# Patient Record
Sex: MALE | Race: WHITE | Employment: FULL TIME | URBAN - METROPOLITAN AREA
[De-identification: names, ages, dates, MRNs, and addresses within clinical notes are randomized per-mention and may not be internally consistent; named-entity substitution may affect disease eponyms.]

---

## 2017-10-13 LAB — COLONOSCOPY, EXTERNAL: NORMAL

## 2019-04-09 ENCOUNTER — OFFICE VISIT (OUTPATIENT)
Dept: FAMILY MEDICINE CLINIC | Age: 64
End: 2019-04-09

## 2019-04-09 VITALS
RESPIRATION RATE: 18 BRPM | OXYGEN SATURATION: 100 % | HEIGHT: 66 IN | TEMPERATURE: 98.4 F | DIASTOLIC BLOOD PRESSURE: 80 MMHG | WEIGHT: 203.2 LBS | SYSTOLIC BLOOD PRESSURE: 136 MMHG | HEART RATE: 77 BPM | BODY MASS INDEX: 32.66 KG/M2

## 2019-04-09 DIAGNOSIS — R73.03 PREDIABETES: Primary | ICD-10-CM

## 2019-04-09 DIAGNOSIS — I10 ESSENTIAL HYPERTENSION: ICD-10-CM

## 2019-04-09 PROBLEM — G25.81 RESTLESS LEGS SYNDROME (RLS): Status: ACTIVE | Noted: 2018-02-23

## 2019-04-09 PROBLEM — G47.33 SEVERE OBSTRUCTIVE SLEEP APNEA: Status: ACTIVE | Noted: 2018-02-23

## 2019-04-09 RX ORDER — METFORMIN HYDROCHLORIDE 500 MG/1
TABLET, EXTENDED RELEASE ORAL
Refills: 3 | COMMUNITY
Start: 2019-03-04 | End: 2019-04-09 | Stop reason: ALTCHOICE

## 2019-04-09 RX ORDER — METFORMIN HYDROCHLORIDE 500 MG/1
500 TABLET, EXTENDED RELEASE ORAL
Qty: 90 TAB | Refills: 3 | Status: SHIPPED | COMMUNITY
Start: 2019-04-09 | End: 2019-08-12 | Stop reason: SDUPTHER

## 2019-04-09 RX ORDER — LISINOPRIL 10 MG/1
10 TABLET ORAL DAILY
Qty: 90 TAB | Refills: 3 | Status: SHIPPED | COMMUNITY
Start: 2019-04-09 | End: 2019-05-07 | Stop reason: SDUPTHER

## 2019-04-09 RX ORDER — LISINOPRIL 10 MG/1
TABLET ORAL
COMMUNITY
End: 2019-04-09

## 2019-04-09 RX ORDER — METFORMIN HYDROCHLORIDE 500 MG/1
500 TABLET, EXTENDED RELEASE ORAL
Qty: 30 TAB | Refills: 3 | Status: SHIPPED | OUTPATIENT
Start: 2019-04-09 | End: 2019-04-09 | Stop reason: SDUPTHER

## 2019-04-09 RX ORDER — METFORMIN HYDROCHLORIDE 500 MG/1
500 TABLET, EXTENDED RELEASE ORAL
Qty: 90 TAB | Refills: 3 | Status: SHIPPED | COMMUNITY
Start: 2019-04-09 | End: 2019-04-09 | Stop reason: SDUPTHER

## 2019-04-09 NOTE — PATIENT INSTRUCTIONS
DASH Diet: Care Instructions  Your Care Instructions    The DASH diet is an eating plan that can help lower your blood pressure. DASH stands for Dietary Approaches to Stop Hypertension. Hypertension is high blood pressure. The DASH diet focuses on eating foods that are high in calcium, potassium, and magnesium. These nutrients can lower blood pressure. The foods that are highest in these nutrients are fruits, vegetables, low-fat dairy products, nuts, seeds, and legumes. But taking calcium, potassium, and magnesium supplements instead of eating foods that are high in those nutrients does not have the same effect. The DASH diet also includes whole grains, fish, and poultry. The DASH diet is one of several lifestyle changes your doctor may recommend to lower your high blood pressure. Your doctor may also want you to decrease the amount of sodium in your diet. Lowering sodium while following the DASH diet can lower blood pressure even further than just the DASH diet alone. Follow-up care is a key part of your treatment and safety. Be sure to make and go to all appointments, and call your doctor if you are having problems. It's also a good idea to know your test results and keep a list of the medicines you take. How can you care for yourself at home? Following the DASH diet  · Eat 4 to 5 servings of fruit each day. A serving is 1 medium-sized piece of fruit, ½ cup chopped or canned fruit, 1/4 cup dried fruit, or 4 ounces (½ cup) of fruit juice. Choose fruit more often than fruit juice. · Eat 4 to 5 servings of vegetables each day. A serving is 1 cup of lettuce or raw leafy vegetables, ½ cup of chopped or cooked vegetables, or 4 ounces (½ cup) of vegetable juice. Choose vegetables more often than vegetable juice. · Get 2 to 3 servings of low-fat and fat-free dairy each day. A serving is 8 ounces of milk, 1 cup of yogurt, or 1 ½ ounces of cheese. · Eat 6 to 8 servings of grains each day.  A serving is 1 slice of bread, 1 ounce of dry cereal, or ½ cup of cooked rice, pasta, or cooked cereal. Try to choose whole-grain products as much as possible. · Limit lean meat, poultry, and fish to 2 servings each day. A serving is 3 ounces, about the size of a deck of cards. · Eat 4 to 5 servings of nuts, seeds, and legumes (cooked dried beans, lentils, and split peas) each week. A serving is 1/3 cup of nuts, 2 tablespoons of seeds, or ½ cup of cooked beans or peas. · Limit fats and oils to 2 to 3 servings each day. A serving is 1 teaspoon of vegetable oil or 2 tablespoons of salad dressing. · Limit sweets and added sugars to 5 servings or less a week. A serving is 1 tablespoon jelly or jam, ½ cup sorbet, or 1 cup of lemonade. · Eat less than 2,300 milligrams (mg) of sodium a day. If you limit your sodium to 1,500 mg a day, you can lower your blood pressure even more. Tips for success  · Start small. Do not try to make dramatic changes to your diet all at once. You might feel that you are missing out on your favorite foods and then be more likely to not follow the plan. Make small changes, and stick with them. Once those changes become habit, add a few more changes. · Try some of the following:  ? Make it a goal to eat a fruit or vegetable at every meal and at snacks. This will make it easy to get the recommended amount of fruits and vegetables each day. ? Try yogurt topped with fruit and nuts for a snack or healthy dessert. ? Add lettuce, tomato, cucumber, and onion to sandwiches. ? Combine a ready-made pizza crust with low-fat mozzarella cheese and lots of vegetable toppings. Try using tomatoes, squash, spinach, broccoli, carrots, cauliflower, and onions. ? Have a variety of cut-up vegetables with a low-fat dip as an appetizer instead of chips and dip. ? Sprinkle sunflower seeds or chopped almonds over salads. Or try adding chopped walnuts or almonds to cooked vegetables.   ? Try some vegetarian meals using beans and peas. Add garbanzo or kidney beans to salads. Make burritos and tacos with mashed nassar beans or black beans. Where can you learn more? Go to http://gloria-candida.info/. Enter K075 in the search box to learn more about \"DASH Diet: Care Instructions. \"  Current as of: July 22, 2018  Content Version: 11.9  © 5062-2770 [employer], [employer]. Care instructions adapted under license by [employer] (which disclaims liability or warranty for this information). If you have questions about a medical condition or this instruction, always ask your healthcare professional. [address] any warranty or liability for your use of this information.

## 2019-04-09 NOTE — PROGRESS NOTES
HISTORY OF PRESENT ILLNESS  Lore Yang is a 58 y.o. male. HPI  Patient is here today for evaluation and treatment of: Diabetes/Hypertension/      Prediabetes: Pt has had  prediabetes X3 years. ;  Last A1c was controlled. He attempts a lower sugar diet and stays active. He is on metformin and tolerates med well. He needs refills on his meds. He would like to use his mail order pharmacy but needs a short term rx sent to a local pharmacy. Hypertension:    Pt states that his BP has been mostly controlled. Pt is on lisinopril. Pt takes med daily    He has no new complaints. Current Outpatient Medications:     vit B complex 100 no.2/herbs (VITAMIN B COMPLEX 100  2-HERBS PO), vitamin B complex  one daily, Disp: , Rfl:     lisinopril (PRINIVIL, ZESTRIL) 10 mg tablet, lisinopril 10 mg tablet  TAKE 1 TABLET BY MOUTH EVERY DAY, Disp: , Rfl:     metFORMIN ER (GLUCOPHAGE XR) 500 mg tablet, TK 1 T PO QD, Disp: , Rfl: 3    PMH,  Meds, Allergies, Family History, Social history reviewed    Review of Systems   Constitutional: Negative for chills and fever. Respiratory: Negative for shortness of breath and wheezing. Cardiovascular: Negative for chest pain and palpitations. Physical Exam   Visit Vitals  /80   Pulse 77   Temp 98.4 °F (36.9 °C) (Oral)   Resp 18   Ht 5' 6\" (1.676 m)   Wt 203 lb 3.2 oz (92.2 kg)   SpO2 100%   BMI 32.80 kg/m²         ASSESSMENT and PLAN    ICD-10-CM ICD-9-CM    1. Prediabetes R73.03 790.29    2.  Essential hypertension I10 401.9        As above,   above all stable unless otherwise noted   treatment plan as listed below  Orders Placed This Encounter    DISCONTD: canagliflozin-metformin 150-500 mg TBph    vit B complex 100 no.2/herbs (VITAMIN B COMPLEX 100  2-HERBS PO)    DISCONTD: lisinopril (PRINIVIL, ZESTRIL) 10 mg tablet    DISCONTD: metFORMIN ER (GLUCOPHAGE XR) 500 mg tablet    lisinopril (PRINIVIL, ZESTRIL) 10 mg tablet    DISCONTD: metFORMIN ER (GLUCOPHAGE XR) 500 mg tablet    DISCONTD: metFORMIN ER (GLUCOPHAGE XR) 500 mg tablet    metFORMIN ER (GLUCOPHAGE XR) 500 mg tablet     meds reconciled  Refilled meds needed  Follow-up and Dispositions    · Return in about 4 months (around 8/9/2019) for prediabetes/htn. An After Visit Summary was printed and given to the patient. This has been fully explained to the patient, who indicates understanding.

## 2019-04-09 NOTE — PROGRESS NOTES
Patient here for DM, HTN        1. Have you been to the ER, urgent care clinic since your last visit? Hospitalized since your last visit? No    2. Have you seen or consulted any other health care providers outside of the [address] [place] [address] since your last visit? Include any pap smears or colon screening.  No

## 2019-05-07 ENCOUNTER — TELEPHONE (OUTPATIENT)
Dept: FAMILY MEDICINE CLINIC | Age: 64
End: 2019-05-07

## 2019-05-07 RX ORDER — LISINOPRIL 10 MG/1
10 TABLET ORAL DAILY
Qty: 30 TAB | Refills: 3 | Status: SHIPPED | OUTPATIENT
Start: 2019-05-07 | End: 2019-06-04 | Stop reason: SDUPTHER

## 2019-05-07 NOTE — TELEPHONE ENCOUNTER
Patient called and said that the pharmacy filled his lisinopril as the branded medication instead of generic. His insurance denied the meds and he needs a new script sent in or a call back regarding meds.

## 2019-05-07 NOTE — TELEPHONE ENCOUNTER
Spoke with Cayla at [facility] stated that the system pulled Prinivil to be processed. Cayla manually entered lisinopril accordingly to the prescription order. Cayla stated that refill can still not be processed due to the patient has no benefits with [facility] and patient needs to contact the insurance company. Will inform patient. Cayla verbalized understanding.

## 2019-05-07 NOTE — TELEPHONE ENCOUNTER
Spoke with the patient to inform that Cayla at [facility] stated that the system pulled Prinivil to be processed. Informed that Cayla manually entered lisinopril accordingly to the prescription order. Informed that Cayla stated that refill can still not be processed due to the patient has no benefits with [facility] and inform patient to contact the insurance company. Patient verbalized understanding and requested a refill for lisinopril be sent to the [facility] [address].

## 2019-06-06 RX ORDER — LISINOPRIL 10 MG/1
TABLET ORAL
Qty: 90 TAB | Refills: 3 | Status: SHIPPED | OUTPATIENT
Start: 2019-06-06 | End: 2019-08-12 | Stop reason: SDUPTHER

## 2019-06-06 RX ORDER — METFORMIN HYDROCHLORIDE 500 MG/1
TABLET, EXTENDED RELEASE ORAL
Qty: 90 TAB | Refills: 3 | Status: SHIPPED | OUTPATIENT
Start: 2019-06-06 | End: 2019-12-09 | Stop reason: SDUPTHER

## 2019-08-08 ENCOUNTER — OFFICE VISIT (OUTPATIENT)
Dept: FAMILY MEDICINE CLINIC | Age: 64
End: 2019-08-08

## 2019-08-08 VITALS
HEIGHT: 66 IN | WEIGHT: 208.8 LBS | OXYGEN SATURATION: 96 % | HEART RATE: 84 BPM | BODY MASS INDEX: 33.56 KG/M2 | TEMPERATURE: 98.2 F | RESPIRATION RATE: 18 BRPM | DIASTOLIC BLOOD PRESSURE: 73 MMHG | SYSTOLIC BLOOD PRESSURE: 131 MMHG

## 2019-08-08 DIAGNOSIS — R73.09 ELEVATED HEMOGLOBIN A1C: Primary | ICD-10-CM

## 2019-08-08 DIAGNOSIS — I10 ESSENTIAL HYPERTENSION: ICD-10-CM

## 2019-08-08 DIAGNOSIS — K21.9 GASTROESOPHAGEAL REFLUX DISEASE WITHOUT ESOPHAGITIS: ICD-10-CM

## 2019-08-08 RX ORDER — OMEPRAZOLE 20 MG/1
TABLET, DELAYED RELEASE ORAL
COMMUNITY
End: 2019-08-08 | Stop reason: SDUPTHER

## 2019-08-08 RX ORDER — PHENOL/SODIUM PHENOLATE
20 AEROSOL, SPRAY (ML) MUCOUS MEMBRANE DAILY
Qty: 30 TAB | Refills: 6 | Status: SHIPPED | OUTPATIENT
Start: 2019-08-08

## 2019-08-08 RX ORDER — PHENOL/SODIUM PHENOLATE
20 AEROSOL, SPRAY (ML) MUCOUS MEMBRANE DAILY
COMMUNITY
End: 2019-08-08 | Stop reason: SDUPTHER

## 2019-08-08 RX ORDER — MINERAL OIL
180 ENEMA (ML) RECTAL DAILY
COMMUNITY

## 2019-08-08 NOTE — PROGRESS NOTES
HISTORY OF PRESENT ILLNESS  James Mosley is a 71 y.o. male. HPI  Patient is here today for evaluation and treatment of: Pre- Diabetes/Hypertension/    Pre-Diabetes:    Hew is on metformin for prediabetes ; He is in need of bloodwork. He will be using [facility] soon. Hypertension:  BP is stable; He continues on lisinopril      GERD- takes OTC prilosec which helps his GERD. He requests a refill to [facility] for this. Pt takes boarding pass for prostate health. This helps his urinary flow. Current Outpatient Medications:     omeprazole (PRILOSEC OTC) 20 mg tablet, omeprazole 20 mg tablet,delayed release  Take 1 tablet every day by oral route., Disp: , Rfl:     Fe gluconate/vit C/folic acid (IRON-C PO), Take  by mouth., Disp: , Rfl:     SAW PALMETTO PO, Take 450 mg by mouth., Disp: , Rfl:     fexofenadine (ALLEGRA) 180 mg tablet, Take  by mouth., Disp: , Rfl:     metFORMIN ER (GLUCOPHAGE XR) 500 mg tablet, TAKE 1 TABLET BY MOUTH DAILY WITH DINNER, Disp: 90 Tab, Rfl: 3    lisinopril (PRINIVIL, ZESTRIL) 10 mg tablet, TAKE 1 TABLET BY MOUTH DAILY, Disp: 90 Tab, Rfl: 3    metFORMIN ER (GLUCOPHAGE XR) 500 mg tablet, Take 1 Tab by mouth daily (with dinner). , Disp: 90 Tab, Rfl: 3    vit B complex 100 no.2/herbs (VITAMIN B COMPLEX 100  2-HERBS PO), vitamin B complex  one daily, Disp: , Rfl:      PMH,  Meds, Allergies, Family History, Social history reviewed    Review of Systems   Constitutional: Negative for chills and fever. Respiratory: Negative for shortness of breath and wheezing. Cardiovascular: Negative for chest pain and palpitations. Gastrointestinal: Positive for heartburn (if he does not take prilosec).        Physical Exam   Visit Vitals  /73 (BP 1 Location: Left arm, BP Patient Position: Sitting)   Pulse 84   Temp 98.2 °F (36.8 °C) (Oral)   Resp 18   Ht 5' 6\" (1.676 m)   Wt 208 lb 12.8 oz (94.7 kg)   SpO2 96%   BMI 33.70 kg/m²     General appearance: alert, cooperative, no distress, appears stated age  Neck: supple, symmetrical, trachea midline, no adenopathy, thyroid: not enlarged, symmetric, no tenderness/mass/nodules, no carotid bruit and no JVD  Lungs: clear to auscultation bilaterally  Heart: regular rate and rhythm, S1, S2 normal, no murmur, click, rub or gallop  Extremities: extremities normal, atraumatic, no cyanosis or edema      ASSESSMENT and PLAN    ICD-10-CM ICD-9-CM    1. Elevated hemoglobin A1c R73.09 790.29 HEMOGLOBIN A1C WITH EAG      AST      ALT   2. Essential hypertension I20 705.6 METABOLIC PANEL, BASIC      LIPID PANEL   3. Gastroesophageal reflux disease without esophagitis K21.9 530.81        As above,   above all stable unless otherwise noted   treatment plan as listed below  Orders Placed This Encounter    METABOLIC PANEL, BASIC    HEMOGLOBIN A1C WITH EAG    LIPID PANEL    AST    ALT    DISCONTD: omeprazole (PRILOSEC OTC) 20 mg tablet    Fe gluconate/vit C/folic acid (IRON-C PO)    SAW PALMETTO PO    fexofenadine (ALLEGRA) 180 mg tablet    DISCONTD: Omeprazole delayed release (PRILOSEC D/R) 20 mg tablet    saw palmetto xtr/zinc picolin (SAW PALMETTO EXTRACT PO)    Omeprazole delayed release (PRILOSEC D/R) 20 mg tablet     Ordered prilosec as per record  Continue other current meds  Will attempt to refill Pts med to [id number] [facility]  Follow-up and Dispositions    · Return in about 4 months (around 12/8/2019) for medicare well. An After Visit Summary was printed and given to the patient. This has been fully explained to the patient, who indicates understanding.

## 2019-08-08 NOTE — PROGRESS NOTES
Patient is here for a hypertension/pre diabetes Follow up. 1. Have you been to the ER, urgent care clinic since your last visit? Hospitalized since your last visit? No    2. Have you seen or consulted any other health care providers outside of the [address] [place] [address] since your last visit? Include any pap smears or colon screening.  No

## 2019-08-09 LAB
ALT SERPL-CCNC: 19 IU/L (ref 0–44)
AST SERPL-CCNC: 23 IU/L (ref 0–40)
BUN SERPL-MCNC: 19 MG/DL (ref 8–27)
BUN/CREAT SERPL: 16 (ref 10–24)
CALCIUM SERPL-MCNC: 9.6 MG/DL (ref 8.6–10.2)
CHLORIDE SERPL-SCNC: 102 MMOL/L (ref 96–106)
CHOLEST SERPL-MCNC: 218 MG/DL (ref 100–199)
CO2 SERPL-SCNC: 22 MMOL/L (ref 20–29)
CREAT SERPL-MCNC: 1.2 MG/DL (ref 0.76–1.27)
EST. AVERAGE GLUCOSE BLD GHB EST-MCNC: 128 MG/DL
GLUCOSE SERPL-MCNC: 112 MG/DL (ref 65–99)
HBA1C MFR BLD: 6.1 % (ref 4.8–5.6)
HDLC SERPL-MCNC: 61 MG/DL
LDLC SERPL CALC-MCNC: 139 MG/DL (ref 0–99)
POTASSIUM SERPL-SCNC: 5.7 MMOL/L (ref 3.5–5.2)
SODIUM SERPL-SCNC: 138 MMOL/L (ref 134–144)
TRIGL SERPL-MCNC: 90 MG/DL (ref 0–149)
VLDLC SERPL CALC-MCNC: 18 MG/DL (ref 5–40)

## 2019-08-12 RX ORDER — METFORMIN HYDROCHLORIDE 500 MG/1
500 TABLET, EXTENDED RELEASE ORAL
Qty: 90 TAB | Refills: 3 | Status: SHIPPED | OUTPATIENT
Start: 2019-08-12 | End: 2019-12-09 | Stop reason: SDUPTHER

## 2019-08-12 RX ORDER — LISINOPRIL 10 MG/1
TABLET ORAL
Qty: 90 TAB | Refills: 3 | Status: SHIPPED | COMMUNITY
Start: 2019-08-12 | End: 2019-12-09 | Stop reason: SDUPTHER

## 2019-12-09 ENCOUNTER — OFFICE VISIT (OUTPATIENT)
Dept: FAMILY MEDICINE CLINIC | Age: 64
End: 2019-12-09

## 2019-12-09 VITALS
TEMPERATURE: 97.9 F | SYSTOLIC BLOOD PRESSURE: 136 MMHG | DIASTOLIC BLOOD PRESSURE: 69 MMHG | BODY MASS INDEX: 33.91 KG/M2 | RESPIRATION RATE: 16 BRPM | WEIGHT: 211 LBS | HEIGHT: 66 IN | HEART RATE: 81 BPM | OXYGEN SATURATION: 100 %

## 2019-12-09 DIAGNOSIS — R73.09 ELEVATED HEMOGLOBIN A1C: ICD-10-CM

## 2019-12-09 DIAGNOSIS — Z00.00 WELCOME TO MEDICARE PREVENTIVE VISIT: Primary | ICD-10-CM

## 2019-12-09 DIAGNOSIS — Z11.59 ENCOUNTER FOR HEPATITIS C VIRUS SCREENING TEST FOR HIGH RISK PATIENT: ICD-10-CM

## 2019-12-09 DIAGNOSIS — I10 ESSENTIAL HYPERTENSION: ICD-10-CM

## 2019-12-09 DIAGNOSIS — Z23 ENCOUNTER FOR IMMUNIZATION: ICD-10-CM

## 2019-12-09 DIAGNOSIS — Z91.89 ENCOUNTER FOR HEPATITIS C VIRUS SCREENING TEST FOR HIGH RISK PATIENT: ICD-10-CM

## 2019-12-09 RX ORDER — LISINOPRIL 10 MG/1
TABLET ORAL
Qty: 90 TAB | Refills: 3 | Status: SHIPPED | OUTPATIENT
Start: 2019-12-09 | End: 2020-10-14

## 2019-12-09 RX ORDER — METFORMIN HYDROCHLORIDE 500 MG/1
500 TABLET, EXTENDED RELEASE ORAL
Qty: 90 TAB | Refills: 3 | Status: SHIPPED | OUTPATIENT
Start: 2019-12-09 | End: 2020-06-20 | Stop reason: ALTCHOICE

## 2019-12-09 NOTE — PATIENT INSTRUCTIONS
Medicare Wellness Visit, Male The best way to live healthy is to have a lifestyle where you eat a well-balanced diet, exercise regularly, limit alcohol use, and quit all forms of tobacco/nicotine, if applicable. Regular preventive services are another way to keep healthy. Preventive services (vaccines, screening tests, monitoring & exams) can help personalize your care plan, which helps you manage your own care. Screening tests can find health problems at the earliest stages, when they are easiest to treat. [facility] follows the current, evidence-based guidelines published by the [facility] ([facility]) when recommending preventive services for our patients. Because we follow these guidelines, sometimes recommendations change over time as research supports it. (For example, a prostate screening blood test is no longer routinely recommended for men with no symptoms). Of course, you and your doctor may decide to screen more often for some diseases, based on your risk and co-morbidities (chronic disease you are already diagnosed with). Preventive services for you include: - Medicare offers their members a free annual wellness visit, which is time for you and your primary care provider to discuss and plan for your preventive service needs. Take advantage of this benefit every year! 
-All adults over age 72 should receive the recommended pneumonia vaccines. Current USPSTF guidelines recommend a series of two vaccines for the best pneumonia protection.  
-All adults should have a flu vaccine yearly and tetanus vaccine every 10 years. 
-All adults age 48 and older should receive the shingles vaccines (series of two vaccines).       
-All adults age 38-68 who are overweight should have a diabetes screening test once every three years.  
-Other screening tests & preventive services for persons with diabetes include: an eye exam to screen for diabetic retinopathy, a kidney function test, a foot exam, and stricter control over your cholesterol.  
-Cardiovascular screening for adults with routine risk involves an electrocardiogram (ECG) at intervals determined by the provider.  
-Colorectal cancer screening should be done for adults age 54-65 with no increased risk factors for colorectal cancer. There are a number of acceptable methods of screening for this type of cancer. Each test has its own benefits and drawbacks. Discuss with your provider what is most appropriate for you during your annual wellness visit. The different tests include: colonoscopy (considered the best screening method), a fecal occult blood test, a fecal DNA test, and sigmoidoscopy. 
-All adults born between [facility] should be screened once for Hepatitis C. 
-An Abdominal Aortic Aneurysm (AAA) Screening is recommended for men age 73-68 who has ever smoked in their lifetime. Here is a list of your current Health Maintenance items (your personalized list of preventive services) with a due date: 
Health Maintenance Due Topic Date Due  
 Hepatitis C Test  1955  Colonoscopy  04/23/1972  Pneumococcal Vaccine (1 of 1 - PPSV23) 04/23/2019  Flu Vaccine  08/01/2019

## 2019-12-09 NOTE — PROGRESS NOTES
This is a \"Welcome to [employer]"  Initial Preventive Physical Examination (IPPE) providing Personalized Prevention Plan Services (Performed in the first 12 months of enrollment)    I have reviewed the patient's medical history in detail and updated the computerized patient record. Doing well. Walking daily. History     Past Medical History:   Diagnosis Date    Allergic rhinitis     GERD (gastroesophageal reflux disease)     GERD (gastroesophageal reflux disease)     Hypertension     Insomnia 02/23/2018    External report    Nocturia     Prediabetes     Restless legs syndrome (RLS) 02/23/2018    External report    Severe obstructive sleep apnea 02/23/2018    External report      Past Surgical History:   Procedure Laterality Date    ABDOMEN SURGERY PROC UNLISTED      HX APPENDECTOMY      HX HERNIA REPAIR      HX TONSILLECTOMY       Current Outpatient Medications   Medication Sig Dispense Refill    lisinopril (PRINIVIL, ZESTRIL) 10 mg tablet TAKE 1 TABLET BY MOUTH DAILY 30 Tab 6    metFORMIN ER (GLUCOPHAGE XR) 500 mg tablet Take 1 Tab by mouth daily (with dinner). 90 Tab 3    Fe gluconate/vit C/folic acid (IRON-C PO) Take  by mouth.  SAW PALMETTO PO Take 450 mg by mouth.  fexofenadine (ALLEGRA) 180 mg tablet Take 180 mg by mouth daily.  Omeprazole delayed release (PRILOSEC D/R) 20 mg tablet Take 1 Tab by mouth daily. 30 Tab 6    vit B complex 100 no.2/herbs (VITAMIN B COMPLEX 100  2-HERBS PO) vitamin B complex   one daily       Allergies   Allergen Reactions    Nifedipine Other (comments)     Facial redness    Other Plant, Animal, Environmental Sneezing     Watery eyes, runny nose, sneezing.     Sulfa (Sulfonamide Antibiotics) Other (comments)     Unknown reaction       Family History   Problem Relation Age of Onset    Alzheimer Mother     COPD Father     Lung Disease Father      Social History     Tobacco Use    Smoking status: Former Smoker     Packs/day: 1.00 Years: 8.00     Pack years: 8.00     Last attempt to quit: 1980     Years since quittin.9    Smokeless tobacco: Never Used   Substance Use Topics    Alcohol use: Yes     Alcohol/week: 6.0 standard drinks     Types: 5 Glasses of wine, 1 Shots of liquor per week       Depression Risk Screen     3 most recent PHQ Screens 2019   Little interest or pleasure in doing things Not at all   Feeling down, depressed, irritable, or hopeless Not at all   Total Score PHQ 2 0       Alcohol Risk Factor Screening (MALE > 65): Do you average more 1 drink per night or more than 7 drinks a week: No    In the past three months have you have had more than 4 drinks containing alcohol on one occasion: No      Functional Ability and Level of Safety   Diet: No special diet    Hearing: Hearing is good. Vision Screening:  Vision is good. Visual Acuity Screening    Right eye Left eye Both eyes   Without correction: 20/20 20/20 20/20   With correction:            Activities of Daily Living: The home contains: no safety equipment. Patient does total self care    Ambulation: with no difficulty    Exercise level: moderately active    Fall Risk Screen:  Fall Risk Assessment, last 12 mths 2019   Able to walk? Yes   Fall in past 12 months?  No       Abuse Screen:  Patient is not abused    Screening EKG   EKG order placed: Yes    Patient Care Team   Patient Care Team:  Sandeep Martínez MD as PCP - General (Family Practice)  Sandeep Martínez MD as PCP - [facility] Empaneled Provider     PE:  Visit Vitals  /69 (BP 1 Location: Right arm, BP Patient Position: Sitting)   Pulse 81   Temp 97.9 °F (36.6 °C) (Oral)   Resp 16   Ht 5' 6\" (1.676 m)   Wt 211 lb (95.7 kg)   SpO2 100%   BMI 34.06 kg/m²     General appearance: alert, cooperative, no distress, appears stated age    Lungs: clear to auscultation bilaterally  Heart: regular rate and rhythm, S1, S2 normal, no murmur, click, rub or gallop    Extremities: extremities normal, atraumatic, no cyanosis or edema      End of Life Planning   Advanced care planning directives were discussed with the patient and /or family/caregiver. Assessment/Plan   Education and counseling provided:  Are appropriate based on today's review and evaluation  End-of-Life planning (with patient's consent)  Colorectal cancer screening tests  Cardiovascular screening blood test    Diagnoses and all orders for this visit:    1. Welcome to Medicare preventive visit  -     AMB POC EKG ROUTINE W/ 12 LEADS, SCREEN ()    2. Elevated hemoglobin A1c  -     HEMOGLOBIN A1C WITH EAG; Future  -     AST; Future  -     ALT; Future    3. Essential hypertension  -     LIPID PANEL; Future    4. Encounter for hepatitis C virus screening test for high risk patient  -     HEPATITIS C AB; Future    5. Encounter for immunization  -     PNEUMOCOCCAL POLYSACCHARIDE VACCINE, 23-VALENT, ADULT OR IMMUNOSUPPRESSED PT DOSE,    Other orders  -     lisinopril (PRINIVIL, ZESTRIL) 10 mg tablet; TAKE 1 TABLET BY MOUTH DAILY  -     metFORMIN ER (GLUCOPHAGE XR) 500 mg tablet; Take 1 Tab by mouth daily (with dinner). -     LIPID PANEL  -     HEMOGLOBIN A1C WITH EAG  -     HEPATITIS C AB  -     AST  -     ALT        Health Maintenance Due   Topic Date Due    COLONOSCOPY  04/23/1972    Influenza Age 5 to Adult  08/01/2019     As above  Pt stable  Follow-up and Dispositions    · Return in about 6 months (around 6/9/2020) for prediabetes. An After Visit Summary was printed and given to the patient. This has been fully explained to the patient, who indicates understanding.

## 2019-12-09 NOTE — PROGRESS NOTES
1. Have you been to the ER, urgent care clinic since your last visit? Hospitalized since your last visit? No    2. Have you seen or consulted any other health care providers outside of the [address] [place] [address] since your last visit? Include any pap smears or colon screening.  No

## 2019-12-10 LAB
ALT SERPL-CCNC: 18 IU/L (ref 0–44)
AST SERPL-CCNC: 20 IU/L (ref 0–40)
CHOLEST SERPL-MCNC: 209 MG/DL (ref 100–199)
EST. AVERAGE GLUCOSE BLD GHB EST-MCNC: 131 MG/DL
HBA1C MFR BLD: 6.2 % (ref 4.8–5.6)
HCV AB S/CO SERPL IA: <0.1 S/CO RATIO (ref 0–0.9)
HDLC SERPL-MCNC: 58 MG/DL
LDLC SERPL CALC-MCNC: 130 MG/DL (ref 0–99)
TRIGL SERPL-MCNC: 104 MG/DL (ref 0–149)
VLDLC SERPL CALC-MCNC: 21 MG/DL (ref 5–40)

## 2019-12-17 ENCOUNTER — TELEPHONE (OUTPATIENT)
Dept: FAMILY MEDICINE CLINIC | Age: 64
End: 2019-12-17

## 2020-06-15 ENCOUNTER — VIRTUAL VISIT (OUTPATIENT)
Dept: FAMILY MEDICINE CLINIC | Age: 65
End: 2020-06-15

## 2020-06-15 DIAGNOSIS — R73.09 ELEVATED HEMOGLOBIN A1C: Primary | ICD-10-CM

## 2020-06-15 DIAGNOSIS — K21.9 GASTROESOPHAGEAL REFLUX DISEASE WITHOUT ESOPHAGITIS: ICD-10-CM

## 2020-06-15 DIAGNOSIS — I10 ESSENTIAL HYPERTENSION: ICD-10-CM

## 2020-06-15 NOTE — PROGRESS NOTES
Jose Ramon Oshea is a 76 y.o. male who was seen by synchronous (real-time) audio-video technology on 6/15/2020. Consent: Jose Ramon Oshea, who was seen by synchronous (real-time) audio-video technology, and/or his healthcare decision maker, is aware that this patient-initiated, Telehealth encounter on 6/15/2020 is a billable service, with coverage as determined by his insurance carrier. He is aware that he may receive a bill and has provided verbal consent to proceed: Yes. Assessment & Plan:   Diagnoses and all orders for this visit:    1. Elevated hemoglobin A1c  -     HEMOGLOBIN A1C WITH EAG; Future    2. Essential hypertension  -     LIPID PANEL; Future  -     METABOLIC PANEL, BASIC; Future    3. Gastroesophageal reflux disease without esophagitis  -     REFERRAL TO GASTROENTEROLOGY; Future        As above,   above all stable unless otherwise noted   treatment plan as listed below  Pt prefers to wait to get labs until he has an in office visit  Follow-up and Dispositions    · Return in about 4 months (around 10/15/2020) for htn, Chol, diabetes. DC metformin XR  Metformin IR at 500 mg daily. ( will send [employer] message of change)  This has been fully explained to the patient, who indicates understanding. avs is available via [employer]. 712  Subjective:   Jose Ramon Oshea is a 76 y.o. male who was seen for Hypertension and Diabetes      HTN: he has been working at night at [employer] He has been more active because of this. He has no new physical complaints. BP Readings from Last 3 Encounters:   12/09/19 136/69   08/08/19 131/73   04/09/19 136/80     Pt is on metformin 500 xr ; This needs to be changed ; He is being treated for prediabetes. Pt also has  GERD; He was advised to have follow up endoscopy and is in need of referral to GI.  GERD sx are controlled as long as he continues on prilosec. Prior to Admission medications    Medication Sig Start Date End Date Taking?  Authorizing Provider lisinopril (PRINIVIL, ZESTRIL) 10 mg tablet TAKE 1 TABLET BY MOUTH DAILY 12/9/19  Yes Дмитрий Prater MD   metFORMIN ER (GLUCOPHAGE XR) 500 mg tablet Take 1 Tab by mouth daily (with dinner). 12/9/19  Yes Дмитрий Prater MD   Fe gluconate/vit C/folic acid (IRON-C PO) Take  by mouth. Yes Provider, Historical   SAW PALMETTO PO Take 450 mg by mouth. Yes Provider, Historical   fexofenadine (ALLEGRA) 180 mg tablet Take 180 mg by mouth daily. Yes Provider, Historical   Omeprazole delayed release (PRILOSEC D/R) 20 mg tablet Take 1 Tab by mouth daily. 8/8/19  Yes Дмитрий Prater MD     Allergies   Allergen Reactions    Nifedipine Other (comments)     Facial redness    Other Plant, Animal, Environmental Sneezing     Watery eyes, runny nose, sneezing.  Sulfa (Sulfonamide Antibiotics) Other (comments)     Unknown reaction       Patient Active Problem List    Diagnosis Date Noted    Prediabetes     Diabetes ([facility] [address]     Hypertension     GERD (gastroesophageal reflux disease)     Severe obstructive sleep apnea 02/23/2018    Restless legs syndrome (RLS) 02/23/2018     Allergies   Allergen Reactions    Nifedipine Other (comments)     Facial redness    Other Plant, Animal, Environmental Sneezing     Watery eyes, runny nose, sneezing.     Sulfa (Sulfonamide Antibiotics) Other (comments)     Unknown reaction     Past Medical History:   Diagnosis Date    Allergic rhinitis     GERD (gastroesophageal reflux disease)     GERD (gastroesophageal reflux disease)     Hypertension     Insomnia 02/23/2018    External report    Nocturia     Prediabetes     Restless legs syndrome (RLS) 02/23/2018    External report    Severe obstructive sleep apnea 02/23/2018    External report     Past Surgical History:   Procedure Laterality Date    ABDOMEN SURGERY PROC UNLISTED      HX APPENDECTOMY      HX HERNIA REPAIR      HX TONSILLECTOMY       Family History   Problem Relation Age of Onset    Alzheimer Mother    Jarvis Self COPD Father     Lung Disease Father      Social History     Tobacco Use    Smoking status: Former Smoker     Packs/day: 1.00     Years: 8.00     Pack years: 8.00     Last attempt to quit: 1980     Years since quittin.4    Smokeless tobacco: Never Used   Substance Use Topics    Alcohol use: Yes     Alcohol/week: 6.0 standard drinks     Types: 5 Glasses of wine, 1 Shots of liquor per week       Review of Systems   Constitutional: Negative for chills and fever. Cardiovascular: Negative for chest pain and palpitations. Objective: There were no vitals taken for this visit.    General: alert, cooperative, no distress   Mental  status: normal mood, behavior, speech, dress, motor activity, and thought processes, able to follow commands   HENT: NCAT   Neck: no visualized mass   Resp: no respiratory distress   Neuro: no gross deficits   Skin: no discoloration or lesions of concern on visible areas   Psychiatric: normal affect, consistent with stated mood, no evidence of hallucinations     Additional exam findings: none    Lab Results   Component Value Date/Time    Hemoglobin A1c 6.2 (H) 2019 10:23 AM     Lab Results   Component Value Date/Time    Sodium 138 2019 09:07 AM    Potassium 5.7 (H) 2019 09:07 AM    Chloride 102 2019 09:07 AM    CO2 22 2019 09:07 AM    Glucose 112 (H) 2019 09:07 AM    BUN 19 2019 09:07 AM    Creatinine 1.20 2019 09:07 AM    BUN/Creatinine ratio 16 2019 09:07 AM    GFR est AA 73 2019 09:07 AM    GFR est non-AA 63 2019 09:07 AM    Calcium 9.6 2019 09:07 AM     Lab Results   Component Value Date/Time    Cholesterol, total 209 (H) 2019 10:23 AM    HDL Cholesterol 58 2019 10:23 AM    LDL, calculated 130 (H) 2019 10:23 AM    VLDL, calculated 21 2019 10:23 AM    Triglyceride 104 2019 10:23 AM         We discussed the expected course, resolution and complications of the diagnosis(es) in detail. Medication risks, benefits, costs, interactions, and alternatives were discussed as indicated. I advised him to contact the office if his condition worsens, changes or fails to improve as anticipated. He expressed understanding with the diagnosis(es) and plan. Cordelia Momin is a 76 y.o. male who was evaluated by a video visit encounter for concerns as above. Patient identification was verified prior to start of the visit. A caregiver was present when appropriate. Due to this being a TeleHealth encounter (During [facility] public health emergency), evaluation of the following organ systems was limited: Vitals/Constitutional/EENT/Resp/CV/GI//MS/Neuro/Skin/Heme-Lymph-Imm. Pursuant to the emergency declaration under the [facility] waiver authority and the [employer] and [employer] Act, this Virtual  Visit was conducted, with patient's (and/or legal guardian's) consent, to reduce the patient's risk of exposure to COVID-19 and provide necessary medical care. Services were provided through a video synchronous discussion virtually to substitute for in-person clinic visit. Patient was at home and provider was at the office.       Igor Allen MD

## 2020-06-15 NOTE — PATIENT INSTRUCTIONS

## 2020-06-20 RX ORDER — METFORMIN HYDROCHLORIDE 500 MG/1
500 TABLET ORAL
Qty: 90 TAB | Refills: 3 | Status: SHIPPED | OUTPATIENT
Start: 2020-06-20 | End: 2021-08-06

## 2020-09-09 ENCOUNTER — TELEPHONE (OUTPATIENT)
Dept: PHARMACY | Age: 65
End: 2020-09-09

## 2020-09-09 NOTE — TELEPHONE ENCOUNTER
Lady Roc MD - would patient benefit from statin therapy? Based on history of DM and ASCVD risk of 30%, the patient may benefit from a high-intensity statin. If statin therapy is appropriate, rosuvastatin 20mg daily or atorvastatin 40mg daily would be good options. Please advise. I can follow up with patient if appropriate. Thank you,  Osorio Ellis, PharmD  PGY1 Resident  [facility]  [phone] [address]    ==============================================================  CLINICAL PHARMACY: STATIN REVIEW    SUBJECTIVE:   Identified as DM care gap for [employer]: statin therapy. Patient is currently adherent to DM and HTN medications. OBJECTIVE:  Allergies   Allergen Reactions    Nifedipine Other (comments)     Facial redness    Other Plant, Animal, Environmental Sneezing     Watery eyes, runny nose, sneezing.  Sulfa (Sulfonamide Antibiotics) Other (comments)     Unknown reaction       Medications per current medication list:  Current Outpatient Medications   Medication Sig Dispense Refill    metFORMIN (GLUCOPHAGE) 500 mg tablet Take 1 Tab by mouth daily (with breakfast). 90 Tab 3    lisinopril (PRINIVIL, ZESTRIL) 10 mg tablet TAKE 1 TABLET BY MOUTH DAILY 90 Tab 3    Fe gluconate/vit C/folic acid (IRON-C PO) Take  by mouth.  SAW PALMETTO PO Take 450 mg by mouth.  fexofenadine (ALLEGRA) 180 mg tablet Take 180 mg by mouth daily.  Omeprazole delayed release (PRILOSEC D/R) 20 mg tablet Take 1 Tab by mouth daily.  30 Tab 6       Labs:  Lab Results   Component Value Date/Time    Cholesterol, total 209 (H) 12/09/2019 10:23 AM    HDL Cholesterol 58 12/09/2019 10:23 AM    LDL, calculated 130 (H) 12/09/2019 10:23 AM    VLDL, calculated 21 12/09/2019 10:23 AM    Triglyceride 104 12/09/2019 10:23 AM     ALT (SGPT)   Date Value Ref Range Status   12/09/2019 18 0 - 44 IU/L Final     AST (SGOT)   Date Value Ref Range Status   12/09/2019 20 0 - 40 IU/L Final       ASCVD risk:  WHITE OR     BP Readings from Last 1 Encounters:   19 136/69       Social History     Tobacco Use    Smoking status: Former Smoker     Packs/day: 1.00     Years: 8.00     Pack years: 8.00     Last attempt to quit: 1980     Years since quittin.7    Smokeless tobacco: Never Used   Substance Use Topics    Alcohol use: Yes     Alcohol/week: 6.0 standard drinks     Types: 5 Glasses of wine, 1 Shots of liquor per week         ASSESSMENT:  Hyperlipidemia Goal: Patient has a 10-yr ASCVD risk of 29.7% with DM and is therefore a candidate for high-intensity statin therapy. · Rosuvastatin 20mg daily or atorvastatin 40mg daily would be appropriate options    PLAN:  Will contact provider about starting a high intensity statin.       Thank you,    Nicole Antonio, PharmD  PGY1 Resident  [facility]  [phone] [address]

## 2020-09-22 NOTE — TELEPHONE ENCOUNTER
Nikhil Ward MD - would patient benefit from statin therapy?     Based on history of DM and ASCVD risk of 30%, the patient may benefit from a high-intensity statin. If statin therapy is appropriate, rosuvastatin 20mg daily or atorvastatin 40mg daily would be good options.     Please advise.  I can follow up with patient if appropriate.      Thank you,  Rox Parish, PharmD  PGY1 Resident  [facility]  [phone] [address]

## 2020-09-29 NOTE — TELEPHONE ENCOUNTER
Neto Andres MD - would patient benefit from statin therapy?     Based on history of DM and ASCVD risk of 30%, the patient may benefit from a high-intensity statin. If statin therapy is appropriate, rosuvastatin 20mg daily or atorvastatin 40mg daily would be good options.     Please advise. I can follow up with patient if appropriate.      Thank you,  Ze Russell, PharmD  PGY1 Resident  [address] [place] [address]  [phone] [address]    =========================================================   Noted encounter still open in provider's inbasket. Closing encounter at this time; will re-open if any change to therapy or outreach to patient/family.       For Pharmacy Admin Tracking Only    PHSO: PHSO Patient?: Yes  Total # of Interventions Recommended: Count: 1  - Maintenance Safety Lab Monitoring #: 1  Recommended intervention potential cost savings: 0  Total Interventions Accepted: 0  Time Spent (min): 15

## 2020-10-14 RX ORDER — LISINOPRIL 10 MG/1
TABLET ORAL
Qty: 90 TAB | Refills: 3 | Status: SHIPPED | OUTPATIENT
Start: 2020-10-14 | End: 2021-08-06

## 2020-12-29 ENCOUNTER — TELEPHONE (OUTPATIENT)
Dept: FAMILY MEDICINE CLINIC | Age: 65
End: 2020-12-29

## 2020-12-29 NOTE — TELEPHONE ENCOUNTER
[employer] rep called to check status of faxed request for pt to have placed on some form of statin due to chol levels. Adv no faxed recvd and appt needed. Rep read fax number and has old number listed. Gave correct fax number for their records.

## 2021-04-28 ENCOUNTER — OFFICE VISIT (OUTPATIENT)
Dept: FAMILY MEDICINE CLINIC | Age: 66
End: 2021-04-28
Payer: MEDICARE

## 2021-04-28 VITALS
HEART RATE: 73 BPM | BODY MASS INDEX: 30.53 KG/M2 | DIASTOLIC BLOOD PRESSURE: 67 MMHG | WEIGHT: 190 LBS | SYSTOLIC BLOOD PRESSURE: 128 MMHG | OXYGEN SATURATION: 95 % | RESPIRATION RATE: 20 BRPM | HEIGHT: 66 IN

## 2021-04-28 DIAGNOSIS — I10 ESSENTIAL HYPERTENSION: ICD-10-CM

## 2021-04-28 DIAGNOSIS — R73.03 PREDIABETES: ICD-10-CM

## 2021-04-28 DIAGNOSIS — Z00.00 MEDICARE ANNUAL WELLNESS VISIT, SUBSEQUENT: Primary | ICD-10-CM

## 2021-04-28 PROCEDURE — G0439 PPPS, SUBSEQ VISIT: HCPCS | Performed by: FAMILY MEDICINE

## 2021-04-28 PROCEDURE — G8510 SCR DEP NEG, NO PLAN REQD: HCPCS | Performed by: FAMILY MEDICINE

## 2021-04-28 PROCEDURE — G8536 NO DOC ELDER MAL SCRN: HCPCS | Performed by: FAMILY MEDICINE

## 2021-04-28 PROCEDURE — 1101F PT FALLS ASSESS-DOCD LE1/YR: CPT | Performed by: FAMILY MEDICINE

## 2021-04-28 PROCEDURE — G8752 SYS BP LESS 140: HCPCS | Performed by: FAMILY MEDICINE

## 2021-04-28 PROCEDURE — G8754 DIAS BP LESS 90: HCPCS | Performed by: FAMILY MEDICINE

## 2021-04-28 PROCEDURE — 3017F COLORECTAL CA SCREEN DOC REV: CPT | Performed by: FAMILY MEDICINE

## 2021-04-28 PROCEDURE — G8417 CALC BMI ABV UP PARAM F/U: HCPCS | Performed by: FAMILY MEDICINE

## 2021-04-28 PROCEDURE — G8427 DOCREV CUR MEDS BY ELIG CLIN: HCPCS | Performed by: FAMILY MEDICINE

## 2021-04-28 NOTE — PROGRESS NOTES
Chief Complaint   Patient presents with   [facility] Annual Wellness Visit       Ching Monsalve presents today for   Chief Complaint   Patient presents with    Annual Wellness Visit       Is someone accompanying this pt? No    Is the patient using any DME equipment during OV? No    Depression Screening:  3 most recent PHQ Screens 4/28/2021   Little interest or pleasure in doing things Not at all   Feeling down, depressed, irritable, or hopeless Not at all   Total Score PHQ 2 0       Learning Assessment:  Learning Assessment 4/9/2019   PRIMARY LEARNER Patient   HIGHEST LEVEL OF EDUCATION - PRIMARY LEARNER  > 4 YEARS OF COLLEGE   BARRIERS PRIMARY LEARNER NONE   PRIMARY LANGUAGE ENGLISH   LEARNER PREFERENCE PRIMARY DEMONSTRATION   ANSWERED BY patient   RELATIONSHIP SELF       Fall Risk  Fall Risk Assessment, last 12 mths 8/8/2019   Able to walk? Yes   Fall in past 12 months? No       ADL  No flowsheet data found. Travel Screening:    Travel Screening     Question   Response    In the last month, have you been in contact with someone who was confirmed or suspected to have Coronavirus / COVID-19? No / Unsure    Have you had a COVID-19 viral test in the last 14 days? No    Do you have any of the following new or worsening symptoms? None of these    Have you traveled internationally or domestically in the last month? No      Travel History   Travel since 03/28/21     No documented travel since 03/28/21          Health Maintenance reviewed and discussed and ordered per Provider. Health Maintenance Due   Topic Date Due    COVID-19 Vaccine (1) Never done    DTaP/Tdap/Td series (1 - Tdap) Never done    Shingrix Vaccine Age 50> (1 of 2) Never done    Colorectal Cancer Screening Combo  Never done    AAA Screening 73-67 YO Male Smoking Patients  Never done    Medicare Yearly Exam  12/09/2020    A1C test (Diabetic or Prediabetic)  12/09/2020    Lipid Screen  12/09/2020   . Coordination of Care:  1.  Have you been to the ER, urgent care clinic since your last visit? Hospitalized since your last visit? No    2. Have you seen or consulted any other health care providers outside of the [address] [place] [address] since your last visit? Include any pap smears or colon screening.  No

## 2021-04-28 NOTE — PROGRESS NOTES
This is the Subsequent Medicare Annual Wellness Exam, performed 12 months or more after the Initial AWV or the last Subsequent AWV    I have reviewed the patient's medical history in detail and updated the computerized patient record. Has been well;   Blood sugars have been better; Has CTS right arm;   Needs follow up labs for his chronic conditions    Wt Readings from Last 3 Encounters:   04/28/21 190 lb (86.2 kg)   12/09/19 211 lb (95.7 kg)   08/08/19 208 lb 12.8 oz (94.7 kg)         Assessment/Plan   Education and counseling provided:  Are appropriate based on today's review and evaluation    1. Medicare annual wellness visit, subsequent  2. Prediabetes  -     HEMOGLOBIN A1C WITH EAG; Future  3. Essential hypertension  -     METABOLIC PANEL, COMPREHENSIVE; Future  -     LIPID PANEL; Future       Depression Risk Factor Screening     3 most recent PHQ Screens 4/28/2021   Little interest or pleasure in doing things Not at all   Feeling down, depressed, irritable, or hopeless Not at all   Total Score PHQ 2 0       Alcohol Risk Screen    Do you average more than 1 drink per night or more than 7 drinks a week: No    In the past three months have you have had more than 4 drinks containing alcohol on one occasion: No    BP Readings from Last 3 Encounters:   04/28/21 128/67   12/09/19 136/69   08/08/19 131/73         Functional Ability and Level of Safety    Hearing: Hearing is good. Activities of Daily Living: The home contains: no safety equipment. Patient does total self care      Ambulation: with no difficulty     Fall Risk:  Fall Risk Assessment, last 12 mths 8/8/2019   Able to walk? Yes   Fall in past 12 months?  No      Abuse Screen:  Patient is not abused       Cognitive Screening    Has your family/caregiver stated any concerns about your memory: no     Cognitive Screening: cognition is intact    Health Maintenance Due     Health Maintenance Due   Topic Date Due    DTaP/Tdap/Td series (1 - Tdap) Never done    Shingrix Vaccine Age 50> (1 of 2) Never done    Colorectal Cancer Screening Combo  Never done       Patient Care Team   Patient Care Team:  Dari Bunn MD as PCP - General (Family Medicine)  Dari Bunn MD as PCP - [address] [place], [place] [address]ly Jennings Provider    History     Patient Active Problem List   Diagnosis Code    Prediabetes R73.03    Severe obstructive sleep apnea G47.33    Restless legs syndrome (RLS) G25.81    Hypertension I10    GERD (gastroesophageal reflux disease) K21.9     Past Medical History:   Diagnosis Date    Allergic rhinitis     GERD (gastroesophageal reflux disease)     GERD (gastroesophageal reflux disease)     Hypertension     Insomnia 02/23/2018    External report    Nocturia     Prediabetes     Restless legs syndrome (RLS) 02/23/2018    External report    Severe obstructive sleep apnea 02/23/2018    External report      Past Surgical History:   Procedure Laterality Date    ABDOMEN SURGERY PROC UNLISTED      HX APPENDECTOMY      HX HERNIA REPAIR      HX TONSILLECTOMY       Current Outpatient Medications   Medication Sig Dispense Refill    lisinopriL (PRINIVIL, ZESTRIL) 10 mg tablet TAKE 1 TABLET EVERY DAY 90 Tab 3    metFORMIN (GLUCOPHAGE) 500 mg tablet Take 1 Tab by mouth daily (with breakfast). 90 Tab 3    Fe gluconate/vit C/folic acid (IRON-C PO) Take  by mouth.  SAW PALMETTO PO Take 450 mg by mouth.  fexofenadine (ALLEGRA) 180 mg tablet Take 180 mg by mouth daily.  Omeprazole delayed release (PRILOSEC D/R) 20 mg tablet Take 1 Tab by mouth daily. 30 Tab 6     Allergies   Allergen Reactions    Nifedipine Other (comments)     Facial redness    Other Plant, Animal, Environmental Sneezing     Watery eyes, runny nose, sneezing.     Sulfa (Sulfonamide Antibiotics) Other (comments)     Unknown reaction       Family History   Problem Relation Age of Onset    Alzheimer Mother     COPD Father     Lung Disease Father      Social History     Tobacco Use    Smoking status: Former Smoker     Packs/day: 1.00     Years: 8.00     Pack years: 8.00     Quit date: 1980     Years since quittin.3    Smokeless tobacco: Never Used   Substance Use Topics    Alcohol use: Yes     Alcohol/week: 6.0 standard drinks     Types: 5 Glasses of wine, 1 Shots of liquor per week     Visit Vitals  /67   Pulse 73   Resp 20   Ht 5' 6\" (1.676 m)   Wt 190 lb (86.2 kg)   SpO2 95%   BMI 30.67 kg/m²     General appearance: alert, cooperative, no distress, appears stated age  Neck: supple, symmetrical, trachea midline, no adenopathy, thyroid: not enlarged, symmetric, no tenderness/mass/nodules, no carotid bruit and no JVD  Lungs: clear to auscultation bilaterally  Heart: regular rate and rhythm, S1, S2 normal, no murmur, click, rub or gallop    Extremities: extremities normal, atraumatic, no cyanosis or edema      Diagnoses and all orders for this visit:    1. Medicare annual wellness visit, subsequent    2. Prediabetes  -     HEMOGLOBIN A1C WITH EAG; Future    3. Essential hypertension  -     METABOLIC PANEL, COMPREHENSIVE; Future  -     LIPID PANEL; Future    Other orders  -     METABOLIC PANEL, COMPREHENSIVE  -     LIPID PANEL  -     HEMOGLOBIN A1C WITH EAG    as above,   above all stable unless otherwise noted   treatment plan as listed below  Orders Placed This Encounter    METABOLIC PANEL, COMPREHENSIVE    LIPID PANEL    HEMOGLOBIN A1C WITH EAG    METABOLIC PANEL, COMPREHENSIVE    LIPID PANEL    HEMOGLOBIN A1C WITH EAG     Follow-up and Dispositions    · Return in about 6 months (around 10/28/2021). This has been fully explained to the patient, who indicates understanding. An After Visit Summary was printed and given to the patient.     Lyn Hutchins MD

## 2021-04-28 NOTE — PATIENT INSTRUCTIONS
Medicare Wellness Visit, Male    The best way to live healthy is to have a lifestyle where you eat a well-balanced diet, exercise regularly, limit alcohol use, and quit all forms of tobacco/nicotine, if applicable. Regular preventive services are another way to keep healthy. Preventive services (vaccines, screening tests, monitoring & exams) can help personalize your care plan, which helps you manage your own care. Screening tests can find health problems at the earliest stages, when they are easiest to treat. [facility] follows the current, evidence-based guidelines published by the [facility] ([facility]) when recommending preventive services for our patients. Because we follow these guidelines, sometimes recommendations change over time as research supports it. (For example, a prostate screening blood test is no longer routinely recommended for men with no symptoms). Of course, you and your doctor may decide to screen more often for some diseases, based on your risk and co-morbidities (chronic disease you are already diagnosed with). Preventive services for you include:  - Medicare offers their members a free annual wellness visit, which is time for you and your primary care provider to discuss and plan for your preventive service needs. Take advantage of this benefit every year!  -All adults over age 72 should receive the recommended pneumonia vaccines. Current USPSTF guidelines recommend a series of two vaccines for the best pneumonia protection.   -All adults should have a flu vaccine yearly and tetanus vaccine every 10 years.  -All adults age 48 and older should receive the shingles vaccines (series of two vaccines).        -All adults age 38-68 who are overweight should have a diabetes screening test once every three years.   -Other screening tests & preventive services for persons with diabetes include: an eye exam to screen for diabetic retinopathy, a kidney function test, a foot exam, and stricter control over your cholesterol.   -Cardiovascular screening for adults with routine risk involves an electrocardiogram (ECG) at intervals determined by the provider.   -Colorectal cancer screening should be done for adults age 54-65 with no increased risk factors for colorectal cancer. There are a number of acceptable methods of screening for this type of cancer. Each test has its own benefits and drawbacks. Discuss with your provider what is most appropriate for you during your annual wellness visit. The different tests include: colonoscopy (considered the best screening method), a fecal occult blood test, a fecal DNA test, and sigmoidoscopy.  -All adults born between [facility] should be screened once for Hepatitis C.  -An Abdominal Aortic Aneurysm (AAA) Screening is recommended for men age 73-68 who has ever smoked in their lifetime. Here is a list of your current Health Maintenance items (your personalized list of preventive services) with a due date:  Health Maintenance Due   Topic Date Due    COVID-19 Vaccine (1) Never done    DTaP/Tdap/Td  (1 - Tdap) Never done    Shingles Vaccine (1 of 2) Never done    Colorectal Screening  Never done    AAA Screening  Never done    Annual Well Visit  12/09/2020    Hemoglobin A1C    12/09/2020    Cholesterol Test   12/09/2020        Carpal Tunnel Syndrome: Exercises  Introduction  Here are some examples of exercises for you to try. The exercises may be suggested for a condition or for rehabilitation. Start each exercise slowly. Ease off the exercises if you start to have pain. You will be told when to start these exercises and which ones will work best for you. Warm-up stretches  When you no longer have pain or numbness, you can do exercises to help prevent carpal tunnel syndrome from coming back. Do not do any stretch or movement that is uncomfortable or painful.   1. Rotate your wrist up, down, and from side to side. Repeat 4 times. 2. Stretch your fingers far apart. Relax them, and then stretch them again. Repeat 4 times. 3. Stretch your thumb by pulling it back gently, holding it, and then releasing it. Repeat 4 times. How to do the exercises  Prayer stretch   1. Start with your palms together in front of your chest just below your chin. 2. Slowly lower your hands toward your waistline, keeping your hands close to your stomach and your palms together until you feel a mild to moderate stretch under your forearms. 3. Hold for at least 15 to 30 seconds. Repeat 2 to 4 times. Wrist flexor stretch   1. Extend your arm in front of you with your palm up. 2. Bend your wrist, pointing your hand toward the floor. 3. With your other hand, gently bend your wrist farther until you feel a mild to moderate stretch in your forearm. 4. Hold for at least 15 to 30 seconds. Repeat 2 to 4 times. Wrist extensor stretch   1. Repeat steps 1 through 4 of the stretch above, but begin with your extended hand palm down. Follow-up care is a key part of your treatment and safety. Be sure to make and go to all appointments, and call your doctor if you are having problems. It's also a good idea to know your test results and keep a list of the medicines you take. Where can you learn more? Go to http://www.gray.com/  Enter C109 in the search box to learn more about \"Carpal Tunnel Syndrome: Exercises. \"  Current as of: November 16, 2020               Content Version: 12.8  © 2006-2021 Healthwise, Incorporated. Care instructions adapted under license by [employer] (which disclaims liability or warranty for this information). If you have questions about a medical condition or this instruction, always ask your healthcare professional. [place] [address] any warranty or liability for your use of this information.          Carpal Tunnel Syndrome: Exercises  Introduction  Here are some examples of exercises for you to try. The exercises may be suggested for a condition or for rehabilitation. Start each exercise slowly. Ease off the exercises if you start to have pain. You will be told when to start these exercises and which ones will work best for you. Warm-up stretches  When you no longer have pain or numbness, you can do exercises to help prevent carpal tunnel syndrome from coming back. Do not do any stretch or movement that is uncomfortable or painful. 4. Rotate your wrist up, down, and from side to side. Repeat 4 times. 5. Stretch your fingers far apart. Relax them, and then stretch them again. Repeat 4 times. 6. Stretch your thumb by pulling it back gently, holding it, and then releasing it. Repeat 4 times. How to do the exercises  Prayer stretch   4. Start with your palms together in front of your chest just below your chin. 5. Slowly lower your hands toward your waistline, keeping your hands close to your stomach and your palms together until you feel a mild to moderate stretch under your forearms. 6. Hold for at least 15 to 30 seconds. Repeat 2 to 4 times. Wrist flexor stretch   5. Extend your arm in front of you with your palm up. 6. Bend your wrist, pointing your hand toward the floor. 7. With your other hand, gently bend your wrist farther until you feel a mild to moderate stretch in your forearm. 8. Hold for at least 15 to 30 seconds. Repeat 2 to 4 times. Wrist extensor stretch   2. Repeat steps 1 through 4 of the stretch above, but begin with your extended hand palm down. Follow-up care is a key part of your treatment and safety. Be sure to make and go to all appointments, and call your doctor if you are having problems. It's also a good idea to know your test results and keep a list of the medicines you take. Where can you learn more?   Go to http://www.gray.com/  Enter E144 in the search box to learn more about \"Carpal Tunnel Syndrome: Exercises. \"  Current as of: November 16, 2020               Content Version: 12.8  © 2006-2021 Healthwise, Incorporated. Care instructions adapted under license by [employer] (which disclaims liability or warranty for this information). If you have questions about a medical condition or this instruction, always ask your healthcare professional. [address] any warranty or liability for your use of this information.

## 2021-04-29 LAB
ALBUMIN SERPL-MCNC: 4.2 G/DL (ref 3.8–4.8)
ALBUMIN/GLOB SERPL: 1.4 {RATIO} (ref 1.2–2.2)
ALP SERPL-CCNC: 72 IU/L (ref 39–117)
ALT SERPL-CCNC: 16 IU/L (ref 0–44)
AST SERPL-CCNC: 25 IU/L (ref 0–40)
BILIRUB SERPL-MCNC: 0.4 MG/DL (ref 0–1.2)
BUN SERPL-MCNC: 21 MG/DL (ref 8–27)
BUN/CREAT SERPL: 20 (ref 10–24)
CALCIUM SERPL-MCNC: 9 MG/DL (ref 8.6–10.2)
CHLORIDE SERPL-SCNC: 106 MMOL/L (ref 96–106)
CHOLEST SERPL-MCNC: 168 MG/DL (ref 100–199)
CO2 SERPL-SCNC: 24 MMOL/L (ref 20–29)
CREAT SERPL-MCNC: 1.05 MG/DL (ref 0.76–1.27)
EST. AVERAGE GLUCOSE BLD GHB EST-MCNC: 123 MG/DL
GLOBULIN SER CALC-MCNC: 3 G/DL (ref 1.5–4.5)
GLUCOSE SERPL-MCNC: 109 MG/DL (ref 65–99)
HBA1C MFR BLD: 5.9 % (ref 4.8–5.6)
HDLC SERPL-MCNC: 59 MG/DL
LDLC SERPL CALC-MCNC: 98 MG/DL (ref 0–99)
POTASSIUM SERPL-SCNC: 5.4 MMOL/L (ref 3.5–5.2)
PROT SERPL-MCNC: 7.2 G/DL (ref 6–8.5)
SODIUM SERPL-SCNC: 140 MMOL/L (ref 134–144)
TRIGL SERPL-MCNC: 55 MG/DL (ref 0–149)
VLDLC SERPL CALC-MCNC: 11 MG/DL (ref 5–40)

## 2021-08-06 RX ORDER — LISINOPRIL 10 MG/1
TABLET ORAL
Qty: 90 TABLET | Refills: 3 | Status: SHIPPED | OUTPATIENT
Start: 2021-08-06

## 2021-08-06 RX ORDER — METFORMIN HYDROCHLORIDE 500 MG/1
TABLET ORAL
Qty: 90 TABLET | Refills: 3 | Status: SHIPPED | OUTPATIENT
Start: 2021-08-06 | End: 2022-06-11

## 2022-03-14 ENCOUNTER — TELEPHONE (OUTPATIENT)
Dept: PHARMACY | Age: 67
End: 2022-03-14

## 2022-03-16 ENCOUNTER — TELEPHONE (OUTPATIENT)
Dept: FAMILY MEDICINE CLINIC | Age: 67
End: 2022-03-16

## 2022-03-16 NOTE — TELEPHONE ENCOUNTER
Gerri Lopez MD, from below, appt with you 3/16/22  - Chart reviewed d/t insurer-identified gap: diabetes rx claims and no statin rx claim; per chart, patient taking metformin for pre-diabetes (NO diabetes diagnosis)    If appropriate for no statin therapy at this time, please consider using the following CMS allowable diagnosis within visit encounter:   Prediabetes (R73.03)   - This will complete/close this insurer-identified gap for this calendar year    Thank you,  Danielle Mcfadden, PharmD, [address] [facility], toll free: [phone], option 2    ==============================================================  POPULATION [facility]ALTH CLINICAL PHARMACY: STATIN THERAPY REVIEW  Identified statin use in persons with diabetes care gap per [facility] IDENTIFIED    Lab Results   Component Value Date/Time    Cholesterol, total 168 04/28/2021 03:14 AM    HDL Cholesterol 59 04/28/2021 03:14 AM    LDL, calculated 98 04/28/2021 03:14 AM    VLDL, calculated 11 04/28/2021 03:14 AM    Triglyceride 55 04/28/2021 03:14 AM     ALT (SGPT)   Date Value Ref Range Status   04/28/2021 16 0 - 44 IU/L Final     AST (SGOT)   Date Value Ref Range Status   04/28/2021 25 0 - 40 IU/L Final     The 10-year ASCVD risk score (Rajiv Smith, et al., 2013) is: 14.1%    Values used to calculate the score:      Age: 78 years      Sex: Male      Is Non- : No      Diabetic: No      Tobacco smoker: No      Systolic Blood Pressure: 855 mmHg      Is BP treated: Yes      HDL Cholesterol: 59 mg/dL      Total Cholesterol: 168 mg/dL     Hyperlipidemia Goal: 10-year ASCVD risk intermediate, may benefit from statin therapy. Patient with HTN and pre-diabetes. LDL <100.       PLAN  - Continue to monitor   - If no statin at this time, consider CMS allowable diagnosis code to close payor-identified statin gap    Future Appointments   Date Time Provider Department Center   3/16/2022  5:00 PM Kavitha Aguirre MD [facility] BS AMB

## 2022-03-17 NOTE — TELEPHONE ENCOUNTER
Chiquis Mosqueda MD, from below, appt with you 3/16/22  - Chart reviewed d/t insurer-identified gap: diabetes rx claims and no statin rx claim; per chart, patient taking metformin for pre-diabetes (NO diabetes diagnosis)     If appropriate for no statin therapy at this time, please consider using the following CMS allowable diagnosis within visit encounter:  · Prediabetes (R73.03)   - This will complete/close this insurer-identified gap for this calendar year     Thank you,  [facility] MEDICINE Bogdan, PharmD, [address] [facility]  Department, toll free: [phone], option 2     ==============================================================    As noted, office was trying to contact patient to reschedule yesterday's appointment. Does not appear patient attended appointment yesterday, but also not yet rescheduled. Will close this encounter for now.     For Pharmacy Admin Tracking Only     Time Spent (min): 15

## 2022-03-19 PROBLEM — G25.81 RESTLESS LEGS SYNDROME (RLS): Status: ACTIVE | Noted: 2018-02-23

## 2022-03-19 PROBLEM — G47.33 SEVERE OBSTRUCTIVE SLEEP APNEA: Status: ACTIVE | Noted: 2018-02-23

## 2022-04-29 ENCOUNTER — TELEPHONE (OUTPATIENT)
Dept: PHARMACY | Age: 67
End: 2022-04-29

## 2022-04-29 NOTE — TELEPHONE ENCOUNTER
Khadijah Moore MD , from below, appt with you 5/2/22  - Chart reviewed d/t insurer-identified gap: diabetes rx claims and no statin rx claim; per chart, patient taking metformin for pre-diabetes (NO diabetes diagnosis)     If appropriate for no statin therapy at this time, please consider using the following CMS allowable diagnosis within visit encounter:  · Prediabetes (R73.03)   - This will complete/close this insurer-identified gap for this calendar year    Thank you,  [facility] MEDICINE Bogdan, PharmD, [address] [facility]  Department, toll free: [phone], option 2     ==============================================================  POPULATION HEALTH CLINICAL PHARMACY: STATIN THERAPY REVIEW  Identified statin use in persons with diabetes care gap per [facility] IDENTIFIED    Lab Results   Component Value Date/Time    Cholesterol, total 168 04/28/2021 03:14 AM    HDL Cholesterol 59 04/28/2021 03:14 AM    LDL, calculated 98 04/28/2021 03:14 AM    LDL, calculated 130 (H) 12/09/2019 10:23 AM    VLDL, calculated 11 04/28/2021 03:14 AM    VLDL, calculated 21 12/09/2019 10:23 AM    Triglyceride 55 04/28/2021 03:14 AM     ALT (SGPT)   Date Value Ref Range Status   04/28/2021 16 0 - 44 IU/L Final     AST (SGOT)   Date Value Ref Range Status   04/28/2021 25 0 - 40 IU/L Final     The 10-year ASCVD risk score (Reji Tran, et al., 2013) is: 15.3%    Values used to calculate the score:      Age: 75 years      Sex: Male      Is Non- : No      Diabetic: No      Tobacco smoker: No      Systolic Blood Pressure: 528 mmHg      Is BP treated: Yes      HDL Cholesterol: 59 mg/dL      Total Cholesterol: 168 mg/dL     Hyperlipidemia Goal: 10-year ASCVD risk intermediate, may benefit from statin therapy. Patient with HTN and pre-diabetes. LDL <100.         PLAN  - Continue to monitor   - If no statin at this time, consider CMS allowable diagnosis code to close payor-identified statin gap      Future Appointments   Date Time Provider Jass Zurita   5/2/2022 11:00 AM Osvaldo Owen MD [facility] BS AMB

## 2022-05-02 ENCOUNTER — OFFICE VISIT (OUTPATIENT)
Dept: FAMILY MEDICINE CLINIC | Age: 67
End: 2022-05-02
Payer: MEDICARE

## 2022-05-02 VITALS
RESPIRATION RATE: 16 BRPM | DIASTOLIC BLOOD PRESSURE: 62 MMHG | TEMPERATURE: 97.4 F | WEIGHT: 197.6 LBS | OXYGEN SATURATION: 98 % | HEIGHT: 66 IN | SYSTOLIC BLOOD PRESSURE: 129 MMHG | HEART RATE: 61 BPM | BODY MASS INDEX: 31.76 KG/M2

## 2022-05-02 DIAGNOSIS — R73.09 ELEVATED HEMOGLOBIN A1C: ICD-10-CM

## 2022-05-02 DIAGNOSIS — Z00.00 MEDICARE ANNUAL WELLNESS VISIT, SUBSEQUENT: Primary | ICD-10-CM

## 2022-05-02 DIAGNOSIS — R73.03 PREDIABETES: ICD-10-CM

## 2022-05-02 DIAGNOSIS — Z23 ENCOUNTER FOR IMMUNIZATION: ICD-10-CM

## 2022-05-02 DIAGNOSIS — I10 ESSENTIAL HYPERTENSION: ICD-10-CM

## 2022-05-02 PROCEDURE — G8427 DOCREV CUR MEDS BY ELIG CLIN: HCPCS | Performed by: FAMILY MEDICINE

## 2022-05-02 PROCEDURE — G0009 ADMIN PNEUMOCOCCAL VACCINE: HCPCS | Performed by: FAMILY MEDICINE

## 2022-05-02 PROCEDURE — G8536 NO DOC ELDER MAL SCRN: HCPCS | Performed by: FAMILY MEDICINE

## 2022-05-02 PROCEDURE — G0439 PPPS, SUBSEQ VISIT: HCPCS | Performed by: FAMILY MEDICINE

## 2022-05-02 PROCEDURE — 90750 HZV VACC RECOMBINANT IM: CPT | Performed by: FAMILY MEDICINE

## 2022-05-02 PROCEDURE — 3017F COLORECTAL CA SCREEN DOC REV: CPT | Performed by: FAMILY MEDICINE

## 2022-05-02 PROCEDURE — G8510 SCR DEP NEG, NO PLAN REQD: HCPCS | Performed by: FAMILY MEDICINE

## 2022-05-02 PROCEDURE — G8754 DIAS BP LESS 90: HCPCS | Performed by: FAMILY MEDICINE

## 2022-05-02 PROCEDURE — 90732 PPSV23 VACC 2 YRS+ SUBQ/IM: CPT | Performed by: FAMILY MEDICINE

## 2022-05-02 PROCEDURE — G8752 SYS BP LESS 140: HCPCS | Performed by: FAMILY MEDICINE

## 2022-05-02 PROCEDURE — 1101F PT FALLS ASSESS-DOCD LE1/YR: CPT | Performed by: FAMILY MEDICINE

## 2022-05-02 PROCEDURE — G8417 CALC BMI ABV UP PARAM F/U: HCPCS | Performed by: FAMILY MEDICINE

## 2022-05-02 NOTE — PROGRESS NOTES
Patient received Pneumococcal-23 vaccine 0.5ml in left deltoid. Tolerated well. No signs or symptoms of distress noted. Most current VIS given and consent signed. Patient received SHINGRIX vaccine 0.5ml in right deltoid. Tolerated well. No signs or symptoms of distress noted. Most current VIS given and consent signed.

## 2022-05-02 NOTE — PROGRESS NOTES
This is the Subsequent Medicare Annual Wellness Exam, performed 12 months or more after the Initial AWV or the last Subsequent AWV    I have reviewed the patient's medical history in detail and updated the computerized patient record. Pt has been well; He is due for blood work; Pt also agrees to Pneumonia Vaccine; He has no new physical complaints. He still works and stays active. Declines AAA screening at this time    Assessment/Plan   Education and counseling provided:  Are appropriate based on today's review and evaluation  End-of-Life planning (with patient's consent)    1. Medicare annual wellness visit, subsequent  2. Prediabetes  3. Elevated hemoglobin A1c  -     HEMOGLOBIN A1C WITH EAG; Future  4. Essential hypertension  -     LIPID PANEL; Future  -     METABOLIC PANEL, COMPREHENSIVE; Future  5. Encounter for immunization  -     ZOSTER VACC RECOMBINANT ADJUVANTED  -     PNEUMOCOCCAL POLYSACCHARIDE VACCINE, 23-VALENT, ADULT OR IMMUNOSUPPRESSED PT DOSE,       As above   treatment plan as listed below  Orders Placed This Encounter    Zoster VACC Recominant Adjuvanted (Shingrix)    Pneumococcal polysaccharide vaccine, 23-valent, adult or immunosuppressed pt dose    LIPID PANEL    METABOLIC PANEL, COMPREHENSIVE    HEMOGLOBIN A1C WITH EAG    METABOLIC PANEL, COMPREHENSIVE    LIPID PANEL    HEMOGLOBIN A1C WITH EAG     Labs and immunizations as ordered  Follow-up and Dispositions    · Return for htn, prediabetes. This has been fully explained to the patient, who indicates understanding. An After Visit Summary was printed and given to the patient.       Depression Risk Factor Screening     3 most recent PHQ Screens 5/2/2022   Little interest or pleasure in doing things Not at all   Feeling down, depressed, irritable, or hopeless Not at all   Total Score PHQ 2 0       Alcohol & Drug Abuse Risk Screen    Do you average more than 1 drink per night or more than 7 drinks a week: No    In the past three months have you have had more than 4 drinks containing alcohol on one occasion: No          Functional Ability and Level of Safety    Hearing: Hearing is good. Activities of Daily Living: The home contains: no safety equipment. Patient does total self care      Ambulation: with mild difficulty     Fall Risk:  Fall Risk Assessment, last 12 mths 5/2/2022   Able to walk? Yes   Fall in past 12 months? 0   Do you feel unsteady? 0   Are you worried about falling 0      Abuse Screen:  Patient is not abused       Cognitive Screening    Has your family/caregiver stated any concerns about your memory: no     Cognitive Screening: cognitiion intact. Health Maintenance Due     Health Maintenance Due   Topic Date Due    DTaP/Tdap/Td series (1 - Tdap) Never done    AAA Screening 73-69 YO Male Smoking Patients  Never done       Patient Care Team   Patient Care Team:  Jourdan Baker MD as PCP - General (Family Medicine)  Jourdan Baker MD as PCP - [facility] Empaneled Provider    History     Patient Active Problem List   Diagnosis Code    Prediabetes R73.03    Severe obstructive sleep apnea G47.33    Restless legs syndrome (RLS) G25.81    Hypertension I10    GERD (gastroesophageal reflux disease) K21.9     Past Medical History:   Diagnosis Date    Allergic rhinitis     GERD (gastroesophageal reflux disease)     GERD (gastroesophageal reflux disease)     Hypertension     Insomnia 02/23/2018    External report    Nocturia     Prediabetes     Restless legs syndrome (RLS) 02/23/2018    External report    Severe obstructive sleep apnea 02/23/2018    External report      Past Surgical History:   Procedure Laterality Date    HX APPENDECTOMY      HX HERNIA REPAIR      HX TONSILLECTOMY      PA ABDOMEN SURGERY PROC UNLISTED       Current Outpatient Medications   Medication Sig Dispense Refill    metFORMIN (GLUCOPHAGE) 500 mg tablet TAKE 1 TABLET BY MOUTH DAILY (WITH BREAKFAST).  90 Tablet 3    lisinopriL (PRINIVIL, ZESTRIL) 10 mg tablet TAKE 1 TABLET EVERY DAY 90 Tablet 3    Fe gluconate/vit C/folic acid (IRON-C PO) Take  by mouth.  SAW PALMETTO PO Take 450 mg by mouth.  fexofenadine (ALLEGRA) 180 mg tablet Take 180 mg by mouth daily.  Omeprazole delayed release (PRILOSEC D/R) 20 mg tablet Take 1 Tab by mouth daily. 30 Tab 6     Allergies   Allergen Reactions    Nifedipine Other (comments)     Facial redness    Other Plant, Animal, Environmental Sneezing     Watery eyes, runny nose, sneezing.  Sulfa (Sulfonamide Antibiotics) Other (comments)     Unknown reaction       Family History   Problem Relation Age of Onset    Alzheimer's Disease Mother     COPD Father     Lung Disease Father      Social History     Tobacco Use    Smoking status: Former Smoker     Packs/day: 1.00     Years: 8.00     Pack years: 8.00     Quit date: 1980     Years since quittin.3    Smokeless tobacco: Never Used   Substance Use Topics    Alcohol use:  Yes     Alcohol/week: 6.0 standard drinks     Types: 5 Glasses of wine, 1 Shots of liquor per week         Elvia Atkinson

## 2022-05-02 NOTE — PATIENT INSTRUCTIONS
Medicare Wellness Visit, Male    The best way to live healthy is to have a lifestyle where you eat a well-balanced diet, exercise regularly, limit alcohol use, and quit all forms of tobacco/nicotine, if applicable. Regular preventive services are another way to keep healthy. Preventive services (vaccines, screening tests, monitoring & exams) can help personalize your care plan, which helps you manage your own care. Screening tests can find health problems at the earliest stages, when they are easiest to treat. [facility] follows the current, evidence-based guidelines published by the [facility] ([facility]) when recommending preventive services for our patients. Because we follow these guidelines, sometimes recommendations change over time as research supports it. (For example, a prostate screening blood test is no longer routinely recommended for men with no symptoms). Of course, you and your doctor may decide to screen more often for some diseases, based on your risk and co-morbidities (chronic disease you are already diagnosed with). Preventive services for you include:  - Medicare offers their members a free annual wellness visit, which is time for you and your primary care provider to discuss and plan for your preventive service needs. Take advantage of this benefit every year!  -All adults over age 72 should receive the recommended pneumonia vaccines. Current USPSTF guidelines recommend a series of two vaccines for the best pneumonia protection.   -All adults should have a flu vaccine yearly and tetanus vaccine every 10 years.  -All adults age 48 and older should receive the shingles vaccines (series of two vaccines).        -All adults age 38-68 who are overweight should have a diabetes screening test once every three years.   -Other screening tests & preventive services for persons with diabetes include: an eye exam to screen for diabetic retinopathy, a kidney function test, a foot exam, and stricter control over your cholesterol.   -Cardiovascular screening for adults with routine risk involves an electrocardiogram (ECG) at intervals determined by the provider.   -Colorectal cancer screening should be done for adults age 54-65 with no increased risk factors for colorectal cancer. There are a number of acceptable methods of screening for this type of cancer. Each test has its own benefits and drawbacks. Discuss with your provider what is most appropriate for you during your annual wellness visit. The different tests include: colonoscopy (considered the best screening method), a fecal occult blood test, a fecal DNA test, and sigmoidoscopy.  -All adults born between [facility] should be screened once for Hepatitis C.  -An Abdominal Aortic Aneurysm (AAA) Screening is recommended for men age 73-68 who has ever smoked in their lifetime.      Here is a list of your current Health Maintenance items (your personalized list of preventive services) with a due date:  Health Maintenance Due   Topic Date Due    DTaP/Tdap/Td  (1 - Tdap) Never done

## 2022-05-02 NOTE — PROGRESS NOTES
1. \"Have you been to the ER, urgent care clinic since your last visit? Hospitalized since your last visit? \" No    2. \"Have you seen or consulted any other health care providers outside of the [address] [place] [address] since your last visit? \" No     3. For patients aged 39-70: Has the patient had a colonoscopy / FIT/ Cologuard?  Yes - no Care Gap present

## 2022-05-03 LAB
ALBUMIN SERPL-MCNC: 4.3 G/DL (ref 3.8–4.8)
ALBUMIN/GLOB SERPL: 1.7 {RATIO} (ref 1.2–2.2)
ALP SERPL-CCNC: 69 IU/L (ref 44–121)
ALT SERPL-CCNC: 20 IU/L (ref 0–44)
AST SERPL-CCNC: 18 IU/L (ref 0–40)
BILIRUB SERPL-MCNC: 0.7 MG/DL (ref 0–1.2)
BUN SERPL-MCNC: 19 MG/DL (ref 8–27)
BUN/CREAT SERPL: 18 (ref 10–24)
CALCIUM SERPL-MCNC: 8.8 MG/DL (ref 8.6–10.2)
CHLORIDE SERPL-SCNC: 106 MMOL/L (ref 96–106)
CHOLEST SERPL-MCNC: 196 MG/DL (ref 100–199)
CO2 SERPL-SCNC: 25 MMOL/L (ref 20–29)
CREAT SERPL-MCNC: 1.03 MG/DL (ref 0.76–1.27)
EGFR: 79 ML/MIN/1.73
EST. AVERAGE GLUCOSE BLD GHB EST-MCNC: 131 MG/DL
GLOBULIN SER CALC-MCNC: 2.6 G/DL (ref 1.5–4.5)
GLUCOSE SERPL-MCNC: 109 MG/DL (ref 65–99)
HBA1C MFR BLD: 6.2 % (ref 4.8–5.6)
HDLC SERPL-MCNC: 57 MG/DL
LDLC SERPL CALC-MCNC: 119 MG/DL (ref 0–99)
POTASSIUM SERPL-SCNC: 5.1 MMOL/L (ref 3.5–5.2)
PROT SERPL-MCNC: 6.9 G/DL (ref 6–8.5)
SODIUM SERPL-SCNC: 142 MMOL/L (ref 134–144)
TRIGL SERPL-MCNC: 113 MG/DL (ref 0–149)
VLDLC SERPL CALC-MCNC: 20 MG/DL (ref 5–40)

## 2022-05-03 NOTE — TELEPHONE ENCOUNTER
Noted OV yesterday with prediabetes dx, thank you for reviewing!    =========================================================   For Pharmacy Admin Tracking Only     CPA in place: No   Recommendation Provided To: Provider: 1 via Note to Provider    Gap Closed?: Yes   Intervention Accepted By: Provider: 1   Time Spent (min): 15

## 2022-06-11 RX ORDER — METFORMIN HYDROCHLORIDE 500 MG/1
TABLET ORAL
Qty: 90 TABLET | Refills: 3 | Status: SHIPPED | OUTPATIENT
Start: 2022-06-11

## 2022-11-07 ENCOUNTER — OFFICE VISIT (OUTPATIENT)
Dept: FAMILY MEDICINE CLINIC | Age: 67
End: 2022-11-07
Payer: MEDICARE

## 2022-11-07 VITALS
HEIGHT: 66 IN | TEMPERATURE: 97.3 F | SYSTOLIC BLOOD PRESSURE: 136 MMHG | HEART RATE: 65 BPM | OXYGEN SATURATION: 98 % | WEIGHT: 193 LBS | DIASTOLIC BLOOD PRESSURE: 66 MMHG | BODY MASS INDEX: 31.02 KG/M2 | RESPIRATION RATE: 16 BRPM

## 2022-11-07 DIAGNOSIS — I10 ESSENTIAL HYPERTENSION: ICD-10-CM

## 2022-11-07 DIAGNOSIS — R53.83 OTHER FATIGUE: ICD-10-CM

## 2022-11-07 DIAGNOSIS — N32.81 OVERACTIVE BLADDER: ICD-10-CM

## 2022-11-07 DIAGNOSIS — Z23 ENCOUNTER FOR IMMUNIZATION: ICD-10-CM

## 2022-11-07 DIAGNOSIS — R39.14 FEELING OF INCOMPLETE BLADDER EMPTYING: ICD-10-CM

## 2022-11-07 DIAGNOSIS — R73.09 ELEVATED HEMOGLOBIN A1C: Primary | ICD-10-CM

## 2022-11-07 PROCEDURE — G8536 NO DOC ELDER MAL SCRN: HCPCS | Performed by: FAMILY MEDICINE

## 2022-11-07 PROCEDURE — G8752 SYS BP LESS 140: HCPCS | Performed by: FAMILY MEDICINE

## 2022-11-07 PROCEDURE — 3074F SYST BP LT 130 MM HG: CPT | Performed by: FAMILY MEDICINE

## 2022-11-07 PROCEDURE — 90750 HZV VACC RECOMBINANT IM: CPT | Performed by: FAMILY MEDICINE

## 2022-11-07 PROCEDURE — G8427 DOCREV CUR MEDS BY ELIG CLIN: HCPCS | Performed by: FAMILY MEDICINE

## 2022-11-07 PROCEDURE — G8417 CALC BMI ABV UP PARAM F/U: HCPCS | Performed by: FAMILY MEDICINE

## 2022-11-07 PROCEDURE — 99214 OFFICE O/P EST MOD 30 MIN: CPT | Performed by: FAMILY MEDICINE

## 2022-11-07 PROCEDURE — G8510 SCR DEP NEG, NO PLAN REQD: HCPCS | Performed by: FAMILY MEDICINE

## 2022-11-07 PROCEDURE — 3078F DIAST BP <80 MM HG: CPT | Performed by: FAMILY MEDICINE

## 2022-11-07 PROCEDURE — 1101F PT FALLS ASSESS-DOCD LE1/YR: CPT | Performed by: FAMILY MEDICINE

## 2022-11-07 PROCEDURE — 1124F ACP DISCUSS-NO DSCNMKR DOCD: CPT | Performed by: FAMILY MEDICINE

## 2022-11-07 PROCEDURE — 3017F COLORECTAL CA SCREEN DOC REV: CPT | Performed by: FAMILY MEDICINE

## 2022-11-07 PROCEDURE — G8754 DIAS BP LESS 90: HCPCS | Performed by: FAMILY MEDICINE

## 2022-11-07 RX ORDER — TOLTERODINE 4 MG/1
4 CAPSULE, EXTENDED RELEASE ORAL DAILY
Qty: 90 CAPSULE | Refills: 3 | Status: SHIPPED | OUTPATIENT
Start: 2022-11-07

## 2022-11-07 NOTE — PROGRESS NOTES
HPI:  Tammie Vazquez is a 75 y.o. male who presents today with   Chief Complaint   Patient presents with    Blood sugar problem     6 mf/u    Hypertension        Pt has noted going to the bathroom 5 times last night to urinate. Some urinary urgency. He does report some incomplete voiding symptoms as well as urinary hesitancy. Denies any pain. Denies any fever. Patient is here to follow-up on prediabetes and high blood pressure as well. His blood pressure is stable. His last A1c was 6.2 as noted below. Lab Results   Component Value Date/Time    Hemoglobin A1c 6.2 (H) 05/02/2022 12:19 PM       Patient would like to get a testosterone level. He states he has fatigue and wonders if this is contributory. 3 most recent PHQ Screens 11/7/2022   Little interest or pleasure in doing things Not at all   Feeling down, depressed, irritable, or hopeless Not at all   Total Score PHQ 2 0               PMH,  Meds, Allergies, Family History, Social history reviewed      Current Outpatient Medications   Medication Sig Dispense Refill    tolterodine ER (DETROL LA) 4 mg ER capsule Take 1 Capsule by mouth daily. 90 Capsule 3    metFORMIN (GLUCOPHAGE) 500 mg tablet TAKE 1 TABLET BY MOUTH DAILY (WITH BREAKFAST). 90 Tablet 3    lisinopriL (PRINIVIL, ZESTRIL) 10 mg tablet TAKE 1 TABLET EVERY DAY 90 Tablet 3    Fe gluconate/vit C/folic acid (IRON-C PO) Take  by mouth. SAW PALMETTO PO Take 450 mg by mouth. fexofenadine (ALLEGRA) 180 mg tablet Take 180 mg by mouth daily. Omeprazole delayed release (PRILOSEC D/R) 20 mg tablet Take 1 Tab by mouth daily. 30 Tab 6        Allergies   Allergen Reactions    Nifedipine Other (comments)     Facial redness    Other Plant, Animal, Environmental Sneezing     Watery eyes, runny nose, sneezing.     Sulfa (Sulfonamide Antibiotics) Other (comments)     Unknown reaction                  ROS as per HPI      Visit Vitals  /66 (BP 1 Location: Left upper arm, BP Patient Position: Sitting, BP Cuff Size: Adult long)   Pulse 65   Temp 97.3 °F (36.3 °C) (Temporal)   Resp 16   Ht 5' 6\" (1.676 m)   Wt 193 lb (87.5 kg)   SpO2 98%   BMI 31.15 kg/m²     Physical Exam    General appearance: alert, cooperative, no distress, appears stated age  Neck: supple, symmetrical, trachea midline, no adenopathy, thyroid: not enlarged, symmetric, no tenderness/mass/nodules, no carotid bruit and no JVD  Lungs: clear to auscultation bilaterally  Heart: regular rate and rhythm, S1, S2 normal, no murmur, click, rub or gallop    Extremities: extremities normal, atraumatic, no cyanosis or edema    Prostate exam: smooth and symmetric without nodules or tenderness, full. .        Assessment/Plan:  Diagnoses and all orders for this visit:    1. Elevated hemoglobin A1c  -     HEMOGLOBIN A1C WITH EAG; Future    2. Essential hypertension  -     LIPID PANEL; Future  -     METABOLIC PANEL, COMPREHENSIVE; Future  -     HEMOGLOBIN A1C WITH EAG; Future    3. Overactive bladder    4. Feeling of incomplete bladder emptying  -     PSA W/ REFLX FREE PSA; Future  -     TESTOSTERONE, FREE & TOTAL; Future    5. Other fatigue  -     TESTOSTERONE, FREE & TOTAL; Future    6. Encounter for immunization  -     ZOSTER, SHINGRIX, (18 YRS +), IM    Other orders  -     tolterodine ER (DETROL LA) 4 mg ER capsule; Take 1 Capsule by mouth daily. As above  Labs  as ordered  Detrol as ordered  Shingles vaccination today  An After Visit Summary was printed and given to the patient. This has been fully explained to the patient, who indicates understanding. Follow-up and Dispositions    Return in about 6 months (around 5/7/2023) for prediabetes.             Rubio Stauffer MD

## 2022-11-07 NOTE — PROGRESS NOTES
1. \"Have you been to the ER, urgent care clinic since your last visit? Hospitalized since your last visit? \" No    2. \"Have you seen or consulted any other health care providers outside of the [address] [place] [address] since your last visit? \" No     3. For patients aged 39-70: Has the patient had a colonoscopy / FIT/ Cologuard? Abida    Monie Corley denies any symptoms , reactions or allergies that would exclude them from being immunized today. Risks and adverse reactions were discussed and the most current VIS was given to them along with consent signed. All questions were addressed. Patient received SHINGRIX vaccine 0.5mL in left deltoid. Tolerated well. No signs or symptoms of distress noted. Patient left office ambulatory.

## 2022-11-08 ENCOUNTER — LAB ONLY (OUTPATIENT)
Dept: FAMILY MEDICINE CLINIC | Age: 67
End: 2022-11-08

## 2022-11-08 DIAGNOSIS — I10 ESSENTIAL HYPERTENSION: ICD-10-CM

## 2022-11-08 DIAGNOSIS — R39.14 FEELING OF INCOMPLETE BLADDER EMPTYING: ICD-10-CM

## 2022-11-08 DIAGNOSIS — R73.09 ELEVATED HEMOGLOBIN A1C: ICD-10-CM

## 2022-11-08 DIAGNOSIS — R53.83 OTHER FATIGUE: ICD-10-CM

## 2022-11-10 LAB
CHOLEST SERPL-MCNC: 166 MG/DL (ref 100–199)
EST. AVERAGE GLUCOSE BLD GHB EST-MCNC: 123 MG/DL
HBA1C MFR BLD: 5.9 % (ref 4.8–5.6)
HDLC SERPL-MCNC: 55 MG/DL
LDLC SERPL CALC-MCNC: 99 MG/DL (ref 0–99)
SPECIMEN STATUS REPORT, ROLRST: NORMAL
TRIGL SERPL-MCNC: 63 MG/DL (ref 0–149)
VLDLC SERPL CALC-MCNC: 12 MG/DL (ref 5–40)

## 2022-11-11 LAB
ALBUMIN SERPL-MCNC: 4.6 G/DL (ref 3.8–4.8)
ALBUMIN/GLOB SERPL: 2 {RATIO} (ref 1.2–2.2)
ALP SERPL-CCNC: 64 IU/L (ref 44–121)
ALT SERPL-CCNC: 17 IU/L (ref 0–44)
AST SERPL-CCNC: 21 IU/L (ref 0–40)
BILIRUB SERPL-MCNC: 0.7 MG/DL (ref 0–1.2)
BUN SERPL-MCNC: 25 MG/DL (ref 8–27)
BUN/CREAT SERPL: 26 (ref 10–24)
CALCIUM SERPL-MCNC: 8.8 MG/DL (ref 8.6–10.2)
CHLORIDE SERPL-SCNC: 104 MMOL/L (ref 96–106)
CO2 SERPL-SCNC: 25 MMOL/L (ref 20–29)
CREAT SERPL-MCNC: 0.98 MG/DL (ref 0.76–1.27)
EGFR: 84 ML/MIN/1.73
GLOBULIN SER CALC-MCNC: 2.3 G/DL (ref 1.5–4.5)
GLUCOSE SERPL-MCNC: 106 MG/DL (ref 70–99)
POTASSIUM SERPL-SCNC: 4.8 MMOL/L (ref 3.5–5.2)
PROT SERPL-MCNC: 6.9 G/DL (ref 6–8.5)
PSA SERPL-MCNC: 1.4 NG/ML (ref 0–4)
REFLEX CRITERIA: NORMAL
SODIUM SERPL-SCNC: 139 MMOL/L (ref 134–144)
TESTOST FREE SERPL-MCNC: 9.9 PG/ML (ref 6.6–18.1)
TESTOST SERPL-MCNC: 245 NG/DL (ref 264–916)

## 2022-11-13 NOTE — PROGRESS NOTES
Your testosterone level is slightly decreased. This could explain some of the fatigue. A referral to urology can be considered for replacement therapy. Your A1c has improved. Your cholesterol profile is within normal range.

## 2022-12-05 RX ORDER — LISINOPRIL 10 MG/1
TABLET ORAL
Qty: 90 TABLET | Refills: 3 | Status: SHIPPED | OUTPATIENT
Start: 2022-12-05

## 2022-12-27 ENCOUNTER — TELEPHONE (OUTPATIENT)
Dept: FAMILY MEDICINE CLINIC | Age: 67
End: 2022-12-27

## 2022-12-27 NOTE — TELEPHONE ENCOUNTER
Patient transferred from [facility] with complaints of cough that he has had for about 4 weeks now. [facility] and [facility] but still has the cough. Patient was offered the next available virtual for January 5.  Patient declined stating \"I can't wait that long, I'll just [facility] to [facility].\" Call was then disconnected

## 2023-03-02 ENCOUNTER — HOSPITAL ENCOUNTER (INPATIENT)
Facility: HOSPITAL | Age: 68
LOS: 20 days | Discharge: ANOTHER HOSPITAL | DRG: 438 | End: 2023-03-22
Attending: EMERGENCY MEDICINE | Admitting: STUDENT IN AN ORGANIZED HEALTH CARE EDUCATION/TRAINING PROGRAM
Payer: MEDICARE

## 2023-03-02 ENCOUNTER — APPOINTMENT (OUTPATIENT)
Facility: HOSPITAL | Age: 68
DRG: 438 | End: 2023-03-02
Payer: MEDICARE

## 2023-03-02 DIAGNOSIS — K85.90 ACUTE PANCREATITIS WITHOUT INFECTION OR NECROSIS, UNSPECIFIED PANCREATITIS TYPE: Primary | ICD-10-CM

## 2023-03-02 PROBLEM — K85.80 PANCREATITIS, REFLUX: Status: ACTIVE | Noted: 2023-03-02

## 2023-03-02 PROBLEM — G25.81 RESTLESS LEGS SYNDROME (RLS): Status: ACTIVE | Noted: 2018-02-23

## 2023-03-02 PROBLEM — G47.33 SEVERE OBSTRUCTIVE SLEEP APNEA: Status: ACTIVE | Noted: 2018-02-23

## 2023-03-02 LAB
ALBUMIN SERPL-MCNC: 3.9 G/DL (ref 3.4–5)
ALBUMIN/GLOB SERPL: 1.1 (ref 0.8–1.7)
ALP SERPL-CCNC: 56 U/L (ref 45–117)
ALT SERPL-CCNC: 26 U/L (ref 16–61)
ANION GAP SERPL CALC-SCNC: 10 MMOL/L (ref 3–18)
APPEARANCE UR: CLEAR
AST SERPL-CCNC: 24 U/L (ref 10–38)
BASOPHILS # BLD: 0 K/UL (ref 0–0.1)
BASOPHILS NFR BLD: 0 % (ref 0–2)
BILIRUB SERPL-MCNC: 0.9 MG/DL (ref 0.2–1)
BILIRUB UR QL: NEGATIVE
BUN SERPL-MCNC: 30 MG/DL (ref 7–18)
BUN/CREAT SERPL: 22 (ref 12–20)
CALCIUM SERPL-MCNC: 8.6 MG/DL (ref 8.5–10.1)
CHLORIDE SERPL-SCNC: 101 MMOL/L (ref 100–111)
CHOLEST SERPL-MCNC: 181 MG/DL
CO2 SERPL-SCNC: 24 MMOL/L (ref 21–32)
COLOR UR: YELLOW
CREAT SERPL-MCNC: 1.37 MG/DL (ref 0.6–1.3)
DIFFERENTIAL METHOD BLD: ABNORMAL
EKG ATRIAL RATE: 85 BPM
EKG DIAGNOSIS: NORMAL
EKG P AXIS: 54 DEGREES
EKG P-R INTERVAL: 164 MS
EKG Q-T INTERVAL: 380 MS
EKG QRS DURATION: 96 MS
EKG QTC CALCULATION (BAZETT): 452 MS
EKG R AXIS: -40 DEGREES
EKG T AXIS: 69 DEGREES
EKG VENTRICULAR RATE: 85 BPM
EOSINOPHIL # BLD: 0 K/UL (ref 0–0.4)
EOSINOPHIL NFR BLD: 0 % (ref 0–5)
EPITH CASTS URNS QL MICRO: NORMAL /LPF (ref 0–5)
ERYTHROCYTE [DISTWIDTH] IN BLOOD BY AUTOMATED COUNT: 14.6 % (ref 11.6–14.5)
ETHANOL SERPL-MCNC: 5 MG/DL (ref 0–3)
GLOBULIN SER CALC-MCNC: 3.7 G/DL (ref 2–4)
GLUCOSE SERPL-MCNC: 214 MG/DL (ref 74–99)
GLUCOSE UR STRIP.AUTO-MCNC: NEGATIVE MG/DL
GRAN CASTS URNS QL MICRO: NORMAL /LPF
HCT VFR BLD AUTO: 42 % (ref 36–48)
HDLC SERPL-MCNC: 71 MG/DL (ref 40–60)
HDLC SERPL: 2.5 (ref 0–5)
HGB BLD-MCNC: 13.7 G/DL (ref 13–16)
HGB UR QL STRIP: NEGATIVE
IMM GRANULOCYTES # BLD AUTO: 0.1 K/UL (ref 0–0.04)
IMM GRANULOCYTES NFR BLD AUTO: 1 % (ref 0–0.5)
KETONES UR QL STRIP.AUTO: 15 MG/DL
LDLC SERPL CALC-MCNC: 86.4 MG/DL (ref 0–100)
LEUKOCYTE ESTERASE UR QL STRIP.AUTO: NEGATIVE
LIPASE SERPL-CCNC: ABNORMAL U/L (ref 73–393)
LIPID PANEL: ABNORMAL
LYMPHOCYTES # BLD: 1.2 K/UL (ref 0.9–3.6)
LYMPHOCYTES NFR BLD: 12 % (ref 21–52)
MCH RBC QN AUTO: 27.5 PG (ref 24–34)
MCHC RBC AUTO-ENTMCNC: 32.6 G/DL (ref 31–37)
MCV RBC AUTO: 84.3 FL (ref 78–100)
MONOCYTES # BLD: 0.8 K/UL (ref 0.05–1.2)
MONOCYTES NFR BLD: 8 % (ref 3–10)
NEUTS SEG # BLD: 7.9 K/UL (ref 1.8–8)
NEUTS SEG NFR BLD: 79 % (ref 40–73)
NITRITE UR QL STRIP.AUTO: NEGATIVE
NRBC # BLD: 0 K/UL (ref 0–0.01)
NRBC BLD-RTO: 0 PER 100 WBC
PH UR STRIP: 5 (ref 5–8)
PLATELET # BLD AUTO: 274 K/UL (ref 135–420)
PMV BLD AUTO: 8.9 FL (ref 9.2–11.8)
POTASSIUM SERPL-SCNC: 3.1 MMOL/L (ref 3.5–5.5)
PROT SERPL-MCNC: 7.6 G/DL (ref 6.4–8.2)
PROT UR STRIP-MCNC: ABNORMAL MG/DL
RBC # BLD AUTO: 4.98 M/UL (ref 4.35–5.65)
RBC #/AREA URNS HPF: NORMAL /HPF (ref 0–5)
SODIUM SERPL-SCNC: 135 MMOL/L (ref 136–145)
SP GR UR REFRACTOMETRY: >1.03 (ref 1–1.03)
TRIGL SERPL-MCNC: 118 MG/DL
TROPONIN I SERPL HS-MCNC: 5 NG/L (ref 0–78)
UROBILINOGEN UR QL STRIP.AUTO: 1 EU/DL (ref 0.2–1)
VLDLC SERPL CALC-MCNC: 23.6 MG/DL
WBC # BLD AUTO: 10 K/UL (ref 4.6–13.2)
WBC URNS QL MICRO: NORMAL /HPF (ref 0–4)

## 2023-03-02 PROCEDURE — 84484 ASSAY OF TROPONIN QUANT: CPT

## 2023-03-02 PROCEDURE — 80061 LIPID PANEL: CPT

## 2023-03-02 PROCEDURE — 96374 THER/PROPH/DIAG INJ IV PUSH: CPT | Performed by: EMERGENCY MEDICINE

## 2023-03-02 PROCEDURE — 99285 EMERGENCY DEPT VISIT HI MDM: CPT | Performed by: EMERGENCY MEDICINE

## 2023-03-02 PROCEDURE — 6360000002 HC RX W HCPCS: Performed by: EMERGENCY MEDICINE

## 2023-03-02 PROCEDURE — 80053 COMPREHEN METABOLIC PANEL: CPT

## 2023-03-02 PROCEDURE — 2580000003 HC RX 258: Performed by: EMERGENCY MEDICINE

## 2023-03-02 PROCEDURE — 71045 X-RAY EXAM CHEST 1 VIEW: CPT

## 2023-03-02 PROCEDURE — 2500000003 HC RX 250 WO HCPCS: Performed by: EMERGENCY MEDICINE

## 2023-03-02 PROCEDURE — 96375 TX/PRO/DX INJ NEW DRUG ADDON: CPT | Performed by: EMERGENCY MEDICINE

## 2023-03-02 PROCEDURE — 70450 CT HEAD/BRAIN W/O DYE: CPT

## 2023-03-02 PROCEDURE — 1100000003 HC PRIVATE W/ TELEMETRY

## 2023-03-02 PROCEDURE — 81001 URINALYSIS AUTO W/SCOPE: CPT

## 2023-03-02 PROCEDURE — 99223 1ST HOSP IP/OBS HIGH 75: CPT | Performed by: STUDENT IN AN ORGANIZED HEALTH CARE EDUCATION/TRAINING PROGRAM

## 2023-03-02 PROCEDURE — 6360000004 HC RX CONTRAST MEDICATION: Performed by: EMERGENCY MEDICINE

## 2023-03-02 PROCEDURE — 2580000003 HC RX 258: Performed by: STUDENT IN AN ORGANIZED HEALTH CARE EDUCATION/TRAINING PROGRAM

## 2023-03-02 PROCEDURE — 6360000002 HC RX W HCPCS: Performed by: STUDENT IN AN ORGANIZED HEALTH CARE EDUCATION/TRAINING PROGRAM

## 2023-03-02 PROCEDURE — 82077 ASSAY SPEC XCP UR&BREATH IA: CPT

## 2023-03-02 PROCEDURE — 6370000000 HC RX 637 (ALT 250 FOR IP): Performed by: EMERGENCY MEDICINE

## 2023-03-02 PROCEDURE — 85025 COMPLETE CBC W/AUTO DIFF WBC: CPT

## 2023-03-02 PROCEDURE — 96376 TX/PRO/DX INJ SAME DRUG ADON: CPT | Performed by: EMERGENCY MEDICINE

## 2023-03-02 PROCEDURE — 74177 CT ABD & PELVIS W/CONTRAST: CPT

## 2023-03-02 PROCEDURE — 94761 N-INVAS EAR/PLS OXIMETRY MLT: CPT

## 2023-03-02 PROCEDURE — 93005 ELECTROCARDIOGRAM TRACING: CPT | Performed by: EMERGENCY MEDICINE

## 2023-03-02 PROCEDURE — 83690 ASSAY OF LIPASE: CPT

## 2023-03-02 RX ORDER — ONDANSETRON 2 MG/ML
4 INJECTION INTRAMUSCULAR; INTRAVENOUS ONCE
Status: COMPLETED | OUTPATIENT
Start: 2023-03-02 | End: 2023-03-02

## 2023-03-02 RX ORDER — POTASSIUM CHLORIDE 7.45 MG/ML
10 INJECTION INTRAVENOUS
Status: COMPLETED | OUTPATIENT
Start: 2023-03-02 | End: 2023-03-03

## 2023-03-02 RX ORDER — LABETALOL HYDROCHLORIDE 5 MG/ML
10 INJECTION, SOLUTION INTRAVENOUS
Status: COMPLETED | OUTPATIENT
Start: 2023-03-02 | End: 2023-03-02

## 2023-03-02 RX ORDER — SODIUM CHLORIDE, SODIUM LACTATE, POTASSIUM CHLORIDE, AND CALCIUM CHLORIDE .6; .31; .03; .02 G/100ML; G/100ML; G/100ML; G/100ML
1000 INJECTION, SOLUTION INTRAVENOUS ONCE
Status: COMPLETED | OUTPATIENT
Start: 2023-03-02 | End: 2023-03-02

## 2023-03-02 RX ORDER — 0.9 % SODIUM CHLORIDE 0.9 %
1000 INTRAVENOUS SOLUTION INTRAVENOUS ONCE
Status: COMPLETED | OUTPATIENT
Start: 2023-03-02 | End: 2023-03-02

## 2023-03-02 RX ORDER — MAGNESIUM HYDROXIDE/ALUMINUM HYDROXICE/SIMETHICONE 120; 1200; 1200 MG/30ML; MG/30ML; MG/30ML
30 SUSPENSION ORAL EVERY 6 HOURS PRN
Status: DISCONTINUED | OUTPATIENT
Start: 2023-03-02 | End: 2023-03-22 | Stop reason: HOSPADM

## 2023-03-02 RX ORDER — PANTOPRAZOLE SODIUM 40 MG/10ML
40 INJECTION, POWDER, LYOPHILIZED, FOR SOLUTION INTRAVENOUS DAILY
Status: DISCONTINUED | OUTPATIENT
Start: 2023-03-03 | End: 2023-03-21

## 2023-03-02 RX ORDER — ACETAMINOPHEN 325 MG/1
650 TABLET ORAL EVERY 6 HOURS PRN
Status: DISCONTINUED | OUTPATIENT
Start: 2023-03-02 | End: 2023-03-22 | Stop reason: HOSPADM

## 2023-03-02 RX ORDER — POLYETHYLENE GLYCOL 3350 17 G/17G
17 POWDER, FOR SOLUTION ORAL DAILY PRN
Status: DISCONTINUED | OUTPATIENT
Start: 2023-03-02 | End: 2023-03-22 | Stop reason: HOSPADM

## 2023-03-02 RX ORDER — ENOXAPARIN SODIUM 100 MG/ML
40 INJECTION SUBCUTANEOUS DAILY
Status: DISCONTINUED | OUTPATIENT
Start: 2023-03-03 | End: 2023-03-03

## 2023-03-02 RX ORDER — SODIUM CHLORIDE 9 MG/ML
INJECTION, SOLUTION INTRAVENOUS CONTINUOUS
Status: DISPENSED | OUTPATIENT
Start: 2023-03-02 | End: 2023-03-02

## 2023-03-02 RX ORDER — SODIUM CHLORIDE 9 MG/ML
INJECTION, SOLUTION INTRAVENOUS CONTINUOUS
Status: DISPENSED | OUTPATIENT
Start: 2023-03-02 | End: 2023-03-03

## 2023-03-02 RX ORDER — MORPHINE SULFATE 2 MG/ML
2 INJECTION, SOLUTION INTRAMUSCULAR; INTRAVENOUS EVERY 4 HOURS PRN
Status: DISCONTINUED | OUTPATIENT
Start: 2023-03-02 | End: 2023-03-03

## 2023-03-02 RX ORDER — SODIUM CHLORIDE 0.9 % (FLUSH) 0.9 %
5-40 SYRINGE (ML) INJECTION EVERY 12 HOURS SCHEDULED
Status: DISCONTINUED | OUTPATIENT
Start: 2023-03-02 | End: 2023-03-14

## 2023-03-02 RX ORDER — POTASSIUM CHLORIDE 20 MEQ/1
40 TABLET, EXTENDED RELEASE ORAL
Status: COMPLETED | OUTPATIENT
Start: 2023-03-02 | End: 2023-03-02

## 2023-03-02 RX ORDER — HYDROMORPHONE HYDROCHLORIDE 1 MG/ML
1 INJECTION, SOLUTION INTRAMUSCULAR; INTRAVENOUS; SUBCUTANEOUS
Status: COMPLETED | OUTPATIENT
Start: 2023-03-02 | End: 2023-03-02

## 2023-03-02 RX ORDER — ONDANSETRON 2 MG/ML
4 INJECTION INTRAMUSCULAR; INTRAVENOUS
Status: DISCONTINUED | OUTPATIENT
Start: 2023-03-02 | End: 2023-03-03

## 2023-03-02 RX ORDER — ONDANSETRON 2 MG/ML
4 INJECTION INTRAMUSCULAR; INTRAVENOUS EVERY 8 HOURS PRN
Status: DISCONTINUED | OUTPATIENT
Start: 2023-03-02 | End: 2023-03-03

## 2023-03-02 RX ORDER — HYDRALAZINE HYDROCHLORIDE 20 MG/ML
10 INJECTION INTRAMUSCULAR; INTRAVENOUS ONCE
Status: COMPLETED | OUTPATIENT
Start: 2023-03-02 | End: 2023-03-02

## 2023-03-02 RX ORDER — INSULIN LISPRO 100 [IU]/ML
0-4 INJECTION, SOLUTION INTRAVENOUS; SUBCUTANEOUS NIGHTLY
Status: DISCONTINUED | OUTPATIENT
Start: 2023-03-02 | End: 2023-03-05

## 2023-03-02 RX ORDER — DEXTROSE MONOHYDRATE 100 MG/ML
INJECTION, SOLUTION INTRAVENOUS CONTINUOUS PRN
Status: DISCONTINUED | OUTPATIENT
Start: 2023-03-02 | End: 2023-03-22 | Stop reason: HOSPADM

## 2023-03-02 RX ORDER — SODIUM CHLORIDE 9 MG/ML
INJECTION, SOLUTION INTRAVENOUS PRN
Status: DISCONTINUED | OUTPATIENT
Start: 2023-03-02 | End: 2023-03-04

## 2023-03-02 RX ORDER — SODIUM CHLORIDE 0.9 % (FLUSH) 0.9 %
5-40 SYRINGE (ML) INJECTION PRN
Status: DISCONTINUED | OUTPATIENT
Start: 2023-03-02 | End: 2023-03-22 | Stop reason: HOSPADM

## 2023-03-02 RX ORDER — FAMOTIDINE 20 MG/1
20 TABLET, FILM COATED ORAL 2 TIMES DAILY
Status: DISCONTINUED | OUTPATIENT
Start: 2023-03-02 | End: 2023-03-02

## 2023-03-02 RX ORDER — INSULIN LISPRO 100 [IU]/ML
0-8 INJECTION, SOLUTION INTRAVENOUS; SUBCUTANEOUS
Status: DISCONTINUED | OUTPATIENT
Start: 2023-03-03 | End: 2023-03-05

## 2023-03-02 RX ADMIN — HYDROMORPHONE HYDROCHLORIDE 0.5 MG: 1 INJECTION, SOLUTION INTRAMUSCULAR; INTRAVENOUS; SUBCUTANEOUS at 14:19

## 2023-03-02 RX ADMIN — POTASSIUM CHLORIDE 10 MEQ: 7.46 INJECTION, SOLUTION INTRAVENOUS at 21:55

## 2023-03-02 RX ADMIN — ONDANSETRON 4 MG: 2 INJECTION INTRAMUSCULAR; INTRAVENOUS at 23:36

## 2023-03-02 RX ADMIN — SODIUM CHLORIDE: 900 INJECTION, SOLUTION INTRAVENOUS at 23:35

## 2023-03-02 RX ADMIN — HYDROMORPHONE HYDROCHLORIDE 0.5 MG: 1 INJECTION, SOLUTION INTRAMUSCULAR; INTRAVENOUS; SUBCUTANEOUS at 18:13

## 2023-03-02 RX ADMIN — POTASSIUM CHLORIDE 40 MEQ: 1500 TABLET, EXTENDED RELEASE ORAL at 14:49

## 2023-03-02 RX ADMIN — POTASSIUM CHLORIDE 10 MEQ: 7.46 INJECTION, SOLUTION INTRAVENOUS at 22:46

## 2023-03-02 RX ADMIN — ONDANSETRON 4 MG: 2 INJECTION INTRAMUSCULAR; INTRAVENOUS at 13:31

## 2023-03-02 RX ADMIN — SODIUM CHLORIDE, POTASSIUM CHLORIDE, SODIUM LACTATE AND CALCIUM CHLORIDE 1000 ML: 600; 310; 30; 20 INJECTION, SOLUTION INTRAVENOUS at 13:31

## 2023-03-02 RX ADMIN — POTASSIUM CHLORIDE 10 MEQ: 7.46 INJECTION, SOLUTION INTRAVENOUS at 23:34

## 2023-03-02 RX ADMIN — SODIUM CHLORIDE 1000 ML: 900 INJECTION, SOLUTION INTRAVENOUS at 16:46

## 2023-03-02 RX ADMIN — SODIUM CHLORIDE, PRESERVATIVE FREE 10 ML: 5 INJECTION INTRAVENOUS at 23:36

## 2023-03-02 RX ADMIN — HYDRALAZINE HYDROCHLORIDE 10 MG: 20 INJECTION INTRAMUSCULAR; INTRAVENOUS at 23:36

## 2023-03-02 RX ADMIN — HYDROMORPHONE HYDROCHLORIDE 0.5 MG: 1 INJECTION, SOLUTION INTRAMUSCULAR; INTRAVENOUS; SUBCUTANEOUS at 20:51

## 2023-03-02 RX ADMIN — HYDROMORPHONE HYDROCHLORIDE 1 MG: 1 INJECTION, SOLUTION INTRAMUSCULAR; INTRAVENOUS; SUBCUTANEOUS at 16:00

## 2023-03-02 RX ADMIN — LABETALOL HYDROCHLORIDE 10 MG: 5 INJECTION, SOLUTION INTRAVENOUS at 16:47

## 2023-03-02 RX ADMIN — IOPAMIDOL 80 ML: 612 INJECTION, SOLUTION INTRAVENOUS at 15:19

## 2023-03-02 RX ADMIN — HYDROMORPHONE HYDROCHLORIDE 0.5 MG: 1 INJECTION, SOLUTION INTRAMUSCULAR; INTRAVENOUS; SUBCUTANEOUS at 13:31

## 2023-03-02 RX ADMIN — HYDROMORPHONE HYDROCHLORIDE 0.5 MG: 1 INJECTION, SOLUTION INTRAMUSCULAR; INTRAVENOUS; SUBCUTANEOUS at 23:34

## 2023-03-02 ASSESSMENT — PAIN SCALES - GENERAL
PAINLEVEL_OUTOF10: 8
PAINLEVEL_OUTOF10: 9
PAINLEVEL_OUTOF10: 6
PAINLEVEL_OUTOF10: 9
PAINLEVEL_OUTOF10: 8
PAINLEVEL_OUTOF10: 8

## 2023-03-02 ASSESSMENT — ENCOUNTER SYMPTOMS
SORE THROAT: 0
COUGH: 0
BACK PAIN: 0
VOMITING: 1
ABDOMINAL PAIN: 1
NAUSEA: 1
RHINORRHEA: 0
SHORTNESS OF BREATH: 0
DIARRHEA: 0

## 2023-03-02 ASSESSMENT — PAIN - FUNCTIONAL ASSESSMENT: PAIN_FUNCTIONAL_ASSESSMENT: 0-10

## 2023-03-02 ASSESSMENT — PAIN DESCRIPTION - ORIENTATION: ORIENTATION: MID

## 2023-03-02 ASSESSMENT — PAIN DESCRIPTION - LOCATION
LOCATION: ABDOMEN
LOCATION: ABDOMEN

## 2023-03-02 NOTE — ED TRIAGE NOTES
Pt came home from work overnight after working out. Pt was laying down and started with 10/10 abdominal ain and vomited. Pt states he tripped an fell this morning as well. Denies SOB/chest pain. A/O x 4. Wife at bedside.

## 2023-03-02 NOTE — ED PROVIDER NOTES
medical history, past surgical history, family history and social history. - Initial assessment performed. The patients presenting problems have been discussed, and the staff are in agreement with the care plan formulated and outlined with them. I have encouraged them to ask questions as they arise throughout their visit. Vital Signs-Reviewed the patient's vital signs. Vitals:    03/03/23 2316 03/03/23 2336 03/04/23 0340 03/04/23 0746   BP: (!) 145/60  (!) 143/67 (!) 152/66   Pulse: (!) 112  (!) 108 (!) 105   Resp: (!) 37  (!) 41 (!) 38   Temp: 98.2 °F (36.8 °C)  99.1 °F (37.3 °C) 100.2 °F (37.9 °C)   TempSrc:   Oral Oral   SpO2: 90% 93% 94% 93%   Weight:       Height:                 Records Reviewed:   Nursing Notes, available previous labs, imaging and medical records have been reviewed (if available). Additional Considerations:    N/A    Provider Notes (Medical Decision Making):     Michelle Wayne is a 75 y.o. male with noted past medical history,who presented to the Emergency department with a chief complaint of Abdominal Pain              MDM  Number of Diagnoses or Management Options  Diagnosis management comments:   Differential diagnosis includes gastroenteritis, gastritis, peptic ulcer disease, pancreatitis, small bowel obstruction, appendicitis, diverticulitis, marijuana hyperemesis, constipation, stool impaction, etc    Has some evidence of head trauma somewhat get a CT scan to rule out intracranial injury. We will check troponin to assess for ACS. EKG shows a nonspecific changes with a left axis deviation           ED Course:         ED Course as of 03/04/23 0823   Thu Mar 02, 2023   1337 Interpreted by Consuelo Zamora DO -- XR Chest: Lung spaces are clear, no focal consolidation or Pneumothorax. [CRYSTAL]   1440 Labs reflect elevated lipase, low sodium, low potassium. We will replace the potassium.   Unclear cause for the pancreatitis he states he drinks minimally, per day although certainly

## 2023-03-03 ENCOUNTER — APPOINTMENT (OUTPATIENT)
Facility: HOSPITAL | Age: 68
DRG: 438 | End: 2023-03-03
Payer: MEDICARE

## 2023-03-03 PROBLEM — E87.5 HYPERKALEMIA: Status: ACTIVE | Noted: 2023-03-03

## 2023-03-03 PROBLEM — F10.29 ALCOHOL DEPENDENCE WITH UNSPECIFIED ALCOHOL-INDUCED DISORDER (HCC): Status: ACTIVE | Noted: 2023-03-03

## 2023-03-03 PROBLEM — K85.20 ALCOHOL-INDUCED ACUTE PANCREATITIS WITHOUT INFECTION OR NECROSIS: Status: ACTIVE | Noted: 2023-03-03

## 2023-03-03 PROBLEM — K56.7 ILEUS (HCC): Status: ACTIVE | Noted: 2023-03-03

## 2023-03-03 PROBLEM — E87.20 LACTIC ACIDOSIS: Status: ACTIVE | Noted: 2023-03-03

## 2023-03-03 PROBLEM — N17.9 AKI (ACUTE KIDNEY INJURY) (HCC): Status: ACTIVE | Noted: 2023-03-03

## 2023-03-03 PROBLEM — R65.10 SIRS (SYSTEMIC INFLAMMATORY RESPONSE SYNDROME) (HCC): Status: ACTIVE | Noted: 2023-03-03

## 2023-03-03 LAB
ALBUMIN SERPL-MCNC: 2.9 G/DL (ref 3.4–5)
ALBUMIN SERPL-MCNC: 3.6 G/DL (ref 3.4–5)
ALBUMIN/GLOB SERPL: 0.9 (ref 0.8–1.7)
ALBUMIN/GLOB SERPL: 1.2 (ref 0.8–1.7)
ALP SERPL-CCNC: 43 U/L (ref 45–117)
ALP SERPL-CCNC: 52 U/L (ref 45–117)
ALT SERPL-CCNC: 24 U/L (ref 16–61)
ALT SERPL-CCNC: 25 U/L (ref 16–61)
ANION GAP SERPL CALC-SCNC: 4 MMOL/L (ref 3–18)
ANION GAP SERPL CALC-SCNC: 6 MMOL/L (ref 3–18)
ANION GAP SERPL CALC-SCNC: 8 MMOL/L (ref 3–18)
ARTERIAL PATENCY WRIST A: ABNORMAL
AST SERPL-CCNC: 26 U/L (ref 10–38)
AST SERPL-CCNC: 68 U/L (ref 10–38)
BASE DEFICIT BLD-SCNC: 5.1 MMOL/L
BASOPHILS # BLD: 0 K/UL (ref 0–0.1)
BASOPHILS NFR BLD: 0 % (ref 0–2)
BDY SITE: ABNORMAL
BILIRUB SERPL-MCNC: 1.2 MG/DL (ref 0.2–1)
BILIRUB SERPL-MCNC: 1.5 MG/DL (ref 0.2–1)
BUN SERPL-MCNC: 36 MG/DL (ref 7–18)
BUN SERPL-MCNC: 47 MG/DL (ref 7–18)
BUN SERPL-MCNC: 50 MG/DL (ref 7–18)
BUN/CREAT SERPL: 24 (ref 12–20)
BUN/CREAT SERPL: 25 (ref 12–20)
BUN/CREAT SERPL: 30 (ref 12–20)
CA-I SERPL-SCNC: 1.05 MMOL/L (ref 1.15–1.33)
CALCIUM SERPL-MCNC: 8 MG/DL (ref 8.5–10.1)
CALCIUM SERPL-MCNC: 8.1 MG/DL (ref 8.5–10.1)
CALCIUM SERPL-MCNC: 8.5 MG/DL (ref 8.5–10.1)
CHLORIDE SERPL-SCNC: 103 MMOL/L (ref 100–111)
CHLORIDE SERPL-SCNC: 105 MMOL/L (ref 100–111)
CHLORIDE SERPL-SCNC: 107 MMOL/L (ref 100–111)
CO2 SERPL-SCNC: 19 MMOL/L (ref 21–32)
CO2 SERPL-SCNC: 22 MMOL/L (ref 21–32)
CO2 SERPL-SCNC: 24 MMOL/L (ref 21–32)
CREAT SERPL-MCNC: 1.53 MG/DL (ref 0.6–1.3)
CREAT SERPL-MCNC: 1.56 MG/DL (ref 0.6–1.3)
CREAT SERPL-MCNC: 2.04 MG/DL (ref 0.6–1.3)
DIFFERENTIAL METHOD BLD: ABNORMAL
EOSINOPHIL # BLD: 0 K/UL (ref 0–0.4)
EOSINOPHIL NFR BLD: 0 % (ref 0–5)
ERYTHROCYTE [DISTWIDTH] IN BLOOD BY AUTOMATED COUNT: 14.7 % (ref 11.6–14.5)
EST. AVERAGE GLUCOSE BLD GHB EST-MCNC: 126 MG/DL
GLOBULIN SER CALC-MCNC: 3 G/DL (ref 2–4)
GLOBULIN SER CALC-MCNC: 3.3 G/DL (ref 2–4)
GLUCOSE BLD STRIP.AUTO-MCNC: 195 MG/DL (ref 70–110)
GLUCOSE BLD STRIP.AUTO-MCNC: 201 MG/DL (ref 70–110)
GLUCOSE BLD STRIP.AUTO-MCNC: 310 MG/DL (ref 70–110)
GLUCOSE BLD STRIP.AUTO-MCNC: 312 MG/DL (ref 70–110)
GLUCOSE BLD STRIP.AUTO-MCNC: 313 MG/DL (ref 70–110)
GLUCOSE BLD STRIP.AUTO-MCNC: 324 MG/DL (ref 70–110)
GLUCOSE SERPL-MCNC: 231 MG/DL (ref 74–99)
GLUCOSE SERPL-MCNC: 282 MG/DL (ref 74–99)
GLUCOSE SERPL-MCNC: 412 MG/DL (ref 74–99)
HBA1C MFR BLD: 6 % (ref 4.2–5.6)
HCO3 BLD-SCNC: 18 MMOL/L (ref 22–26)
HCT VFR BLD AUTO: 45 % (ref 36–48)
HGB BLD-MCNC: 14.7 G/DL (ref 13–16)
IMM GRANULOCYTES # BLD AUTO: 0 K/UL (ref 0–0.04)
IMM GRANULOCYTES NFR BLD AUTO: 0 % (ref 0–0.5)
LACTATE BLD-SCNC: 4.44 MMOL/L (ref 0.4–2)
LACTATE SERPL-SCNC: 19 MMOL/L (ref 0.4–2)
LYMPHOCYTES # BLD: 0.5 K/UL (ref 0.9–3.6)
LYMPHOCYTES NFR BLD: 5 % (ref 21–52)
MAGNESIUM SERPL-MCNC: 2 MG/DL (ref 1.6–2.6)
MCH RBC QN AUTO: 27.4 PG (ref 24–34)
MCHC RBC AUTO-ENTMCNC: 32.7 G/DL (ref 31–37)
MCV RBC AUTO: 84 FL (ref 78–100)
MONOCYTES # BLD: 0.4 K/UL (ref 0.05–1.2)
MONOCYTES NFR BLD: 4 % (ref 3–10)
NEUTS BAND NFR BLD MANUAL: 14 %
NEUTS SEG # BLD: 8.4 K/UL (ref 1.8–8)
NEUTS SEG NFR BLD: 77 % (ref 40–73)
NRBC # BLD: 0 K/UL (ref 0–0.01)
NRBC BLD-RTO: 0 PER 100 WBC
O2/TOTAL GAS SETTING VFR VENT: 21 %
PCO2 BLD: 27 MMHG (ref 35–45)
PH BLD: 7.43 (ref 7.35–7.45)
PHOSPHATE SERPL-MCNC: 1.6 MG/DL (ref 2.5–4.9)
PLATELET # BLD AUTO: 250 K/UL (ref 135–420)
PLATELET COMMENT: ABNORMAL
PMV BLD AUTO: 8.9 FL (ref 9.2–11.8)
PO2 BLD: 73 MMHG (ref 80–100)
POTASSIUM SERPL-SCNC: 4.5 MMOL/L (ref 3.5–5.5)
POTASSIUM SERPL-SCNC: 5.6 MMOL/L (ref 3.5–5.5)
POTASSIUM SERPL-SCNC: 6.2 MMOL/L (ref 3.5–5.5)
PROT SERPL-MCNC: 6.2 G/DL (ref 6.4–8.2)
PROT SERPL-MCNC: 6.6 G/DL (ref 6.4–8.2)
RBC # BLD AUTO: 5.36 M/UL (ref 4.35–5.65)
RBC MORPH BLD: ABNORMAL
SAO2 % BLD: 95.3 % (ref 92–97)
SERVICE CMNT-IMP: ABNORMAL
SODIUM SERPL-SCNC: 130 MMOL/L (ref 136–145)
SODIUM SERPL-SCNC: 133 MMOL/L (ref 136–145)
SODIUM SERPL-SCNC: 135 MMOL/L (ref 136–145)
SPECIMEN TYPE: ABNORMAL
TROPONIN I SERPL HS-MCNC: 34 NG/L (ref 0–78)
WBC # BLD AUTO: 9.3 K/UL (ref 4.6–13.2)

## 2023-03-03 PROCEDURE — 6370000000 HC RX 637 (ALT 250 FOR IP): Performed by: STUDENT IN AN ORGANIZED HEALTH CARE EDUCATION/TRAINING PROGRAM

## 2023-03-03 PROCEDURE — 36600 WITHDRAWAL OF ARTERIAL BLOOD: CPT

## 2023-03-03 PROCEDURE — 82330 ASSAY OF CALCIUM: CPT

## 2023-03-03 PROCEDURE — 36415 COLL VENOUS BLD VENIPUNCTURE: CPT

## 2023-03-03 PROCEDURE — APPSS15 APP SPLIT SHARED TIME 0-15 MINUTES

## 2023-03-03 PROCEDURE — 99292 CRITICAL CARE ADDL 30 MIN: CPT | Performed by: INTERNAL MEDICINE

## 2023-03-03 PROCEDURE — 80053 COMPREHEN METABOLIC PANEL: CPT

## 2023-03-03 PROCEDURE — 94761 N-INVAS EAR/PLS OXIMETRY MLT: CPT

## 2023-03-03 PROCEDURE — 0D9670Z DRAINAGE OF STOMACH WITH DRAINAGE DEVICE, VIA NATURAL OR ARTIFICIAL OPENING: ICD-10-PCS | Performed by: INTERNAL MEDICINE

## 2023-03-03 PROCEDURE — 6360000002 HC RX W HCPCS: Performed by: STUDENT IN AN ORGANIZED HEALTH CARE EDUCATION/TRAINING PROGRAM

## 2023-03-03 PROCEDURE — 2580000003 HC RX 258: Performed by: STUDENT IN AN ORGANIZED HEALTH CARE EDUCATION/TRAINING PROGRAM

## 2023-03-03 PROCEDURE — 2500000003 HC RX 250 WO HCPCS: Performed by: INTERNAL MEDICINE

## 2023-03-03 PROCEDURE — 82010 KETONE BODYS QUAN: CPT

## 2023-03-03 PROCEDURE — 82962 GLUCOSE BLOOD TEST: CPT

## 2023-03-03 PROCEDURE — 74018 RADEX ABDOMEN 1 VIEW: CPT

## 2023-03-03 PROCEDURE — 83036 HEMOGLOBIN GLYCOSYLATED A1C: CPT

## 2023-03-03 PROCEDURE — 99291 CRITICAL CARE FIRST HOUR: CPT | Performed by: INTERNAL MEDICINE

## 2023-03-03 PROCEDURE — 85025 COMPLETE CBC W/AUTO DIFF WBC: CPT

## 2023-03-03 PROCEDURE — 84484 ASSAY OF TROPONIN QUANT: CPT

## 2023-03-03 PROCEDURE — 6370000000 HC RX 637 (ALT 250 FOR IP): Performed by: INTERNAL MEDICINE

## 2023-03-03 PROCEDURE — 83605 ASSAY OF LACTIC ACID: CPT

## 2023-03-03 PROCEDURE — 84100 ASSAY OF PHOSPHORUS: CPT

## 2023-03-03 PROCEDURE — 83735 ASSAY OF MAGNESIUM: CPT

## 2023-03-03 PROCEDURE — 6360000002 HC RX W HCPCS: Performed by: INTERNAL MEDICINE

## 2023-03-03 PROCEDURE — 2580000003 HC RX 258: Performed by: INTERNAL MEDICINE

## 2023-03-03 PROCEDURE — 82803 BLOOD GASES ANY COMBINATION: CPT

## 2023-03-03 PROCEDURE — 1100000000 HC RM PRIVATE

## 2023-03-03 PROCEDURE — 99233 SBSQ HOSP IP/OBS HIGH 50: CPT | Performed by: INTERNAL MEDICINE

## 2023-03-03 PROCEDURE — 93005 ELECTROCARDIOGRAM TRACING: CPT | Performed by: INTERNAL MEDICINE

## 2023-03-03 PROCEDURE — 51702 INSERT TEMP BLADDER CATH: CPT

## 2023-03-03 PROCEDURE — C9113 INJ PANTOPRAZOLE SODIUM, VIA: HCPCS | Performed by: STUDENT IN AN ORGANIZED HEALTH CARE EDUCATION/TRAINING PROGRAM

## 2023-03-03 RX ORDER — ONDANSETRON 2 MG/ML
4 INJECTION INTRAMUSCULAR; INTRAVENOUS EVERY 6 HOURS PRN
Status: DISCONTINUED | OUTPATIENT
Start: 2023-03-03 | End: 2023-03-22 | Stop reason: HOSPADM

## 2023-03-03 RX ORDER — MORPHINE SULFATE 2 MG/ML
2 INJECTION, SOLUTION INTRAMUSCULAR; INTRAVENOUS
Status: DISCONTINUED | OUTPATIENT
Start: 2023-03-03 | End: 2023-03-08

## 2023-03-03 RX ORDER — SODIUM CHLORIDE 9 MG/ML
INJECTION, SOLUTION INTRAVENOUS CONTINUOUS
Status: DISCONTINUED | OUTPATIENT
Start: 2023-03-03 | End: 2023-03-03

## 2023-03-03 RX ORDER — INSULIN GLARGINE 100 [IU]/ML
5 INJECTION, SOLUTION SUBCUTANEOUS NIGHTLY
Status: DISCONTINUED | OUTPATIENT
Start: 2023-03-03 | End: 2023-03-05

## 2023-03-03 RX ORDER — SODIUM POLYSTYRENE SULFONATE 15 G/60ML
30 SUSPENSION ORAL; RECTAL ONCE
Status: COMPLETED | OUTPATIENT
Start: 2023-03-03 | End: 2023-03-03

## 2023-03-03 RX ORDER — SODIUM CHLORIDE, SODIUM LACTATE, POTASSIUM CHLORIDE, CALCIUM CHLORIDE 600; 310; 30; 20 MG/100ML; MG/100ML; MG/100ML; MG/100ML
INJECTION, SOLUTION INTRAVENOUS CONTINUOUS
Status: DISCONTINUED | OUTPATIENT
Start: 2023-03-03 | End: 2023-03-03

## 2023-03-03 RX ORDER — SODIUM CHLORIDE, SODIUM LACTATE, POTASSIUM CHLORIDE, AND CALCIUM CHLORIDE .6; .31; .03; .02 G/100ML; G/100ML; G/100ML; G/100ML
1000 INJECTION, SOLUTION INTRAVENOUS ONCE
Status: COMPLETED | OUTPATIENT
Start: 2023-03-03 | End: 2023-03-03

## 2023-03-03 RX ORDER — MORPHINE SULFATE 4 MG/ML
4 INJECTION, SOLUTION INTRAMUSCULAR; INTRAVENOUS
Status: DISCONTINUED | OUTPATIENT
Start: 2023-03-03 | End: 2023-03-08

## 2023-03-03 RX ORDER — DEXTROSE MONOHYDRATE 100 MG/ML
INJECTION, SOLUTION INTRAVENOUS CONTINUOUS PRN
Status: DISCONTINUED | OUTPATIENT
Start: 2023-03-03 | End: 2023-03-03

## 2023-03-03 RX ORDER — HEPARIN SODIUM 5000 [USP'U]/ML
5000 INJECTION, SOLUTION INTRAVENOUS; SUBCUTANEOUS EVERY 8 HOURS SCHEDULED
Status: DISCONTINUED | OUTPATIENT
Start: 2023-03-04 | End: 2023-03-22 | Stop reason: HOSPADM

## 2023-03-03 RX ADMIN — PANTOPRAZOLE SODIUM 40 MG: 40 INJECTION, POWDER, FOR SOLUTION INTRAVENOUS at 09:47

## 2023-03-03 RX ADMIN — SODIUM BICARBONATE 50 MEQ: 84 INJECTION INTRAVENOUS at 16:24

## 2023-03-03 RX ADMIN — INSULIN HUMAN 10 UNITS: 100 INJECTION, SOLUTION PARENTERAL at 16:25

## 2023-03-03 RX ADMIN — MORPHINE SULFATE 2 MG: 2 INJECTION, SOLUTION INTRAMUSCULAR; INTRAVENOUS at 13:47

## 2023-03-03 RX ADMIN — DEXTROSE MONOHYDRATE 250 ML: 100 INJECTION, SOLUTION INTRAVENOUS at 16:37

## 2023-03-03 RX ADMIN — SODIUM POLYSTYRENE SULFONATE 30 G: 15 SUSPENSION ORAL; RECTAL at 16:48

## 2023-03-03 RX ADMIN — THIAMINE HYDROCHLORIDE 400 MG: 100 INJECTION, SOLUTION INTRAMUSCULAR; INTRAVENOUS at 17:56

## 2023-03-03 RX ADMIN — ENOXAPARIN SODIUM 40 MG: 100 INJECTION SUBCUTANEOUS at 09:47

## 2023-03-03 RX ADMIN — MORPHINE SULFATE 4 MG: 4 INJECTION, SOLUTION INTRAMUSCULAR; INTRAVENOUS at 09:43

## 2023-03-03 RX ADMIN — SODIUM CHLORIDE, POTASSIUM CHLORIDE, SODIUM LACTATE AND CALCIUM CHLORIDE 1000 ML: 600; 310; 30; 20 INJECTION, SOLUTION INTRAVENOUS at 18:03

## 2023-03-03 RX ADMIN — INSULIN LISPRO 6 UNITS: 100 INJECTION, SOLUTION INTRAVENOUS; SUBCUTANEOUS at 08:23

## 2023-03-03 RX ADMIN — SODIUM BICARBONATE: 84 INJECTION, SOLUTION INTRAVENOUS at 17:56

## 2023-03-03 RX ADMIN — SODIUM CHLORIDE, PRESERVATIVE FREE 10 ML: 5 INJECTION INTRAVENOUS at 08:26

## 2023-03-03 RX ADMIN — POTASSIUM CHLORIDE 10 MEQ: 7.46 INJECTION, SOLUTION INTRAVENOUS at 00:40

## 2023-03-03 RX ADMIN — INSULIN GLARGINE 5 UNITS: 100 INJECTION, SOLUTION SUBCUTANEOUS at 21:09

## 2023-03-03 RX ADMIN — SODIUM CHLORIDE: 9 INJECTION, SOLUTION INTRAVENOUS at 14:16

## 2023-03-03 RX ADMIN — MORPHINE SULFATE 2 MG: 2 INJECTION, SOLUTION INTRAMUSCULAR; INTRAVENOUS at 06:53

## 2023-03-03 RX ADMIN — INSULIN LISPRO 6 UNITS: 100 INJECTION, SOLUTION INTRAVENOUS; SUBCUTANEOUS at 13:48

## 2023-03-03 RX ADMIN — SODIUM CHLORIDE, PRESERVATIVE FREE 10 ML: 5 INJECTION INTRAVENOUS at 21:17

## 2023-03-03 RX ADMIN — INSULIN LISPRO 2 UNITS: 100 INJECTION, SOLUTION INTRAVENOUS; SUBCUTANEOUS at 18:17

## 2023-03-03 RX ADMIN — MORPHINE SULFATE 2 MG: 2 INJECTION, SOLUTION INTRAMUSCULAR; INTRAVENOUS at 18:15

## 2023-03-03 ASSESSMENT — PAIN DESCRIPTION - ORIENTATION
ORIENTATION: MID
ORIENTATION: ANTERIOR
ORIENTATION: MID
ORIENTATION: MID
ORIENTATION: ANTERIOR

## 2023-03-03 ASSESSMENT — PAIN SCALES - GENERAL
PAINLEVEL_OUTOF10: 8
PAINLEVEL_OUTOF10: 6
PAINLEVEL_OUTOF10: 0
PAINLEVEL_OUTOF10: 8
PAINLEVEL_OUTOF10: 4
PAINLEVEL_OUTOF10: 0
PAINLEVEL_OUTOF10: 6

## 2023-03-03 ASSESSMENT — PAIN DESCRIPTION - FREQUENCY
FREQUENCY: CONTINUOUS
FREQUENCY: INTERMITTENT

## 2023-03-03 ASSESSMENT — PAIN DESCRIPTION - DESCRIPTORS
DESCRIPTORS: ACHING
DESCRIPTORS: ACHING
DESCRIPTORS: SHARP
DESCRIPTORS: ACHING
DESCRIPTORS: SHARP

## 2023-03-03 ASSESSMENT — PAIN DESCRIPTION - LOCATION
LOCATION: ABDOMEN

## 2023-03-03 ASSESSMENT — PAIN DESCRIPTION - ONSET
ONSET: GRADUAL
ONSET: ON-GOING

## 2023-03-03 ASSESSMENT — PAIN DESCRIPTION - PAIN TYPE
TYPE: ACUTE PAIN

## 2023-03-03 NOTE — H&P
History & Physical    Patient: Eyal Mendes MRN: [id number]  CSN: [id number]    YOB: 1955  Age: 75 y.o. Sex: male      DOA: 3/2/2023    Chief Complaint:   Chief Complaint   Patient presents with    Abdominal Pain          HPI:     Eyal Mendes is a 75 y.o. male with hx of RLS, severe obstructive sleep apnea, prediabetes, HTN. Patient presents from home, lives with wife. Wife updated over phone. Patient reported to [facility] ED today secondary to abdominal pain, nausea, vomiting which began this morning. Pain described as periumbilical. He was otherwise well up until this morning. Patient has been taking PPI for several months. He has started tapering off and now only takes PPI on weekend. Notices he now has significant belching. Patient became lightheaded after instance of vomiting and fell to floor. He does have abrasion on left side of head. CT head completed without evidence of bleed, trauma. CT abdomen pelvis with evidence of pancreatitis without abscess/pseudocyst.   Patient drinks 1 alcoholic beverage daily. No h/o heavy drinking.  Has not had pancreatitis in the past.     Past Medical History:   Diagnosis Date    Allergic rhinitis     GERD (gastroesophageal reflux disease)     GERD (gastroesophageal reflux disease)     Hypertension     Insomnia 02/23/2018    External report    Nocturia     Prediabetes     Restless legs syndrome (RLS) 02/23/2018    External report    Severe obstructive sleep apnea 02/23/2018    External report       Past Surgical History:   Procedure Laterality Date    APPENDECTOMY      HERNIA REPAIR      NE ABDOMEN SURGERY PROC UNLISTED      TONSILLECTOMY         Family History   Problem Relation Age of Onset    Alzheimer's Disease Mother     Lung Disease Father     COPD Father        Social History     Socioeconomic History    Marital status:    Tobacco Use    Smoking status: Former     Packs/day: 1.00     Types: Cigarettes     Quit date:

## 2023-03-03 NOTE — ED NOTES
EMS arrived to take pt to [facility]. Pt and family updated on plan of care.      Andrey Calvillo RN  03/02/23 2011

## 2023-03-04 ENCOUNTER — APPOINTMENT (OUTPATIENT)
Facility: HOSPITAL | Age: 68
DRG: 438 | End: 2023-03-04
Payer: MEDICARE

## 2023-03-04 PROBLEM — K85.90 ACUTE PANCREATITIS WITHOUT INFECTION OR NECROSIS: Status: ACTIVE | Noted: 2023-03-02

## 2023-03-04 LAB
ALBUMIN SERPL-MCNC: 2.4 G/DL (ref 3.4–5)
ALBUMIN SERPL-MCNC: 2.6 G/DL (ref 3.4–5)
ALBUMIN SERPL-MCNC: 2.6 G/DL (ref 3.4–5)
ALBUMIN/GLOB SERPL: 0.8 (ref 0.8–1.7)
ALP SERPL-CCNC: 42 U/L (ref 45–117)
ALT SERPL-CCNC: 22 U/L (ref 16–61)
ANION GAP SERPL CALC-SCNC: 3 MMOL/L (ref 3–18)
ANION GAP SERPL CALC-SCNC: 5 MMOL/L (ref 3–18)
ANION GAP SERPL CALC-SCNC: 6 MMOL/L (ref 3–18)
AST SERPL-CCNC: 71 U/L (ref 10–38)
B-OH-BUTYR SERPL-SCNC: <0.02 MMOL/L
BASOPHILS # BLD: 0 K/UL (ref 0–0.1)
BASOPHILS NFR BLD: 0 % (ref 0–2)
BILIRUB SERPL-MCNC: 1.9 MG/DL (ref 0.2–1)
BUN SERPL-MCNC: 38 MG/DL (ref 7–18)
BUN SERPL-MCNC: 40 MG/DL (ref 7–18)
BUN SERPL-MCNC: 42 MG/DL (ref 7–18)
BUN/CREAT SERPL: 27 (ref 12–20)
BUN/CREAT SERPL: 28 (ref 12–20)
BUN/CREAT SERPL: 30 (ref 12–20)
CALCIUM SERPL-MCNC: 7.1 MG/DL (ref 8.5–10.1)
CALCIUM SERPL-MCNC: 7.4 MG/DL (ref 8.5–10.1)
CALCIUM SERPL-MCNC: 7.5 MG/DL (ref 8.5–10.1)
CHLORIDE SERPL-SCNC: 101 MMOL/L (ref 100–111)
CHLORIDE SERPL-SCNC: 105 MMOL/L (ref 100–111)
CHLORIDE SERPL-SCNC: 106 MMOL/L (ref 100–111)
CO2 SERPL-SCNC: 24 MMOL/L (ref 21–32)
CO2 SERPL-SCNC: 24 MMOL/L (ref 21–32)
CO2 SERPL-SCNC: 30 MMOL/L (ref 21–32)
CREAT SERPL-MCNC: 1.39 MG/DL (ref 0.6–1.3)
CREAT SERPL-MCNC: 1.41 MG/DL (ref 0.6–1.3)
CREAT SERPL-MCNC: 1.43 MG/DL (ref 0.6–1.3)
DIFFERENTIAL METHOD BLD: ABNORMAL
EKG ATRIAL RATE: 107 BPM
EKG DIAGNOSIS: NORMAL
EKG P AXIS: 23 DEGREES
EKG P-R INTERVAL: 152 MS
EKG Q-T INTERVAL: 310 MS
EKG QRS DURATION: 84 MS
EKG QTC CALCULATION (BAZETT): 413 MS
EKG R AXIS: -20 DEGREES
EKG T AXIS: 15 DEGREES
EKG VENTRICULAR RATE: 107 BPM
EOSINOPHIL # BLD: 0 K/UL (ref 0–0.4)
EOSINOPHIL NFR BLD: 0 % (ref 0–5)
ERYTHROCYTE [DISTWIDTH] IN BLOOD BY AUTOMATED COUNT: 15.1 % (ref 11.6–14.5)
GLOBULIN SER CALC-MCNC: 3.1 G/DL (ref 2–4)
GLUCOSE BLD STRIP.AUTO-MCNC: 223 MG/DL (ref 70–110)
GLUCOSE BLD STRIP.AUTO-MCNC: 250 MG/DL (ref 70–110)
GLUCOSE BLD STRIP.AUTO-MCNC: 291 MG/DL (ref 70–110)
GLUCOSE BLD STRIP.AUTO-MCNC: 357 MG/DL (ref 70–110)
GLUCOSE SERPL-MCNC: 304 MG/DL (ref 74–99)
GLUCOSE SERPL-MCNC: 313 MG/DL (ref 74–99)
GLUCOSE SERPL-MCNC: 394 MG/DL (ref 74–99)
HCT VFR BLD AUTO: 32.1 % (ref 36–48)
HGB BLD-MCNC: 11 G/DL (ref 13–16)
IMM GRANULOCYTES # BLD AUTO: 0 K/UL
IMM GRANULOCYTES NFR BLD AUTO: 0 %
LACTATE SERPL-SCNC: 2.5 MMOL/L (ref 0.4–2)
LYMPHOCYTES # BLD: 1.3 K/UL (ref 0.9–3.6)
LYMPHOCYTES NFR BLD: 17 % (ref 21–52)
MCH RBC QN AUTO: 27.9 PG (ref 24–34)
MCHC RBC AUTO-ENTMCNC: 34.3 G/DL (ref 31–37)
MCV RBC AUTO: 81.5 FL (ref 78–100)
MONOCYTES # BLD: 0.6 K/UL (ref 0.05–1.2)
MONOCYTES NFR BLD: 8 % (ref 3–10)
NEUTS BAND NFR BLD MANUAL: 15 % (ref 0–5)
NEUTS SEG # BLD: 5.7 K/UL (ref 1.8–8)
NEUTS SEG NFR BLD: 60 % (ref 40–73)
NRBC # BLD: 0 K/UL (ref 0–0.01)
NRBC BLD-RTO: 0 PER 100 WBC
PHOSPHATE SERPL-MCNC: 2.3 MG/DL (ref 2.5–4.9)
PHOSPHATE SERPL-MCNC: 3 MG/DL (ref 2.5–4.9)
PLATELET # BLD AUTO: 114 K/UL (ref 135–420)
PLATELET COMMENT: ABNORMAL
PMV BLD AUTO: 9.7 FL (ref 9.2–11.8)
POTASSIUM SERPL-SCNC: 4.2 MMOL/L (ref 3.5–5.5)
POTASSIUM SERPL-SCNC: 4.4 MMOL/L (ref 3.5–5.5)
POTASSIUM SERPL-SCNC: 4.5 MMOL/L (ref 3.5–5.5)
PROT SERPL-MCNC: 5.7 G/DL (ref 6.4–8.2)
RBC # BLD AUTO: 3.94 M/UL (ref 4.35–5.65)
RBC MORPH BLD: ABNORMAL
SODIUM SERPL-SCNC: 134 MMOL/L (ref 136–145)
SODIUM SERPL-SCNC: 134 MMOL/L (ref 136–145)
SODIUM SERPL-SCNC: 136 MMOL/L (ref 136–145)
WBC # BLD AUTO: 7.6 K/UL (ref 4.6–13.2)

## 2023-03-04 PROCEDURE — 36415 COLL VENOUS BLD VENIPUNCTURE: CPT

## 2023-03-04 PROCEDURE — 2580000003 HC RX 258: Performed by: STUDENT IN AN ORGANIZED HEALTH CARE EDUCATION/TRAINING PROGRAM

## 2023-03-04 PROCEDURE — 2500000003 HC RX 250 WO HCPCS: Performed by: INTERNAL MEDICINE

## 2023-03-04 PROCEDURE — 80069 RENAL FUNCTION PANEL: CPT

## 2023-03-04 PROCEDURE — 6370000000 HC RX 637 (ALT 250 FOR IP): Performed by: INTERNAL MEDICINE

## 2023-03-04 PROCEDURE — 6360000002 HC RX W HCPCS

## 2023-03-04 PROCEDURE — 80053 COMPREHEN METABOLIC PANEL: CPT

## 2023-03-04 PROCEDURE — C9113 INJ PANTOPRAZOLE SODIUM, VIA: HCPCS | Performed by: STUDENT IN AN ORGANIZED HEALTH CARE EDUCATION/TRAINING PROGRAM

## 2023-03-04 PROCEDURE — 85025 COMPLETE CBC W/AUTO DIFF WBC: CPT

## 2023-03-04 PROCEDURE — 6360000002 HC RX W HCPCS: Performed by: INTERNAL MEDICINE

## 2023-03-04 PROCEDURE — 2580000003 HC RX 258: Performed by: INTERNAL MEDICINE

## 2023-03-04 PROCEDURE — 83605 ASSAY OF LACTIC ACID: CPT

## 2023-03-04 PROCEDURE — 2700000000 HC OXYGEN THERAPY PER DAY

## 2023-03-04 PROCEDURE — 71045 X-RAY EXAM CHEST 1 VIEW: CPT

## 2023-03-04 PROCEDURE — 76705 ECHO EXAM OF ABDOMEN: CPT

## 2023-03-04 PROCEDURE — 82962 GLUCOSE BLOOD TEST: CPT

## 2023-03-04 PROCEDURE — 1100000000 HC RM PRIVATE

## 2023-03-04 PROCEDURE — 99232 SBSQ HOSP IP/OBS MODERATE 35: CPT | Performed by: INTERNAL MEDICINE

## 2023-03-04 PROCEDURE — 6370000000 HC RX 637 (ALT 250 FOR IP): Performed by: STUDENT IN AN ORGANIZED HEALTH CARE EDUCATION/TRAINING PROGRAM

## 2023-03-04 PROCEDURE — 6360000002 HC RX W HCPCS: Performed by: STUDENT IN AN ORGANIZED HEALTH CARE EDUCATION/TRAINING PROGRAM

## 2023-03-04 PROCEDURE — 94761 N-INVAS EAR/PLS OXIMETRY MLT: CPT

## 2023-03-04 PROCEDURE — 93010 ELECTROCARDIOGRAM REPORT: CPT | Performed by: INTERNAL MEDICINE

## 2023-03-04 RX ORDER — LORAZEPAM 2 MG/ML
4 INJECTION INTRAMUSCULAR
Status: DISCONTINUED | OUTPATIENT
Start: 2023-03-04 | End: 2023-03-22 | Stop reason: HOSPADM

## 2023-03-04 RX ORDER — SODIUM CHLORIDE 9 MG/ML
INJECTION, SOLUTION INTRAVENOUS PRN
Status: DISCONTINUED | OUTPATIENT
Start: 2023-03-04 | End: 2023-03-04

## 2023-03-04 RX ORDER — SODIUM CHLORIDE 0.9 % (FLUSH) 0.9 %
5-40 SYRINGE (ML) INJECTION PRN
Status: DISCONTINUED | OUTPATIENT
Start: 2023-03-04 | End: 2023-03-05 | Stop reason: SDUPTHER

## 2023-03-04 RX ORDER — LORAZEPAM 1 MG/1
2 TABLET ORAL
Status: DISCONTINUED | OUTPATIENT
Start: 2023-03-04 | End: 2023-03-22 | Stop reason: HOSPADM

## 2023-03-04 RX ORDER — SODIUM CHLORIDE 9 MG/ML
INJECTION, SOLUTION INTRAVENOUS CONTINUOUS
Status: DISCONTINUED | OUTPATIENT
Start: 2023-03-04 | End: 2023-03-07

## 2023-03-04 RX ORDER — SODIUM CHLORIDE 0.9 % (FLUSH) 0.9 %
5-40 SYRINGE (ML) INJECTION EVERY 12 HOURS SCHEDULED
Status: DISCONTINUED | OUTPATIENT
Start: 2023-03-04 | End: 2023-03-05 | Stop reason: SDUPTHER

## 2023-03-04 RX ORDER — LANOLIN ALCOHOL/MO/W.PET/CERES
100 CREAM (GRAM) TOPICAL DAILY
Status: DISCONTINUED | OUTPATIENT
Start: 2023-03-04 | End: 2023-03-04

## 2023-03-04 RX ORDER — LORAZEPAM 2 MG/ML
1 INJECTION INTRAMUSCULAR
Status: DISCONTINUED | OUTPATIENT
Start: 2023-03-04 | End: 2023-03-22 | Stop reason: HOSPADM

## 2023-03-04 RX ORDER — LORAZEPAM 2 MG/ML
2 INJECTION INTRAMUSCULAR
Status: DISCONTINUED | OUTPATIENT
Start: 2023-03-04 | End: 2023-03-22 | Stop reason: HOSPADM

## 2023-03-04 RX ORDER — LORAZEPAM 1 MG/1
3 TABLET ORAL
Status: DISCONTINUED | OUTPATIENT
Start: 2023-03-04 | End: 2023-03-22 | Stop reason: HOSPADM

## 2023-03-04 RX ORDER — LORAZEPAM 2 MG/ML
3 INJECTION INTRAMUSCULAR
Status: DISCONTINUED | OUTPATIENT
Start: 2023-03-04 | End: 2023-03-22 | Stop reason: HOSPADM

## 2023-03-04 RX ORDER — LORAZEPAM 1 MG/1
1 TABLET ORAL
Status: DISCONTINUED | OUTPATIENT
Start: 2023-03-04 | End: 2023-03-22 | Stop reason: HOSPADM

## 2023-03-04 RX ORDER — LORAZEPAM 1 MG/1
4 TABLET ORAL
Status: DISCONTINUED | OUTPATIENT
Start: 2023-03-04 | End: 2023-03-22 | Stop reason: HOSPADM

## 2023-03-04 RX ADMIN — MORPHINE SULFATE 2 MG: 2 INJECTION, SOLUTION INTRAMUSCULAR; INTRAVENOUS at 09:54

## 2023-03-04 RX ADMIN — SODIUM CHLORIDE, PRESERVATIVE FREE 10 ML: 5 INJECTION INTRAVENOUS at 08:48

## 2023-03-04 RX ADMIN — SODIUM CHLORIDE, PRESERVATIVE FREE 10 ML: 5 INJECTION INTRAVENOUS at 09:30

## 2023-03-04 RX ADMIN — THIAMINE HYDROCHLORIDE 400 MG: 100 INJECTION, SOLUTION INTRAMUSCULAR; INTRAVENOUS at 03:49

## 2023-03-04 RX ADMIN — INSULIN GLARGINE 5 UNITS: 100 INJECTION, SOLUTION SUBCUTANEOUS at 22:01

## 2023-03-04 RX ADMIN — HEPARIN SODIUM 5000 UNITS: 5000 INJECTION INTRAVENOUS; SUBCUTANEOUS at 14:12

## 2023-03-04 RX ADMIN — SODIUM CHLORIDE, PRESERVATIVE FREE 10 ML: 5 INJECTION INTRAVENOUS at 22:03

## 2023-03-04 RX ADMIN — INSULIN LISPRO 8 UNITS: 100 INJECTION, SOLUTION INTRAVENOUS; SUBCUTANEOUS at 12:41

## 2023-03-04 RX ADMIN — MORPHINE SULFATE 2 MG: 2 INJECTION, SOLUTION INTRAMUSCULAR; INTRAVENOUS at 14:06

## 2023-03-04 RX ADMIN — SODIUM CHLORIDE: 9 INJECTION, SOLUTION INTRAVENOUS at 15:23

## 2023-03-04 RX ADMIN — SODIUM PHOSPHATE, MONOBASIC, MONOHYDRATE 30 MMOL: 276; 142 INJECTION, SOLUTION INTRAVENOUS at 03:38

## 2023-03-04 RX ADMIN — INSULIN LISPRO 4 UNITS: 100 INJECTION, SOLUTION INTRAVENOUS; SUBCUTANEOUS at 08:43

## 2023-03-04 RX ADMIN — MORPHINE SULFATE 2 MG: 2 INJECTION, SOLUTION INTRAMUSCULAR; INTRAVENOUS at 19:09

## 2023-03-04 RX ADMIN — THIAMINE HYDROCHLORIDE 400 MG: 100 INJECTION, SOLUTION INTRAMUSCULAR; INTRAVENOUS at 09:47

## 2023-03-04 RX ADMIN — HEPARIN SODIUM 5000 UNITS: 5000 INJECTION INTRAVENOUS; SUBCUTANEOUS at 06:30

## 2023-03-04 RX ADMIN — SODIUM BICARBONATE: 84 INJECTION, SOLUTION INTRAVENOUS at 03:48

## 2023-03-04 RX ADMIN — HEPARIN SODIUM 5000 UNITS: 5000 INJECTION INTRAVENOUS; SUBCUTANEOUS at 22:03

## 2023-03-04 RX ADMIN — PANTOPRAZOLE SODIUM 40 MG: 40 INJECTION, POWDER, FOR SOLUTION INTRAVENOUS at 08:39

## 2023-03-04 RX ADMIN — THIAMINE HYDROCHLORIDE 400 MG: 100 INJECTION, SOLUTION INTRAMUSCULAR; INTRAVENOUS at 17:44

## 2023-03-04 RX ADMIN — INSULIN LISPRO 4 UNITS: 100 INJECTION, SOLUTION INTRAVENOUS; SUBCUTANEOUS at 17:47

## 2023-03-04 ASSESSMENT — PAIN DESCRIPTION - DESCRIPTORS
DESCRIPTORS: THROBBING
DESCRIPTORS: TENDER;THROBBING
DESCRIPTORS: TENDER;SORE
DESCRIPTORS: THROBBING

## 2023-03-04 ASSESSMENT — PAIN DESCRIPTION - PAIN TYPE
TYPE: ACUTE PAIN

## 2023-03-04 ASSESSMENT — PAIN SCALES - GENERAL
PAINLEVEL_OUTOF10: 4
PAINLEVEL_OUTOF10: 4
PAINLEVEL_OUTOF10: 2
PAINLEVEL_OUTOF10: 2
PAINLEVEL_OUTOF10: 4

## 2023-03-04 ASSESSMENT — PAIN DESCRIPTION - LOCATION
LOCATION: ABDOMEN

## 2023-03-04 ASSESSMENT — PAIN DESCRIPTION - ORIENTATION
ORIENTATION: ANTERIOR
ORIENTATION: ANTERIOR;MID
ORIENTATION: ANTERIOR

## 2023-03-04 ASSESSMENT — PAIN - FUNCTIONAL ASSESSMENT
PAIN_FUNCTIONAL_ASSESSMENT: PREVENTS OR INTERFERES SOME ACTIVE ACTIVITIES AND ADLS
PAIN_FUNCTIONAL_ASSESSMENT: PREVENTS OR INTERFERES WITH MANY ACTIVE NOT PASSIVE ACTIVITIES

## 2023-03-05 LAB
ALBUMIN SERPL-MCNC: 2.3 G/DL (ref 3.4–5)
ALBUMIN/GLOB SERPL: 0.7 (ref 0.8–1.7)
ALP SERPL-CCNC: 43 U/L (ref 45–117)
ALT SERPL-CCNC: 20 U/L (ref 16–61)
ANION GAP SERPL CALC-SCNC: 5 MMOL/L (ref 3–18)
AST SERPL-CCNC: 64 U/L (ref 10–38)
BASOPHILS # BLD: 0 K/UL (ref 0–0.1)
BASOPHILS NFR BLD: 0 % (ref 0–2)
BILIRUB SERPL-MCNC: 2.2 MG/DL (ref 0.2–1)
BUN SERPL-MCNC: 31 MG/DL (ref 7–18)
BUN/CREAT SERPL: 26 (ref 12–20)
CA-I SERPL-SCNC: 0.93 MMOL/L (ref 1.15–1.33)
CALCIUM SERPL-MCNC: 6.8 MG/DL (ref 8.5–10.1)
CHLORIDE SERPL-SCNC: 106 MMOL/L (ref 100–111)
CO2 SERPL-SCNC: 27 MMOL/L (ref 21–32)
CREAT SERPL-MCNC: 1.21 MG/DL (ref 0.6–1.3)
DIFFERENTIAL METHOD BLD: ABNORMAL
EOSINOPHIL # BLD: 0 K/UL (ref 0–0.4)
EOSINOPHIL NFR BLD: 0 % (ref 0–5)
ERYTHROCYTE [DISTWIDTH] IN BLOOD BY AUTOMATED COUNT: 15.3 % (ref 11.6–14.5)
FERRITIN SERPL-MCNC: 392 NG/ML (ref 8–388)
GLOBULIN SER CALC-MCNC: 3.3 G/DL (ref 2–4)
GLUCOSE BLD STRIP.AUTO-MCNC: 176 MG/DL (ref 70–110)
GLUCOSE BLD STRIP.AUTO-MCNC: 192 MG/DL (ref 70–110)
GLUCOSE BLD STRIP.AUTO-MCNC: 246 MG/DL (ref 70–110)
GLUCOSE BLD STRIP.AUTO-MCNC: 257 MG/DL (ref 70–110)
GLUCOSE BLD STRIP.AUTO-MCNC: 268 MG/DL (ref 70–110)
GLUCOSE SERPL-MCNC: 245 MG/DL (ref 74–99)
HCT VFR BLD AUTO: 27.8 % (ref 36–48)
HGB BLD-MCNC: 9.4 G/DL (ref 13–16)
IMM GRANULOCYTES # BLD AUTO: 0 K/UL
IMM GRANULOCYTES NFR BLD AUTO: 0 %
IRON SATN MFR SERPL: 11 % (ref 20–50)
IRON SERPL-MCNC: 15 UG/DL (ref 50–175)
LACTATE SERPL-SCNC: 1.1 MMOL/L (ref 0.4–2)
LYMPHOCYTES # BLD: 0.3 K/UL (ref 0.9–3.6)
LYMPHOCYTES NFR BLD: 5 % (ref 21–52)
MCH RBC QN AUTO: 28.1 PG (ref 24–34)
MCHC RBC AUTO-ENTMCNC: 33.8 G/DL (ref 31–37)
MCV RBC AUTO: 83 FL (ref 78–100)
MONOCYTES # BLD: 0.1 K/UL (ref 0.05–1.2)
MONOCYTES NFR BLD: 2 % (ref 3–10)
NEUTS BAND NFR BLD MANUAL: 2 % (ref 0–5)
NEUTS SEG # BLD: 6 K/UL (ref 1.8–8)
NEUTS SEG NFR BLD: 91 % (ref 40–73)
NRBC # BLD: 0 K/UL (ref 0–0.01)
NRBC BLD-RTO: 0 PER 100 WBC
PLATELET # BLD AUTO: 104 K/UL (ref 135–420)
PLATELET COMMENT: ABNORMAL
PMV BLD AUTO: 9.8 FL (ref 9.2–11.8)
POTASSIUM SERPL-SCNC: 3.7 MMOL/L (ref 3.5–5.5)
PROT SERPL-MCNC: 5.6 G/DL (ref 6.4–8.2)
RBC # BLD AUTO: 3.35 M/UL (ref 4.35–5.65)
RBC MORPH BLD: ABNORMAL
SODIUM SERPL-SCNC: 138 MMOL/L (ref 136–145)
TIBC SERPL-MCNC: 138 UG/DL (ref 250–450)
WBC # BLD AUTO: 6.4 K/UL (ref 4.6–13.2)

## 2023-03-05 PROCEDURE — 6360000002 HC RX W HCPCS

## 2023-03-05 PROCEDURE — 1100000000 HC RM PRIVATE

## 2023-03-05 PROCEDURE — 94761 N-INVAS EAR/PLS OXIMETRY MLT: CPT

## 2023-03-05 PROCEDURE — C9113 INJ PANTOPRAZOLE SODIUM, VIA: HCPCS | Performed by: STUDENT IN AN ORGANIZED HEALTH CARE EDUCATION/TRAINING PROGRAM

## 2023-03-05 PROCEDURE — 80053 COMPREHEN METABOLIC PANEL: CPT

## 2023-03-05 PROCEDURE — 82962 GLUCOSE BLOOD TEST: CPT

## 2023-03-05 PROCEDURE — 83605 ASSAY OF LACTIC ACID: CPT

## 2023-03-05 PROCEDURE — 2580000003 HC RX 258: Performed by: INTERNAL MEDICINE

## 2023-03-05 PROCEDURE — 6360000002 HC RX W HCPCS: Performed by: STUDENT IN AN ORGANIZED HEALTH CARE EDUCATION/TRAINING PROGRAM

## 2023-03-05 PROCEDURE — 2500000003 HC RX 250 WO HCPCS: Performed by: INTERNAL MEDICINE

## 2023-03-05 PROCEDURE — 2580000003 HC RX 258: Performed by: STUDENT IN AN ORGANIZED HEALTH CARE EDUCATION/TRAINING PROGRAM

## 2023-03-05 PROCEDURE — 83540 ASSAY OF IRON: CPT

## 2023-03-05 PROCEDURE — 36415 COLL VENOUS BLD VENIPUNCTURE: CPT

## 2023-03-05 PROCEDURE — 85025 COMPLETE CBC W/AUTO DIFF WBC: CPT

## 2023-03-05 PROCEDURE — 6360000002 HC RX W HCPCS: Performed by: INTERNAL MEDICINE

## 2023-03-05 PROCEDURE — 82330 ASSAY OF CALCIUM: CPT

## 2023-03-05 PROCEDURE — 6370000000 HC RX 637 (ALT 250 FOR IP): Performed by: STUDENT IN AN ORGANIZED HEALTH CARE EDUCATION/TRAINING PROGRAM

## 2023-03-05 PROCEDURE — 99232 SBSQ HOSP IP/OBS MODERATE 35: CPT | Performed by: INTERNAL MEDICINE

## 2023-03-05 PROCEDURE — 6370000000 HC RX 637 (ALT 250 FOR IP): Performed by: INTERNAL MEDICINE

## 2023-03-05 PROCEDURE — 82728 ASSAY OF FERRITIN: CPT

## 2023-03-05 RX ORDER — INSULIN LISPRO 100 [IU]/ML
0-4 INJECTION, SOLUTION INTRAVENOUS; SUBCUTANEOUS NIGHTLY
Status: DISCONTINUED | OUTPATIENT
Start: 2023-03-05 | End: 2023-03-11

## 2023-03-05 RX ORDER — CARVEDILOL 3.12 MG/1
3.12 TABLET ORAL 2 TIMES DAILY WITH MEALS
Status: DISCONTINUED | OUTPATIENT
Start: 2023-03-05 | End: 2023-03-22 | Stop reason: HOSPADM

## 2023-03-05 RX ORDER — INSULIN LISPRO 100 [IU]/ML
0-16 INJECTION, SOLUTION INTRAVENOUS; SUBCUTANEOUS
Status: DISCONTINUED | OUTPATIENT
Start: 2023-03-05 | End: 2023-03-11

## 2023-03-05 RX ORDER — HYDRALAZINE HYDROCHLORIDE 20 MG/ML
10 INJECTION INTRAMUSCULAR; INTRAVENOUS EVERY 6 HOURS PRN
Status: DISCONTINUED | OUTPATIENT
Start: 2023-03-05 | End: 2023-03-22 | Stop reason: HOSPADM

## 2023-03-05 RX ORDER — INSULIN GLARGINE 100 [IU]/ML
10 INJECTION, SOLUTION SUBCUTANEOUS NIGHTLY
Status: DISCONTINUED | OUTPATIENT
Start: 2023-03-05 | End: 2023-03-22 | Stop reason: HOSPADM

## 2023-03-05 RX ORDER — CLONIDINE 0.1 MG/24H
1 PATCH, EXTENDED RELEASE TRANSDERMAL WEEKLY
Status: DISCONTINUED | OUTPATIENT
Start: 2023-03-05 | End: 2023-03-22 | Stop reason: HOSPADM

## 2023-03-05 RX ORDER — INSULIN LISPRO 100 [IU]/ML
3 INJECTION, SOLUTION INTRAVENOUS; SUBCUTANEOUS ONCE
Status: COMPLETED | OUTPATIENT
Start: 2023-03-05 | End: 2023-03-05

## 2023-03-05 RX ORDER — INSULIN LISPRO 100 [IU]/ML
4 INJECTION, SOLUTION INTRAVENOUS; SUBCUTANEOUS
Status: DISCONTINUED | OUTPATIENT
Start: 2023-03-05 | End: 2023-03-06

## 2023-03-05 RX ORDER — CALCIUM GLUCONATE 20 MG/ML
1000 INJECTION, SOLUTION INTRAVENOUS ONCE
Status: COMPLETED | OUTPATIENT
Start: 2023-03-05 | End: 2023-03-05

## 2023-03-05 RX ADMIN — THIAMINE HYDROCHLORIDE 400 MG: 100 INJECTION, SOLUTION INTRAMUSCULAR; INTRAVENOUS at 18:37

## 2023-03-05 RX ADMIN — CALCIUM GLUCONATE 1000 MG: 20 INJECTION, SOLUTION INTRAVENOUS at 16:40

## 2023-03-05 RX ADMIN — SODIUM CHLORIDE: 9 INJECTION, SOLUTION INTRAVENOUS at 22:21

## 2023-03-05 RX ADMIN — PANTOPRAZOLE SODIUM 40 MG: 40 INJECTION, POWDER, FOR SOLUTION INTRAVENOUS at 08:11

## 2023-03-05 RX ADMIN — THIAMINE HYDROCHLORIDE 400 MG: 100 INJECTION, SOLUTION INTRAMUSCULAR; INTRAVENOUS at 08:12

## 2023-03-05 RX ADMIN — MORPHINE SULFATE 2 MG: 2 INJECTION, SOLUTION INTRAMUSCULAR; INTRAVENOUS at 13:46

## 2023-03-05 RX ADMIN — INSULIN LISPRO 3 UNITS: 100 INJECTION, SOLUTION INTRAVENOUS; SUBCUTANEOUS at 10:40

## 2023-03-05 RX ADMIN — INSULIN LISPRO 8 UNITS: 100 INJECTION, SOLUTION INTRAVENOUS; SUBCUTANEOUS at 16:54

## 2023-03-05 RX ADMIN — THIAMINE HYDROCHLORIDE 400 MG: 100 INJECTION, SOLUTION INTRAMUSCULAR; INTRAVENOUS at 01:14

## 2023-03-05 RX ADMIN — MORPHINE SULFATE 2 MG: 2 INJECTION, SOLUTION INTRAMUSCULAR; INTRAVENOUS at 08:12

## 2023-03-05 RX ADMIN — ONDANSETRON 4 MG: 2 INJECTION INTRAMUSCULAR; INTRAVENOUS at 21:11

## 2023-03-05 RX ADMIN — HYDRALAZINE HYDROCHLORIDE 10 MG: 20 INJECTION INTRAMUSCULAR; INTRAVENOUS at 10:42

## 2023-03-05 RX ADMIN — HEPARIN SODIUM 5000 UNITS: 5000 INJECTION INTRAVENOUS; SUBCUTANEOUS at 05:27

## 2023-03-05 RX ADMIN — HEPARIN SODIUM 5000 UNITS: 5000 INJECTION INTRAVENOUS; SUBCUTANEOUS at 13:49

## 2023-03-05 RX ADMIN — ALUMINA, MAGNESIA, AND SIMETHICONE ORAL SUSPENSION REGULAR STRENGTH 30 ML: 1200; 1200; 120 SUSPENSION ORAL at 10:36

## 2023-03-05 RX ADMIN — MORPHINE SULFATE 2 MG: 2 INJECTION, SOLUTION INTRAMUSCULAR; INTRAVENOUS at 19:51

## 2023-03-05 RX ADMIN — INSULIN GLARGINE 10 UNITS: 100 INJECTION, SOLUTION SUBCUTANEOUS at 21:06

## 2023-03-05 RX ADMIN — SODIUM CHLORIDE: 9 INJECTION, SOLUTION INTRAVENOUS at 01:13

## 2023-03-05 RX ADMIN — POLYETHYLENE GLYCOL 3350 17 G: 17 POWDER, FOR SOLUTION ORAL at 10:37

## 2023-03-05 RX ADMIN — MORPHINE SULFATE 4 MG: 4 INJECTION, SOLUTION INTRAMUSCULAR; INTRAVENOUS at 16:40

## 2023-03-05 RX ADMIN — MORPHINE SULFATE 2 MG: 2 INJECTION, SOLUTION INTRAMUSCULAR; INTRAVENOUS at 23:42

## 2023-03-05 RX ADMIN — CARVEDILOL 3.12 MG: 3.12 TABLET, FILM COATED ORAL at 16:41

## 2023-03-05 RX ADMIN — INSULIN LISPRO 4 UNITS: 100 INJECTION, SOLUTION INTRAVENOUS; SUBCUTANEOUS at 08:19

## 2023-03-05 RX ADMIN — SODIUM CHLORIDE, PRESERVATIVE FREE 10 ML: 5 INJECTION INTRAVENOUS at 21:09

## 2023-03-05 RX ADMIN — ACETAMINOPHEN 650 MG: 325 TABLET, FILM COATED ORAL at 10:33

## 2023-03-05 RX ADMIN — SODIUM CHLORIDE, PRESERVATIVE FREE 10 ML: 5 INJECTION INTRAVENOUS at 10:48

## 2023-03-05 RX ADMIN — SODIUM CHLORIDE, PRESERVATIVE FREE 10 ML: 5 INJECTION INTRAVENOUS at 23:42

## 2023-03-05 RX ADMIN — MORPHINE SULFATE 2 MG: 2 INJECTION, SOLUTION INTRAMUSCULAR; INTRAVENOUS at 05:31

## 2023-03-05 RX ADMIN — MORPHINE SULFATE 4 MG: 4 INJECTION, SOLUTION INTRAMUSCULAR; INTRAVENOUS at 01:12

## 2023-03-05 RX ADMIN — INSULIN LISPRO 4 UNITS: 100 INJECTION, SOLUTION INTRAVENOUS; SUBCUTANEOUS at 18:38

## 2023-03-05 RX ADMIN — HEPARIN SODIUM 5000 UNITS: 5000 INJECTION INTRAVENOUS; SUBCUTANEOUS at 21:07

## 2023-03-05 ASSESSMENT — PAIN DESCRIPTION - DESCRIPTORS
DESCRIPTORS: ACHING
DESCRIPTORS: ACHING;THROBBING;TIGHTNESS
DESCRIPTORS: ACHING
DESCRIPTORS: THROBBING;PRESSURE

## 2023-03-05 ASSESSMENT — PAIN SCALES - GENERAL
PAINLEVEL_OUTOF10: 4
PAINLEVEL_OUTOF10: 6
PAINLEVEL_OUTOF10: 7
PAINLEVEL_OUTOF10: 5
PAINLEVEL_OUTOF10: 6
PAINLEVEL_OUTOF10: 0
PAINLEVEL_OUTOF10: 2

## 2023-03-05 ASSESSMENT — PAIN DESCRIPTION - LOCATION
LOCATION: ABDOMEN
LOCATION: HEAD;ABDOMEN

## 2023-03-05 ASSESSMENT — PAIN DESCRIPTION - PAIN TYPE: TYPE: ACUTE PAIN

## 2023-03-05 ASSESSMENT — PAIN DESCRIPTION - ORIENTATION
ORIENTATION: ANTERIOR

## 2023-03-05 ASSESSMENT — PAIN - FUNCTIONAL ASSESSMENT
PAIN_FUNCTIONAL_ASSESSMENT: PREVENTS OR INTERFERES WITH MANY ACTIVE NOT PASSIVE ACTIVITIES
PAIN_FUNCTIONAL_ASSESSMENT: PREVENTS OR INTERFERES SOME ACTIVE ACTIVITIES AND ADLS

## 2023-03-06 LAB
ALBUMIN SERPL-MCNC: 2.5 G/DL (ref 3.4–5)
ALBUMIN/GLOB SERPL: 0.7 (ref 0.8–1.7)
ALP SERPL-CCNC: 47 U/L (ref 45–117)
ALT SERPL-CCNC: 22 U/L (ref 16–61)
ANION GAP SERPL CALC-SCNC: 7 MMOL/L (ref 3–18)
AST SERPL-CCNC: 42 U/L (ref 10–38)
BASOPHILS # BLD: 0 K/UL (ref 0–0.1)
BASOPHILS NFR BLD: 0 % (ref 0–2)
BILIRUB SERPL-MCNC: 1.9 MG/DL (ref 0.2–1)
BUN SERPL-MCNC: 26 MG/DL (ref 7–18)
BUN/CREAT SERPL: 23 (ref 12–20)
CALCIUM SERPL-MCNC: 7.5 MG/DL (ref 8.5–10.1)
CHLORIDE SERPL-SCNC: 106 MMOL/L (ref 100–111)
CO2 SERPL-SCNC: 26 MMOL/L (ref 21–32)
CREAT SERPL-MCNC: 1.11 MG/DL (ref 0.6–1.3)
DIFFERENTIAL METHOD BLD: ABNORMAL
EOSINOPHIL # BLD: 0 K/UL (ref 0–0.4)
EOSINOPHIL NFR BLD: 0 % (ref 0–5)
ERYTHROCYTE [DISTWIDTH] IN BLOOD BY AUTOMATED COUNT: 15.4 % (ref 11.6–14.5)
GLOBULIN SER CALC-MCNC: 3.5 G/DL (ref 2–4)
GLUCOSE BLD STRIP.AUTO-MCNC: 126 MG/DL (ref 70–110)
GLUCOSE BLD STRIP.AUTO-MCNC: 143 MG/DL (ref 70–110)
GLUCOSE BLD STRIP.AUTO-MCNC: 179 MG/DL (ref 70–110)
GLUCOSE BLD STRIP.AUTO-MCNC: 205 MG/DL (ref 70–110)
GLUCOSE SERPL-MCNC: 174 MG/DL (ref 74–99)
HCT VFR BLD AUTO: 30.2 % (ref 36–48)
HGB BLD-MCNC: 9.7 G/DL (ref 13–16)
IMM GRANULOCYTES # BLD AUTO: 0 K/UL (ref 0–0.04)
IMM GRANULOCYTES NFR BLD AUTO: 0 % (ref 0–0.5)
LYMPHOCYTES # BLD: 0.3 K/UL (ref 0.9–3.6)
LYMPHOCYTES NFR BLD: 3 % (ref 21–52)
MCH RBC QN AUTO: 27.7 PG (ref 24–34)
MCHC RBC AUTO-ENTMCNC: 32.1 G/DL (ref 31–37)
MCV RBC AUTO: 86.3 FL (ref 78–100)
METAMYELOCYTES NFR BLD MANUAL: 1 %
MONOCYTES # BLD: 0.3 K/UL (ref 0.05–1.2)
MONOCYTES NFR BLD: 3 % (ref 3–10)
MYELOCYTES NFR BLD MANUAL: 1 %
NEUTS BAND NFR BLD MANUAL: 3 %
NEUTS SEG # BLD: 8.6 K/UL (ref 1.8–8)
NEUTS SEG NFR BLD: 89 % (ref 40–73)
NRBC # BLD: 0 K/UL (ref 0–0.01)
NRBC BLD-RTO: 0 PER 100 WBC
PHOSPHATE SERPL-MCNC: 1.9 MG/DL (ref 2.5–4.9)
PLATELET # BLD AUTO: 136 K/UL (ref 135–420)
PLATELET COMMENT: ABNORMAL
PMV BLD AUTO: 9.6 FL (ref 9.2–11.8)
POTASSIUM SERPL-SCNC: 3.8 MMOL/L (ref 3.5–5.5)
PROT SERPL-MCNC: 6 G/DL (ref 6.4–8.2)
RBC # BLD AUTO: 3.5 M/UL (ref 4.35–5.65)
RBC MORPH BLD: ABNORMAL
RBC MORPH BLD: ABNORMAL
SODIUM SERPL-SCNC: 139 MMOL/L (ref 136–145)
WBC # BLD AUTO: 9.3 K/UL (ref 4.6–13.2)
WBC MORPH BLD: ABNORMAL

## 2023-03-06 PROCEDURE — 6370000000 HC RX 637 (ALT 250 FOR IP): Performed by: INTERNAL MEDICINE

## 2023-03-06 PROCEDURE — 2580000003 HC RX 258: Performed by: STUDENT IN AN ORGANIZED HEALTH CARE EDUCATION/TRAINING PROGRAM

## 2023-03-06 PROCEDURE — 2580000003 HC RX 258: Performed by: INTERNAL MEDICINE

## 2023-03-06 PROCEDURE — 94761 N-INVAS EAR/PLS OXIMETRY MLT: CPT

## 2023-03-06 PROCEDURE — 84100 ASSAY OF PHOSPHORUS: CPT

## 2023-03-06 PROCEDURE — 6360000002 HC RX W HCPCS: Performed by: STUDENT IN AN ORGANIZED HEALTH CARE EDUCATION/TRAINING PROGRAM

## 2023-03-06 PROCEDURE — 85025 COMPLETE CBC W/AUTO DIFF WBC: CPT

## 2023-03-06 PROCEDURE — 99232 SBSQ HOSP IP/OBS MODERATE 35: CPT | Performed by: INTERNAL MEDICINE

## 2023-03-06 PROCEDURE — C9113 INJ PANTOPRAZOLE SODIUM, VIA: HCPCS | Performed by: STUDENT IN AN ORGANIZED HEALTH CARE EDUCATION/TRAINING PROGRAM

## 2023-03-06 PROCEDURE — 6360000002 HC RX W HCPCS

## 2023-03-06 PROCEDURE — 82962 GLUCOSE BLOOD TEST: CPT

## 2023-03-06 PROCEDURE — 6360000002 HC RX W HCPCS: Performed by: INTERNAL MEDICINE

## 2023-03-06 PROCEDURE — 1100000000 HC RM PRIVATE

## 2023-03-06 PROCEDURE — 2500000003 HC RX 250 WO HCPCS: Performed by: INTERNAL MEDICINE

## 2023-03-06 PROCEDURE — 2700000000 HC OXYGEN THERAPY PER DAY

## 2023-03-06 PROCEDURE — 80053 COMPREHEN METABOLIC PANEL: CPT

## 2023-03-06 PROCEDURE — 36415 COLL VENOUS BLD VENIPUNCTURE: CPT

## 2023-03-06 RX ORDER — INSULIN LISPRO 100 [IU]/ML
5 INJECTION, SOLUTION INTRAVENOUS; SUBCUTANEOUS
Status: DISCONTINUED | OUTPATIENT
Start: 2023-03-06 | End: 2023-03-08

## 2023-03-06 RX ADMIN — THIAMINE HYDROCHLORIDE 400 MG: 100 INJECTION, SOLUTION INTRAMUSCULAR; INTRAVENOUS at 17:53

## 2023-03-06 RX ADMIN — INSULIN GLARGINE 10 UNITS: 100 INJECTION, SOLUTION SUBCUTANEOUS at 21:58

## 2023-03-06 RX ADMIN — DIBASIC SODIUM PHOSPHATE, MONOBASIC POTASSIUM PHOSPHATE AND MONOBASIC SODIUM PHOSPHATE 1 TABLET: 852; 155; 130 TABLET ORAL at 09:11

## 2023-03-06 RX ADMIN — POTASSIUM PHOSPHATE, MONOBASIC AND POTASSIUM PHOSPHATE, DIBASIC 15 MMOL: 224; 236 INJECTION, SOLUTION, CONCENTRATE INTRAVENOUS at 15:34

## 2023-03-06 RX ADMIN — HEPARIN SODIUM 5000 UNITS: 5000 INJECTION INTRAVENOUS; SUBCUTANEOUS at 12:40

## 2023-03-06 RX ADMIN — MORPHINE SULFATE 2 MG: 2 INJECTION, SOLUTION INTRAMUSCULAR; INTRAVENOUS at 23:46

## 2023-03-06 RX ADMIN — INSULIN LISPRO 4 UNITS: 100 INJECTION, SOLUTION INTRAVENOUS; SUBCUTANEOUS at 12:46

## 2023-03-06 RX ADMIN — MORPHINE SULFATE 2 MG: 2 INJECTION, SOLUTION INTRAMUSCULAR; INTRAVENOUS at 17:52

## 2023-03-06 RX ADMIN — SODIUM CHLORIDE, PRESERVATIVE FREE 10 ML: 5 INJECTION INTRAVENOUS at 09:16

## 2023-03-06 RX ADMIN — PANTOPRAZOLE SODIUM 40 MG: 40 INJECTION, POWDER, FOR SOLUTION INTRAVENOUS at 09:11

## 2023-03-06 RX ADMIN — HEPARIN SODIUM 5000 UNITS: 5000 INJECTION INTRAVENOUS; SUBCUTANEOUS at 05:23

## 2023-03-06 RX ADMIN — HEPARIN SODIUM 5000 UNITS: 5000 INJECTION INTRAVENOUS; SUBCUTANEOUS at 21:47

## 2023-03-06 RX ADMIN — MORPHINE SULFATE 2 MG: 2 INJECTION, SOLUTION INTRAMUSCULAR; INTRAVENOUS at 15:33

## 2023-03-06 RX ADMIN — MORPHINE SULFATE 2 MG: 2 INJECTION, SOLUTION INTRAMUSCULAR; INTRAVENOUS at 11:16

## 2023-03-06 RX ADMIN — SODIUM CHLORIDE, PRESERVATIVE FREE 10 ML: 5 INJECTION INTRAVENOUS at 05:23

## 2023-03-06 RX ADMIN — SODIUM CHLORIDE, PRESERVATIVE FREE 10 ML: 5 INJECTION INTRAVENOUS at 02:11

## 2023-03-06 RX ADMIN — MORPHINE SULFATE 2 MG: 2 INJECTION, SOLUTION INTRAMUSCULAR; INTRAVENOUS at 21:46

## 2023-03-06 RX ADMIN — CARVEDILOL 3.12 MG: 3.12 TABLET, FILM COATED ORAL at 17:29

## 2023-03-06 RX ADMIN — DIBASIC SODIUM PHOSPHATE, MONOBASIC POTASSIUM PHOSPHATE AND MONOBASIC SODIUM PHOSPHATE 1 TABLET: 852; 155; 130 TABLET ORAL at 21:46

## 2023-03-06 RX ADMIN — MORPHINE SULFATE 2 MG: 2 INJECTION, SOLUTION INTRAMUSCULAR; INTRAVENOUS at 05:23

## 2023-03-06 RX ADMIN — SODIUM CHLORIDE, PRESERVATIVE FREE 10 ML: 5 INJECTION INTRAVENOUS at 21:46

## 2023-03-06 RX ADMIN — MORPHINE SULFATE 2 MG: 2 INJECTION, SOLUTION INTRAMUSCULAR; INTRAVENOUS at 02:11

## 2023-03-06 RX ADMIN — CARVEDILOL 3.12 MG: 3.12 TABLET, FILM COATED ORAL at 09:11

## 2023-03-06 RX ADMIN — INSULIN LISPRO 4 UNITS: 100 INJECTION, SOLUTION INTRAVENOUS; SUBCUTANEOUS at 12:42

## 2023-03-06 ASSESSMENT — PAIN DESCRIPTION - LOCATION
LOCATION: ABDOMEN

## 2023-03-06 ASSESSMENT — PAIN DESCRIPTION - DESCRIPTORS
DESCRIPTORS: ACHING
DESCRIPTORS: ACHING;CRAMPING
DESCRIPTORS: ACHING

## 2023-03-06 ASSESSMENT — PAIN DESCRIPTION - ORIENTATION
ORIENTATION: ANTERIOR
ORIENTATION: MID
ORIENTATION: MID
ORIENTATION: ANTERIOR

## 2023-03-06 ASSESSMENT — PAIN SCALES - GENERAL
PAINLEVEL_OUTOF10: 2
PAINLEVEL_OUTOF10: 2
PAINLEVEL_OUTOF10: 4
PAINLEVEL_OUTOF10: 2
PAINLEVEL_OUTOF10: 5
PAINLEVEL_OUTOF10: 4
PAINLEVEL_OUTOF10: 5
PAINLEVEL_OUTOF10: 7
PAINLEVEL_OUTOF10: 10
PAINLEVEL_OUTOF10: 0
PAINLEVEL_OUTOF10: 6

## 2023-03-07 ENCOUNTER — APPOINTMENT (OUTPATIENT)
Facility: HOSPITAL | Age: 68
DRG: 438 | End: 2023-03-07
Payer: MEDICARE

## 2023-03-07 LAB
ALBUMIN SERPL-MCNC: 2.1 G/DL (ref 3.4–5)
ALBUMIN/GLOB SERPL: 0.6 (ref 0.8–1.7)
ALP SERPL-CCNC: 43 U/L (ref 45–117)
ALT SERPL-CCNC: 18 U/L (ref 16–61)
ANION GAP SERPL CALC-SCNC: 4 MMOL/L (ref 3–18)
AST SERPL-CCNC: 23 U/L (ref 10–38)
BASOPHILS # BLD: 0 K/UL (ref 0–0.1)
BASOPHILS NFR BLD: 0 % (ref 0–2)
BILIRUB SERPL-MCNC: 1.4 MG/DL (ref 0.2–1)
BUN SERPL-MCNC: 27 MG/DL (ref 7–18)
BUN/CREAT SERPL: 25 (ref 12–20)
CALCIUM SERPL-MCNC: 7.3 MG/DL (ref 8.5–10.1)
CHLORIDE SERPL-SCNC: 107 MMOL/L (ref 100–111)
CO2 SERPL-SCNC: 27 MMOL/L (ref 21–32)
CREAT SERPL-MCNC: 1.06 MG/DL (ref 0.6–1.3)
DIFFERENTIAL METHOD BLD: ABNORMAL
EOSINOPHIL # BLD: 0.1 K/UL (ref 0–0.4)
EOSINOPHIL NFR BLD: 1 % (ref 0–5)
ERYTHROCYTE [DISTWIDTH] IN BLOOD BY AUTOMATED COUNT: 15.2 % (ref 11.6–14.5)
GLOBULIN SER CALC-MCNC: 3.4 G/DL (ref 2–4)
GLUCOSE BLD STRIP.AUTO-MCNC: 171 MG/DL (ref 70–110)
GLUCOSE BLD STRIP.AUTO-MCNC: 184 MG/DL (ref 70–110)
GLUCOSE BLD STRIP.AUTO-MCNC: 206 MG/DL (ref 70–110)
GLUCOSE BLD STRIP.AUTO-MCNC: 220 MG/DL (ref 70–110)
GLUCOSE SERPL-MCNC: 178 MG/DL (ref 74–99)
HCT VFR BLD AUTO: 28.8 % (ref 36–48)
HGB BLD-MCNC: 9.2 G/DL (ref 13–16)
IMM GRANULOCYTES # BLD AUTO: 0 K/UL (ref 0–0.04)
IMM GRANULOCYTES NFR BLD AUTO: 0 % (ref 0–0.5)
LYMPHOCYTES # BLD: 0.4 K/UL (ref 0.9–3.6)
LYMPHOCYTES NFR BLD: 7 % (ref 21–52)
MCH RBC QN AUTO: 28.1 PG (ref 24–34)
MCHC RBC AUTO-ENTMCNC: 31.9 G/DL (ref 31–37)
MCV RBC AUTO: 88.1 FL (ref 78–100)
MONOCYTES # BLD: 0.1 K/UL (ref 0.05–1.2)
MONOCYTES NFR BLD: 2 % (ref 3–10)
NEUTS BAND NFR BLD MANUAL: 7 %
NEUTS SEG # BLD: 5.4 K/UL (ref 1.8–8)
NEUTS SEG NFR BLD: 83 % (ref 40–73)
NRBC # BLD: 0 K/UL (ref 0–0.01)
NRBC BLD-RTO: 0 PER 100 WBC
NT PRO BNP: 332 PG/ML (ref 0–900)
PLATELET # BLD AUTO: 152 K/UL (ref 135–420)
PLATELET COMMENT: ABNORMAL
PMV BLD AUTO: 9.6 FL (ref 9.2–11.8)
POTASSIUM SERPL-SCNC: 4.2 MMOL/L (ref 3.5–5.5)
PROT SERPL-MCNC: 5.5 G/DL (ref 6.4–8.2)
RBC # BLD AUTO: 3.27 M/UL (ref 4.35–5.65)
RBC MORPH BLD: ABNORMAL
SODIUM SERPL-SCNC: 138 MMOL/L (ref 136–145)
WBC # BLD AUTO: 6 K/UL (ref 4.6–13.2)
WBC MORPH BLD: ABNORMAL

## 2023-03-07 PROCEDURE — 2580000003 HC RX 258: Performed by: INTERNAL MEDICINE

## 2023-03-07 PROCEDURE — 99232 SBSQ HOSP IP/OBS MODERATE 35: CPT | Performed by: INTERNAL MEDICINE

## 2023-03-07 PROCEDURE — 82962 GLUCOSE BLOOD TEST: CPT

## 2023-03-07 PROCEDURE — 2580000003 HC RX 258: Performed by: STUDENT IN AN ORGANIZED HEALTH CARE EDUCATION/TRAINING PROGRAM

## 2023-03-07 PROCEDURE — 94761 N-INVAS EAR/PLS OXIMETRY MLT: CPT

## 2023-03-07 PROCEDURE — 83880 ASSAY OF NATRIURETIC PEPTIDE: CPT

## 2023-03-07 PROCEDURE — 1100000000 HC RM PRIVATE

## 2023-03-07 PROCEDURE — 97116 GAIT TRAINING THERAPY: CPT

## 2023-03-07 PROCEDURE — 6370000000 HC RX 637 (ALT 250 FOR IP): Performed by: INTERNAL MEDICINE

## 2023-03-07 PROCEDURE — 74018 RADEX ABDOMEN 1 VIEW: CPT

## 2023-03-07 PROCEDURE — 6360000002 HC RX W HCPCS

## 2023-03-07 PROCEDURE — 6360000002 HC RX W HCPCS: Performed by: INTERNAL MEDICINE

## 2023-03-07 PROCEDURE — 71045 X-RAY EXAM CHEST 1 VIEW: CPT

## 2023-03-07 PROCEDURE — 36415 COLL VENOUS BLD VENIPUNCTURE: CPT

## 2023-03-07 PROCEDURE — 2700000000 HC OXYGEN THERAPY PER DAY

## 2023-03-07 PROCEDURE — 80053 COMPREHEN METABOLIC PANEL: CPT

## 2023-03-07 PROCEDURE — C9113 INJ PANTOPRAZOLE SODIUM, VIA: HCPCS | Performed by: STUDENT IN AN ORGANIZED HEALTH CARE EDUCATION/TRAINING PROGRAM

## 2023-03-07 PROCEDURE — 6360000002 HC RX W HCPCS: Performed by: STUDENT IN AN ORGANIZED HEALTH CARE EDUCATION/TRAINING PROGRAM

## 2023-03-07 PROCEDURE — 85025 COMPLETE CBC W/AUTO DIFF WBC: CPT

## 2023-03-07 PROCEDURE — 97535 SELF CARE MNGMENT TRAINING: CPT

## 2023-03-07 PROCEDURE — 97166 OT EVAL MOD COMPLEX 45 MIN: CPT

## 2023-03-07 PROCEDURE — 97162 PT EVAL MOD COMPLEX 30 MIN: CPT

## 2023-03-07 RX ORDER — SENNA AND DOCUSATE SODIUM 50; 8.6 MG/1; MG/1
2 TABLET, FILM COATED ORAL DAILY
Status: DISCONTINUED | OUTPATIENT
Start: 2023-03-07 | End: 2023-03-22 | Stop reason: HOSPADM

## 2023-03-07 RX ORDER — FUROSEMIDE 10 MG/ML
20 INJECTION INTRAMUSCULAR; INTRAVENOUS ONCE
Status: COMPLETED | OUTPATIENT
Start: 2023-03-07 | End: 2023-03-07

## 2023-03-07 RX ADMIN — SODIUM CHLORIDE: 9 INJECTION, SOLUTION INTRAVENOUS at 05:33

## 2023-03-07 RX ADMIN — PANTOPRAZOLE SODIUM 40 MG: 40 INJECTION, POWDER, FOR SOLUTION INTRAVENOUS at 09:22

## 2023-03-07 RX ADMIN — MORPHINE SULFATE 2 MG: 2 INJECTION, SOLUTION INTRAMUSCULAR; INTRAVENOUS at 02:18

## 2023-03-07 RX ADMIN — INSULIN GLARGINE 10 UNITS: 100 INJECTION, SOLUTION SUBCUTANEOUS at 22:03

## 2023-03-07 RX ADMIN — MORPHINE SULFATE 2 MG: 2 INJECTION, SOLUTION INTRAMUSCULAR; INTRAVENOUS at 14:39

## 2023-03-07 RX ADMIN — CARVEDILOL 3.12 MG: 3.12 TABLET, FILM COATED ORAL at 18:13

## 2023-03-07 RX ADMIN — DIBASIC SODIUM PHOSPHATE, MONOBASIC POTASSIUM PHOSPHATE AND MONOBASIC SODIUM PHOSPHATE 1 TABLET: 852; 155; 130 TABLET ORAL at 22:01

## 2023-03-07 RX ADMIN — SODIUM CHLORIDE, PRESERVATIVE FREE 10 ML: 5 INJECTION INTRAVENOUS at 22:10

## 2023-03-07 RX ADMIN — SENNOSIDES AND DOCUSATE SODIUM 2 TABLET: 50; 8.6 TABLET ORAL at 14:05

## 2023-03-07 RX ADMIN — SODIUM CHLORIDE, PRESERVATIVE FREE 10 ML: 5 INJECTION INTRAVENOUS at 09:22

## 2023-03-07 RX ADMIN — MORPHINE SULFATE 2 MG: 2 INJECTION, SOLUTION INTRAMUSCULAR; INTRAVENOUS at 09:17

## 2023-03-07 RX ADMIN — FUROSEMIDE 20 MG: 10 INJECTION, SOLUTION INTRAMUSCULAR; INTRAVENOUS at 18:12

## 2023-03-07 RX ADMIN — MORPHINE SULFATE 2 MG: 2 INJECTION, SOLUTION INTRAMUSCULAR; INTRAVENOUS at 22:01

## 2023-03-07 RX ADMIN — MORPHINE SULFATE 2 MG: 2 INJECTION, SOLUTION INTRAMUSCULAR; INTRAVENOUS at 18:23

## 2023-03-07 RX ADMIN — HEPARIN SODIUM 5000 UNITS: 5000 INJECTION INTRAVENOUS; SUBCUTANEOUS at 22:01

## 2023-03-07 RX ADMIN — CARVEDILOL 3.12 MG: 3.12 TABLET, FILM COATED ORAL at 09:19

## 2023-03-07 RX ADMIN — HEPARIN SODIUM 5000 UNITS: 5000 INJECTION INTRAVENOUS; SUBCUTANEOUS at 05:22

## 2023-03-07 RX ADMIN — THIAMINE HYDROCHLORIDE 400 MG: 100 INJECTION, SOLUTION INTRAMUSCULAR; INTRAVENOUS at 18:08

## 2023-03-07 RX ADMIN — INSULIN LISPRO 5 UNITS: 100 INJECTION, SOLUTION INTRAVENOUS; SUBCUTANEOUS at 18:13

## 2023-03-07 RX ADMIN — DIBASIC SODIUM PHOSPHATE, MONOBASIC POTASSIUM PHOSPHATE AND MONOBASIC SODIUM PHOSPHATE 1 TABLET: 852; 155; 130 TABLET ORAL at 09:19

## 2023-03-07 RX ADMIN — INSULIN LISPRO 4 UNITS: 100 INJECTION, SOLUTION INTRAVENOUS; SUBCUTANEOUS at 18:20

## 2023-03-07 RX ADMIN — HEPARIN SODIUM 5000 UNITS: 5000 INJECTION INTRAVENOUS; SUBCUTANEOUS at 14:43

## 2023-03-07 ASSESSMENT — PAIN DESCRIPTION - LOCATION
LOCATION: ABDOMEN

## 2023-03-07 ASSESSMENT — PAIN SCALES - GENERAL
PAINLEVEL_OUTOF10: 7
PAINLEVEL_OUTOF10: 0
PAINLEVEL_OUTOF10: 7
PAINLEVEL_OUTOF10: 6
PAINLEVEL_OUTOF10: 5
PAINLEVEL_OUTOF10: 4

## 2023-03-07 ASSESSMENT — PAIN DESCRIPTION - ORIENTATION
ORIENTATION: MID
ORIENTATION: ANTERIOR
ORIENTATION: MID

## 2023-03-07 ASSESSMENT — PAIN DESCRIPTION - DESCRIPTORS
DESCRIPTORS: CRAMPING;ACHING
DESCRIPTORS: CRAMPING;DISCOMFORT
DESCRIPTORS: ACHING
DESCRIPTORS: CRAMPING;DISCOMFORT
DESCRIPTORS: DISCOMFORT;CRAMPING

## 2023-03-08 LAB
ALBUMIN SERPL-MCNC: 2 G/DL (ref 3.4–5)
ALBUMIN/GLOB SERPL: 0.6 (ref 0.8–1.7)
ALP SERPL-CCNC: 42 U/L (ref 45–117)
ALT SERPL-CCNC: 16 U/L (ref 16–61)
ANION GAP SERPL CALC-SCNC: 4 MMOL/L (ref 3–18)
AST SERPL-CCNC: 19 U/L (ref 10–38)
BILIRUB SERPL-MCNC: 1.3 MG/DL (ref 0.2–1)
BUN SERPL-MCNC: 24 MG/DL (ref 7–18)
BUN/CREAT SERPL: 24 (ref 12–20)
CALCIUM SERPL-MCNC: 7.1 MG/DL (ref 8.5–10.1)
CHLORIDE SERPL-SCNC: 110 MMOL/L (ref 100–111)
CO2 SERPL-SCNC: 26 MMOL/L (ref 21–32)
CREAT SERPL-MCNC: 1 MG/DL (ref 0.6–1.3)
ERYTHROCYTE [DISTWIDTH] IN BLOOD BY AUTOMATED COUNT: 15 % (ref 11.6–14.5)
GLOBULIN SER CALC-MCNC: 3.2 G/DL (ref 2–4)
GLUCOSE BLD STRIP.AUTO-MCNC: 180 MG/DL (ref 70–110)
GLUCOSE BLD STRIP.AUTO-MCNC: 194 MG/DL (ref 70–110)
GLUCOSE BLD STRIP.AUTO-MCNC: 258 MG/DL (ref 70–110)
GLUCOSE BLD STRIP.AUTO-MCNC: 81 MG/DL (ref 70–110)
GLUCOSE SERPL-MCNC: 81 MG/DL (ref 74–99)
HCT VFR BLD AUTO: 26 % (ref 36–48)
HGB BLD-MCNC: 8.5 G/DL (ref 13–16)
MCH RBC QN AUTO: 27.8 PG (ref 24–34)
MCHC RBC AUTO-ENTMCNC: 32.7 G/DL (ref 31–37)
MCV RBC AUTO: 85 FL (ref 78–100)
NRBC # BLD: 0 K/UL (ref 0–0.01)
NRBC BLD-RTO: 0 PER 100 WBC
PLATELET # BLD AUTO: 165 K/UL (ref 135–420)
PMV BLD AUTO: 9.2 FL (ref 9.2–11.8)
POTASSIUM SERPL-SCNC: 3.4 MMOL/L (ref 3.5–5.5)
PROT SERPL-MCNC: 5.2 G/DL (ref 6.4–8.2)
RBC # BLD AUTO: 3.06 M/UL (ref 4.35–5.65)
SODIUM SERPL-SCNC: 140 MMOL/L (ref 136–145)
WBC # BLD AUTO: 7.7 K/UL (ref 4.6–13.2)

## 2023-03-08 PROCEDURE — 6370000000 HC RX 637 (ALT 250 FOR IP): Performed by: STUDENT IN AN ORGANIZED HEALTH CARE EDUCATION/TRAINING PROGRAM

## 2023-03-08 PROCEDURE — 6370000000 HC RX 637 (ALT 250 FOR IP): Performed by: INTERNAL MEDICINE

## 2023-03-08 PROCEDURE — 2140000001 HC CVICU INTERMEDIATE R&B

## 2023-03-08 PROCEDURE — 6360000002 HC RX W HCPCS: Performed by: INTERNAL MEDICINE

## 2023-03-08 PROCEDURE — 99232 SBSQ HOSP IP/OBS MODERATE 35: CPT | Performed by: INTERNAL MEDICINE

## 2023-03-08 PROCEDURE — 2700000000 HC OXYGEN THERAPY PER DAY

## 2023-03-08 PROCEDURE — 94761 N-INVAS EAR/PLS OXIMETRY MLT: CPT

## 2023-03-08 PROCEDURE — 36415 COLL VENOUS BLD VENIPUNCTURE: CPT

## 2023-03-08 PROCEDURE — 6360000002 HC RX W HCPCS: Performed by: STUDENT IN AN ORGANIZED HEALTH CARE EDUCATION/TRAINING PROGRAM

## 2023-03-08 PROCEDURE — 6360000002 HC RX W HCPCS

## 2023-03-08 PROCEDURE — C9113 INJ PANTOPRAZOLE SODIUM, VIA: HCPCS | Performed by: STUDENT IN AN ORGANIZED HEALTH CARE EDUCATION/TRAINING PROGRAM

## 2023-03-08 PROCEDURE — 82962 GLUCOSE BLOOD TEST: CPT

## 2023-03-08 PROCEDURE — 80053 COMPREHEN METABOLIC PANEL: CPT

## 2023-03-08 PROCEDURE — 2580000003 HC RX 258: Performed by: STUDENT IN AN ORGANIZED HEALTH CARE EDUCATION/TRAINING PROGRAM

## 2023-03-08 PROCEDURE — 2580000003 HC RX 258: Performed by: INTERNAL MEDICINE

## 2023-03-08 PROCEDURE — 85027 COMPLETE CBC AUTOMATED: CPT

## 2023-03-08 RX ORDER — TRAMADOL HYDROCHLORIDE 50 MG/1
50 TABLET ORAL EVERY 6 HOURS PRN
Status: DISCONTINUED | OUTPATIENT
Start: 2023-03-08 | End: 2023-03-22 | Stop reason: HOSPADM

## 2023-03-08 RX ORDER — INSULIN LISPRO 100 [IU]/ML
3 INJECTION, SOLUTION INTRAVENOUS; SUBCUTANEOUS
Status: DISCONTINUED | OUTPATIENT
Start: 2023-03-08 | End: 2023-03-17

## 2023-03-08 RX ORDER — FOLIC ACID 1 MG/1
1 TABLET ORAL DAILY
Status: DISCONTINUED | OUTPATIENT
Start: 2023-03-08 | End: 2023-03-22 | Stop reason: HOSPADM

## 2023-03-08 RX ORDER — MULTIVITAMIN WITH IRON
1 TABLET ORAL DAILY
Status: DISCONTINUED | OUTPATIENT
Start: 2023-03-08 | End: 2023-03-22 | Stop reason: HOSPADM

## 2023-03-08 RX ADMIN — FOLIC ACID 1 MG: 1 TABLET ORAL at 16:17

## 2023-03-08 RX ADMIN — INSULIN LISPRO 3 UNITS: 100 INJECTION, SOLUTION INTRAVENOUS; SUBCUTANEOUS at 17:22

## 2023-03-08 RX ADMIN — CARVEDILOL 3.12 MG: 3.12 TABLET, FILM COATED ORAL at 09:38

## 2023-03-08 RX ADMIN — INSULIN GLARGINE 10 UNITS: 100 INJECTION, SOLUTION SUBCUTANEOUS at 21:29

## 2023-03-08 RX ADMIN — ACETAMINOPHEN 650 MG: 325 TABLET, FILM COATED ORAL at 00:22

## 2023-03-08 RX ADMIN — THERA TABS 1 TABLET: TAB at 16:17

## 2023-03-08 RX ADMIN — DIBASIC SODIUM PHOSPHATE, MONOBASIC POTASSIUM PHOSPHATE AND MONOBASIC SODIUM PHOSPHATE 1 TABLET: 852; 155; 130 TABLET ORAL at 09:36

## 2023-03-08 RX ADMIN — CARVEDILOL 3.12 MG: 3.12 TABLET, FILM COATED ORAL at 16:17

## 2023-03-08 RX ADMIN — DIBASIC SODIUM PHOSPHATE, MONOBASIC POTASSIUM PHOSPHATE AND MONOBASIC SODIUM PHOSPHATE 1 TABLET: 852; 155; 130 TABLET ORAL at 21:39

## 2023-03-08 RX ADMIN — SODIUM CHLORIDE, PRESERVATIVE FREE 10 ML: 5 INJECTION INTRAVENOUS at 09:43

## 2023-03-08 RX ADMIN — TRAMADOL HYDROCHLORIDE 50 MG: 50 TABLET, COATED ORAL at 21:38

## 2023-03-08 RX ADMIN — THIAMINE HYDROCHLORIDE 400 MG: 100 INJECTION, SOLUTION INTRAMUSCULAR; INTRAVENOUS at 16:19

## 2023-03-08 RX ADMIN — MORPHINE SULFATE 2 MG: 2 INJECTION, SOLUTION INTRAMUSCULAR; INTRAVENOUS at 12:15

## 2023-03-08 RX ADMIN — INSULIN LISPRO 8 UNITS: 100 INJECTION, SOLUTION INTRAVENOUS; SUBCUTANEOUS at 17:20

## 2023-03-08 RX ADMIN — HEPARIN SODIUM 5000 UNITS: 5000 INJECTION INTRAVENOUS; SUBCUTANEOUS at 05:58

## 2023-03-08 RX ADMIN — SENNOSIDES AND DOCUSATE SODIUM 2 TABLET: 50; 8.6 TABLET ORAL at 09:36

## 2023-03-08 RX ADMIN — POTASSIUM BICARBONATE 50 MEQ: 978 TABLET, EFFERVESCENT ORAL at 09:37

## 2023-03-08 RX ADMIN — HEPARIN SODIUM 5000 UNITS: 5000 INJECTION INTRAVENOUS; SUBCUTANEOUS at 16:17

## 2023-03-08 RX ADMIN — SODIUM CHLORIDE, PRESERVATIVE FREE 10 ML: 5 INJECTION INTRAVENOUS at 21:39

## 2023-03-08 RX ADMIN — MORPHINE SULFATE 2 MG: 2 INJECTION, SOLUTION INTRAMUSCULAR; INTRAVENOUS at 09:37

## 2023-03-08 RX ADMIN — PANTOPRAZOLE SODIUM 40 MG: 40 INJECTION, POWDER, FOR SOLUTION INTRAVENOUS at 09:40

## 2023-03-08 RX ADMIN — TRAMADOL HYDROCHLORIDE 50 MG: 50 TABLET, COATED ORAL at 16:17

## 2023-03-08 RX ADMIN — HEPARIN SODIUM 5000 UNITS: 5000 INJECTION INTRAVENOUS; SUBCUTANEOUS at 21:38

## 2023-03-08 ASSESSMENT — PAIN SCALES - GENERAL
PAINLEVEL_OUTOF10: 5
PAINLEVEL_OUTOF10: 0
PAINLEVEL_OUTOF10: 7
PAINLEVEL_OUTOF10: 6
PAINLEVEL_OUTOF10: 6

## 2023-03-08 ASSESSMENT — PAIN DESCRIPTION - LOCATION
LOCATION: ABDOMEN

## 2023-03-08 ASSESSMENT — PAIN DESCRIPTION - DESCRIPTORS
DESCRIPTORS: ACHING;CRAMPING
DESCRIPTORS: ACHING
DESCRIPTORS: CRAMPING
DESCRIPTORS: CRAMPING

## 2023-03-08 ASSESSMENT — PAIN DESCRIPTION - ORIENTATION
ORIENTATION: MID
ORIENTATION: ANTERIOR
ORIENTATION: MID

## 2023-03-09 ENCOUNTER — APPOINTMENT (OUTPATIENT)
Facility: HOSPITAL | Age: 68
DRG: 438 | End: 2023-03-09
Payer: MEDICARE

## 2023-03-09 LAB
ALBUMIN SERPL-MCNC: 2 G/DL (ref 3.4–5)
ALBUMIN SERPL-MCNC: 2 G/DL (ref 3.4–5)
ALBUMIN/GLOB SERPL: 0.6 (ref 0.8–1.7)
ALBUMIN/GLOB SERPL: 0.6 (ref 0.8–1.7)
ALP SERPL-CCNC: 49 U/L (ref 45–117)
ALP SERPL-CCNC: 49 U/L (ref 45–117)
ALT SERPL-CCNC: 15 U/L (ref 16–61)
ALT SERPL-CCNC: 17 U/L (ref 16–61)
ANION GAP SERPL CALC-SCNC: 4 MMOL/L (ref 3–18)
ANION GAP SERPL CALC-SCNC: 4 MMOL/L (ref 3–18)
AST SERPL-CCNC: 19 U/L (ref 10–38)
AST SERPL-CCNC: 21 U/L (ref 10–38)
BILIRUB SERPL-MCNC: 1.2 MG/DL (ref 0.2–1)
BILIRUB SERPL-MCNC: 1.5 MG/DL (ref 0.2–1)
BUN SERPL-MCNC: 17 MG/DL (ref 7–18)
BUN SERPL-MCNC: 18 MG/DL (ref 7–18)
BUN/CREAT SERPL: 16 (ref 12–20)
BUN/CREAT SERPL: 17 (ref 12–20)
CALCIUM SERPL-MCNC: 7.1 MG/DL (ref 8.5–10.1)
CALCIUM SERPL-MCNC: 7.4 MG/DL (ref 8.5–10.1)
CHLORIDE SERPL-SCNC: 103 MMOL/L (ref 100–111)
CHLORIDE SERPL-SCNC: 104 MMOL/L (ref 100–111)
CO2 SERPL-SCNC: 27 MMOL/L (ref 21–32)
CO2 SERPL-SCNC: 29 MMOL/L (ref 21–32)
CREAT SERPL-MCNC: 1.06 MG/DL (ref 0.6–1.3)
CREAT SERPL-MCNC: 1.08 MG/DL (ref 0.6–1.3)
ERYTHROCYTE [DISTWIDTH] IN BLOOD BY AUTOMATED COUNT: 15 % (ref 11.6–14.5)
GLOBULIN SER CALC-MCNC: 3.5 G/DL (ref 2–4)
GLOBULIN SER CALC-MCNC: 3.6 G/DL (ref 2–4)
GLUCOSE BLD STRIP.AUTO-MCNC: 121 MG/DL (ref 70–110)
GLUCOSE BLD STRIP.AUTO-MCNC: 143 MG/DL (ref 70–110)
GLUCOSE BLD STRIP.AUTO-MCNC: 150 MG/DL (ref 70–110)
GLUCOSE BLD STRIP.AUTO-MCNC: 160 MG/DL (ref 70–110)
GLUCOSE SERPL-MCNC: 115 MG/DL (ref 74–99)
GLUCOSE SERPL-MCNC: 161 MG/DL (ref 74–99)
HCT VFR BLD AUTO: 26.4 % (ref 36–48)
HGB BLD-MCNC: 8.5 G/DL (ref 13–16)
MCH RBC QN AUTO: 27.7 PG (ref 24–34)
MCHC RBC AUTO-ENTMCNC: 32.2 G/DL (ref 31–37)
MCV RBC AUTO: 86 FL (ref 78–100)
NRBC # BLD: 0 K/UL (ref 0–0.01)
NRBC BLD-RTO: 0 PER 100 WBC
PHOSPHATE SERPL-MCNC: 2.3 MG/DL (ref 2.5–4.9)
PLATELET # BLD AUTO: 198 K/UL (ref 135–420)
PMV BLD AUTO: 9.5 FL (ref 9.2–11.8)
POTASSIUM SERPL-SCNC: 3.4 MMOL/L (ref 3.5–5.5)
POTASSIUM SERPL-SCNC: 3.5 MMOL/L (ref 3.5–5.5)
PROT SERPL-MCNC: 5.5 G/DL (ref 6.4–8.2)
PROT SERPL-MCNC: 5.6 G/DL (ref 6.4–8.2)
RBC # BLD AUTO: 3.07 M/UL (ref 4.35–5.65)
SODIUM SERPL-SCNC: 135 MMOL/L (ref 136–145)
SODIUM SERPL-SCNC: 136 MMOL/L (ref 136–145)
WBC # BLD AUTO: 9 K/UL (ref 4.6–13.2)

## 2023-03-09 PROCEDURE — 99232 SBSQ HOSP IP/OBS MODERATE 35: CPT | Performed by: INTERNAL MEDICINE

## 2023-03-09 PROCEDURE — 6370000000 HC RX 637 (ALT 250 FOR IP): Performed by: STUDENT IN AN ORGANIZED HEALTH CARE EDUCATION/TRAINING PROGRAM

## 2023-03-09 PROCEDURE — 80053 COMPREHEN METABOLIC PANEL: CPT

## 2023-03-09 PROCEDURE — 6360000002 HC RX W HCPCS

## 2023-03-09 PROCEDURE — 85027 COMPLETE CBC AUTOMATED: CPT

## 2023-03-09 PROCEDURE — 2580000003 HC RX 258: Performed by: INTERNAL MEDICINE

## 2023-03-09 PROCEDURE — C9113 INJ PANTOPRAZOLE SODIUM, VIA: HCPCS | Performed by: STUDENT IN AN ORGANIZED HEALTH CARE EDUCATION/TRAINING PROGRAM

## 2023-03-09 PROCEDURE — 94761 N-INVAS EAR/PLS OXIMETRY MLT: CPT

## 2023-03-09 PROCEDURE — 6360000002 HC RX W HCPCS: Performed by: STUDENT IN AN ORGANIZED HEALTH CARE EDUCATION/TRAINING PROGRAM

## 2023-03-09 PROCEDURE — 87040 BLOOD CULTURE FOR BACTERIA: CPT

## 2023-03-09 PROCEDURE — 6370000000 HC RX 637 (ALT 250 FOR IP): Performed by: INTERNAL MEDICINE

## 2023-03-09 PROCEDURE — 82962 GLUCOSE BLOOD TEST: CPT

## 2023-03-09 PROCEDURE — 71045 X-RAY EXAM CHEST 1 VIEW: CPT

## 2023-03-09 PROCEDURE — 6360000002 HC RX W HCPCS: Performed by: INTERNAL MEDICINE

## 2023-03-09 PROCEDURE — 2580000003 HC RX 258: Performed by: STUDENT IN AN ORGANIZED HEALTH CARE EDUCATION/TRAINING PROGRAM

## 2023-03-09 PROCEDURE — 97535 SELF CARE MNGMENT TRAINING: CPT

## 2023-03-09 PROCEDURE — 84100 ASSAY OF PHOSPHORUS: CPT

## 2023-03-09 PROCEDURE — 36415 COLL VENOUS BLD VENIPUNCTURE: CPT

## 2023-03-09 PROCEDURE — 2140000001 HC CVICU INTERMEDIATE R&B

## 2023-03-09 RX ORDER — GUAIFENESIN 600 MG/1
600 TABLET, EXTENDED RELEASE ORAL 2 TIMES DAILY
Status: DISCONTINUED | OUTPATIENT
Start: 2023-03-09 | End: 2023-03-14

## 2023-03-09 RX ORDER — METOCLOPRAMIDE HYDROCHLORIDE 5 MG/5ML
5 SOLUTION ORAL
Status: DISCONTINUED | OUTPATIENT
Start: 2023-03-09 | End: 2023-03-22 | Stop reason: HOSPADM

## 2023-03-09 RX ADMIN — FOLIC ACID 1 MG: 1 TABLET ORAL at 08:34

## 2023-03-09 RX ADMIN — INSULIN LISPRO 3 UNITS: 100 INJECTION, SOLUTION INTRAVENOUS; SUBCUTANEOUS at 08:29

## 2023-03-09 RX ADMIN — CARVEDILOL 3.12 MG: 3.12 TABLET, FILM COATED ORAL at 18:45

## 2023-03-09 RX ADMIN — INSULIN LISPRO 3 UNITS: 100 INJECTION, SOLUTION INTRAVENOUS; SUBCUTANEOUS at 12:33

## 2023-03-09 RX ADMIN — METOCLOPRAMIDE HYDROCHLORIDE 5 MG: 5 SOLUTION ORAL at 12:36

## 2023-03-09 RX ADMIN — THIAMINE HYDROCHLORIDE 400 MG: 100 INJECTION, SOLUTION INTRAMUSCULAR; INTRAVENOUS at 18:46

## 2023-03-09 RX ADMIN — HEPARIN SODIUM 5000 UNITS: 5000 INJECTION INTRAVENOUS; SUBCUTANEOUS at 21:43

## 2023-03-09 RX ADMIN — THERA TABS 1 TABLET: TAB at 08:34

## 2023-03-09 RX ADMIN — METOCLOPRAMIDE HYDROCHLORIDE 5 MG: 5 SOLUTION ORAL at 16:03

## 2023-03-09 RX ADMIN — PANTOPRAZOLE SODIUM 40 MG: 40 INJECTION, POWDER, FOR SOLUTION INTRAVENOUS at 08:37

## 2023-03-09 RX ADMIN — CARVEDILOL 3.12 MG: 3.12 TABLET, FILM COATED ORAL at 08:33

## 2023-03-09 RX ADMIN — DIBASIC SODIUM PHOSPHATE, MONOBASIC POTASSIUM PHOSPHATE AND MONOBASIC SODIUM PHOSPHATE 1 TABLET: 852; 155; 130 TABLET ORAL at 08:34

## 2023-03-09 RX ADMIN — DIBASIC SODIUM PHOSPHATE, MONOBASIC POTASSIUM PHOSPHATE AND MONOBASIC SODIUM PHOSPHATE 1 TABLET: 852; 155; 130 TABLET ORAL at 21:43

## 2023-03-09 RX ADMIN — INSULIN LISPRO 3 UNITS: 100 INJECTION, SOLUTION INTRAVENOUS; SUBCUTANEOUS at 17:00

## 2023-03-09 RX ADMIN — TRAMADOL HYDROCHLORIDE 50 MG: 50 TABLET, COATED ORAL at 08:32

## 2023-03-09 RX ADMIN — HEPARIN SODIUM 5000 UNITS: 5000 INJECTION INTRAVENOUS; SUBCUTANEOUS at 15:59

## 2023-03-09 RX ADMIN — SODIUM CHLORIDE, PRESERVATIVE FREE 5 ML: 5 INJECTION INTRAVENOUS at 09:05

## 2023-03-09 RX ADMIN — SENNOSIDES AND DOCUSATE SODIUM 2 TABLET: 50; 8.6 TABLET ORAL at 08:34

## 2023-03-09 RX ADMIN — INSULIN GLARGINE 10 UNITS: 100 INJECTION, SOLUTION SUBCUTANEOUS at 21:40

## 2023-03-09 RX ADMIN — ACETAMINOPHEN 650 MG: 325 TABLET, FILM COATED ORAL at 21:43

## 2023-03-09 RX ADMIN — POTASSIUM BICARBONATE 40 MEQ: 782 TABLET, EFFERVESCENT ORAL at 08:34

## 2023-03-09 RX ADMIN — SODIUM CHLORIDE, PRESERVATIVE FREE 10 ML: 5 INJECTION INTRAVENOUS at 21:44

## 2023-03-09 RX ADMIN — HEPARIN SODIUM 5000 UNITS: 5000 INJECTION INTRAVENOUS; SUBCUTANEOUS at 06:33

## 2023-03-09 ASSESSMENT — PAIN SCALES - GENERAL
PAINLEVEL_OUTOF10: 0
PAINLEVEL_OUTOF10: 0
PAINLEVEL_OUTOF10: 7
PAINLEVEL_OUTOF10: 3
PAINLEVEL_OUTOF10: 7

## 2023-03-09 ASSESSMENT — PAIN DESCRIPTION - LOCATION
LOCATION: ABDOMEN
LOCATION: ABDOMEN

## 2023-03-09 ASSESSMENT — PAIN DESCRIPTION - DESCRIPTORS
DESCRIPTORS: ACHING
DESCRIPTORS: ACHING

## 2023-03-10 ENCOUNTER — HOME HEALTH ADMISSION (OUTPATIENT)
Age: 68
End: 2023-03-10

## 2023-03-10 LAB
ALBUMIN SERPL-MCNC: 2 G/DL (ref 3.4–5)
ALBUMIN/GLOB SERPL: 0.6 (ref 0.8–1.7)
ALP SERPL-CCNC: 53 U/L (ref 45–117)
ALT SERPL-CCNC: 16 U/L (ref 16–61)
ANION GAP SERPL CALC-SCNC: 5 MMOL/L (ref 3–18)
AST SERPL-CCNC: 20 U/L (ref 10–38)
BILIRUB SERPL-MCNC: 1.6 MG/DL (ref 0.2–1)
BUN SERPL-MCNC: 16 MG/DL (ref 7–18)
BUN/CREAT SERPL: 15 (ref 12–20)
CALCIUM SERPL-MCNC: 7.3 MG/DL (ref 8.5–10.1)
CHLORIDE SERPL-SCNC: 103 MMOL/L (ref 100–111)
CO2 SERPL-SCNC: 26 MMOL/L (ref 21–32)
CREAT SERPL-MCNC: 1.09 MG/DL (ref 0.6–1.3)
ERYTHROCYTE [DISTWIDTH] IN BLOOD BY AUTOMATED COUNT: 15.1 % (ref 11.6–14.5)
GLOBULIN SER CALC-MCNC: 3.5 G/DL (ref 2–4)
GLUCOSE BLD STRIP.AUTO-MCNC: 100 MG/DL (ref 70–110)
GLUCOSE BLD STRIP.AUTO-MCNC: 85 MG/DL (ref 70–110)
GLUCOSE BLD STRIP.AUTO-MCNC: 97 MG/DL (ref 70–110)
GLUCOSE BLD STRIP.AUTO-MCNC: 99 MG/DL (ref 70–110)
GLUCOSE SERPL-MCNC: 116 MG/DL (ref 74–99)
HCT VFR BLD AUTO: 24.8 % (ref 36–48)
HGB BLD-MCNC: 8.1 G/DL (ref 13–16)
LIPASE SERPL-CCNC: 31 U/L (ref 73–393)
MCH RBC QN AUTO: 27.6 PG (ref 24–34)
MCHC RBC AUTO-ENTMCNC: 32.7 G/DL (ref 31–37)
MCV RBC AUTO: 84.4 FL (ref 78–100)
NRBC # BLD: 0 K/UL (ref 0–0.01)
NRBC BLD-RTO: 0 PER 100 WBC
PLATELET # BLD AUTO: 192 K/UL (ref 135–420)
PMV BLD AUTO: 9.7 FL (ref 9.2–11.8)
POTASSIUM SERPL-SCNC: 3.3 MMOL/L (ref 3.5–5.5)
PROCALCITONIN SERPL-MCNC: 0.65 NG/ML
PROT SERPL-MCNC: 5.5 G/DL (ref 6.4–8.2)
RBC # BLD AUTO: 2.94 M/UL (ref 4.35–5.65)
SODIUM SERPL-SCNC: 134 MMOL/L (ref 136–145)
WBC # BLD AUTO: 9.3 K/UL (ref 4.6–13.2)

## 2023-03-10 PROCEDURE — C9113 INJ PANTOPRAZOLE SODIUM, VIA: HCPCS | Performed by: STUDENT IN AN ORGANIZED HEALTH CARE EDUCATION/TRAINING PROGRAM

## 2023-03-10 PROCEDURE — 83690 ASSAY OF LIPASE: CPT

## 2023-03-10 PROCEDURE — 6360000002 HC RX W HCPCS: Performed by: STUDENT IN AN ORGANIZED HEALTH CARE EDUCATION/TRAINING PROGRAM

## 2023-03-10 PROCEDURE — 6360000002 HC RX W HCPCS: Performed by: INTERNAL MEDICINE

## 2023-03-10 PROCEDURE — 6370000000 HC RX 637 (ALT 250 FOR IP): Performed by: INTERNAL MEDICINE

## 2023-03-10 PROCEDURE — 2580000003 HC RX 258: Performed by: INTERNAL MEDICINE

## 2023-03-10 PROCEDURE — 94761 N-INVAS EAR/PLS OXIMETRY MLT: CPT

## 2023-03-10 PROCEDURE — 84145 PROCALCITONIN (PCT): CPT

## 2023-03-10 PROCEDURE — 6360000002 HC RX W HCPCS

## 2023-03-10 PROCEDURE — 2580000003 HC RX 258: Performed by: STUDENT IN AN ORGANIZED HEALTH CARE EDUCATION/TRAINING PROGRAM

## 2023-03-10 PROCEDURE — 85027 COMPLETE CBC AUTOMATED: CPT

## 2023-03-10 PROCEDURE — 87186 SC STD MICRODIL/AGAR DIL: CPT

## 2023-03-10 PROCEDURE — 6370000000 HC RX 637 (ALT 250 FOR IP): Performed by: STUDENT IN AN ORGANIZED HEALTH CARE EDUCATION/TRAINING PROGRAM

## 2023-03-10 PROCEDURE — 36415 COLL VENOUS BLD VENIPUNCTURE: CPT

## 2023-03-10 PROCEDURE — 97116 GAIT TRAINING THERAPY: CPT

## 2023-03-10 PROCEDURE — 2700000000 HC OXYGEN THERAPY PER DAY

## 2023-03-10 PROCEDURE — 80053 COMPREHEN METABOLIC PANEL: CPT

## 2023-03-10 PROCEDURE — 87077 CULTURE AEROBIC IDENTIFY: CPT

## 2023-03-10 PROCEDURE — 99232 SBSQ HOSP IP/OBS MODERATE 35: CPT | Performed by: INTERNAL MEDICINE

## 2023-03-10 PROCEDURE — 1100000000 HC RM PRIVATE

## 2023-03-10 PROCEDURE — 82962 GLUCOSE BLOOD TEST: CPT

## 2023-03-10 PROCEDURE — 97530 THERAPEUTIC ACTIVITIES: CPT

## 2023-03-10 PROCEDURE — 99024 POSTOP FOLLOW-UP VISIT: CPT | Performed by: INTERNAL MEDICINE

## 2023-03-10 PROCEDURE — 87070 CULTURE OTHR SPECIMN AEROBIC: CPT

## 2023-03-10 RX ORDER — POTASSIUM CHLORIDE 7.45 MG/ML
10 INJECTION INTRAVENOUS
Status: COMPLETED | OUTPATIENT
Start: 2023-03-10 | End: 2023-03-10

## 2023-03-10 RX ADMIN — PIPERACILLIN AND TAZOBACTAM 3375 MG: 3; .375 INJECTION, POWDER, LYOPHILIZED, FOR SOLUTION INTRAVENOUS at 22:08

## 2023-03-10 RX ADMIN — METOCLOPRAMIDE HYDROCHLORIDE 5 MG: 5 SOLUTION ORAL at 16:28

## 2023-03-10 RX ADMIN — INSULIN LISPRO 3 UNITS: 100 INJECTION, SOLUTION INTRAVENOUS; SUBCUTANEOUS at 09:11

## 2023-03-10 RX ADMIN — INSULIN LISPRO 3 UNITS: 100 INJECTION, SOLUTION INTRAVENOUS; SUBCUTANEOUS at 16:26

## 2023-03-10 RX ADMIN — HEPARIN SODIUM 5000 UNITS: 5000 INJECTION INTRAVENOUS; SUBCUTANEOUS at 22:08

## 2023-03-10 RX ADMIN — ACETAMINOPHEN 650 MG: 325 TABLET, FILM COATED ORAL at 11:28

## 2023-03-10 RX ADMIN — POTASSIUM CHLORIDE 10 MEQ: 7.45 INJECTION INTRAVENOUS at 12:38

## 2023-03-10 RX ADMIN — GUAIFENESIN 600 MG: 600 TABLET, EXTENDED RELEASE ORAL at 22:08

## 2023-03-10 RX ADMIN — PANTOPRAZOLE SODIUM 40 MG: 40 INJECTION, POWDER, FOR SOLUTION INTRAVENOUS at 09:10

## 2023-03-10 RX ADMIN — ACETAMINOPHEN 650 MG: 325 TABLET, FILM COATED ORAL at 04:15

## 2023-03-10 RX ADMIN — SODIUM CHLORIDE, PRESERVATIVE FREE 10 ML: 5 INJECTION INTRAVENOUS at 11:52

## 2023-03-10 RX ADMIN — PIPERACILLIN AND TAZOBACTAM 4500 MG: 4; .5 INJECTION, POWDER, FOR SOLUTION INTRAVENOUS at 00:19

## 2023-03-10 RX ADMIN — METOCLOPRAMIDE HYDROCHLORIDE 5 MG: 5 SOLUTION ORAL at 06:04

## 2023-03-10 RX ADMIN — POTASSIUM CHLORIDE 10 MEQ: 7.45 INJECTION INTRAVENOUS at 14:26

## 2023-03-10 RX ADMIN — VANCOMYCIN HYDROCHLORIDE 1500 MG: 10 INJECTION, POWDER, LYOPHILIZED, FOR SOLUTION INTRAVENOUS at 18:49

## 2023-03-10 RX ADMIN — TRAMADOL HYDROCHLORIDE 50 MG: 50 TABLET, COATED ORAL at 18:14

## 2023-03-10 RX ADMIN — PIPERACILLIN AND TAZOBACTAM 3375 MG: 3; .375 INJECTION, POWDER, LYOPHILIZED, FOR SOLUTION INTRAVENOUS at 16:20

## 2023-03-10 RX ADMIN — VANCOMYCIN HYDROCHLORIDE 1750 MG: 10 INJECTION, POWDER, LYOPHILIZED, FOR SOLUTION INTRAVENOUS at 01:25

## 2023-03-10 RX ADMIN — POTASSIUM CHLORIDE 10 MEQ: 7.45 INJECTION INTRAVENOUS at 11:31

## 2023-03-10 RX ADMIN — INSULIN GLARGINE 10 UNITS: 100 INJECTION, SOLUTION SUBCUTANEOUS at 22:06

## 2023-03-10 RX ADMIN — GUAIFENESIN 600 MG: 600 TABLET, EXTENDED RELEASE ORAL at 09:20

## 2023-03-10 RX ADMIN — FOLIC ACID 1 MG: 1 TABLET ORAL at 09:10

## 2023-03-10 RX ADMIN — SODIUM CHLORIDE, PRESERVATIVE FREE 10 ML: 5 INJECTION INTRAVENOUS at 22:08

## 2023-03-10 RX ADMIN — HEPARIN SODIUM 5000 UNITS: 5000 INJECTION INTRAVENOUS; SUBCUTANEOUS at 14:28

## 2023-03-10 RX ADMIN — TRAMADOL HYDROCHLORIDE 50 MG: 50 TABLET, COATED ORAL at 11:28

## 2023-03-10 RX ADMIN — CARVEDILOL 3.12 MG: 3.12 TABLET, FILM COATED ORAL at 16:29

## 2023-03-10 RX ADMIN — PIPERACILLIN AND TAZOBACTAM 3375 MG: 3; .375 INJECTION, POWDER, LYOPHILIZED, FOR SOLUTION INTRAVENOUS at 06:04

## 2023-03-10 RX ADMIN — CARVEDILOL 3.12 MG: 3.12 TABLET, FILM COATED ORAL at 09:10

## 2023-03-10 RX ADMIN — METOCLOPRAMIDE HYDROCHLORIDE 5 MG: 5 SOLUTION ORAL at 11:27

## 2023-03-10 RX ADMIN — THERA TABS 1 TABLET: TAB at 09:10

## 2023-03-10 RX ADMIN — HEPARIN SODIUM 5000 UNITS: 5000 INJECTION INTRAVENOUS; SUBCUTANEOUS at 06:04

## 2023-03-10 RX ADMIN — INSULIN LISPRO 3 UNITS: 100 INJECTION, SOLUTION INTRAVENOUS; SUBCUTANEOUS at 12:39

## 2023-03-10 RX ADMIN — GUAIFENESIN 600 MG: 600 TABLET, EXTENDED RELEASE ORAL at 00:18

## 2023-03-10 RX ADMIN — DIBASIC SODIUM PHOSPHATE, MONOBASIC POTASSIUM PHOSPHATE AND MONOBASIC SODIUM PHOSPHATE 1 TABLET: 852; 155; 130 TABLET ORAL at 09:10

## 2023-03-10 ASSESSMENT — PAIN SCALES - GENERAL
PAINLEVEL_OUTOF10: 0
PAINLEVEL_OUTOF10: 8
PAINLEVEL_OUTOF10: 0
PAINLEVEL_OUTOF10: 7
PAINLEVEL_OUTOF10: 2

## 2023-03-10 ASSESSMENT — PAIN DESCRIPTION - DESCRIPTORS
DESCRIPTORS: ACHING
DESCRIPTORS: ACHING

## 2023-03-10 ASSESSMENT — PAIN DESCRIPTION - LOCATION
LOCATION: ABDOMEN
LOCATION: ABDOMEN

## 2023-03-10 ASSESSMENT — PAIN - FUNCTIONAL ASSESSMENT
PAIN_FUNCTIONAL_ASSESSMENT: PREVENTS OR INTERFERES SOME ACTIVE ACTIVITIES AND ADLS
PAIN_FUNCTIONAL_ASSESSMENT: PREVENTS OR INTERFERES SOME ACTIVE ACTIVITIES AND ADLS

## 2023-03-10 ASSESSMENT — PAIN DESCRIPTION - ORIENTATION
ORIENTATION: ANTERIOR
ORIENTATION: MID;ANTERIOR

## 2023-03-10 NOTE — HOME CARE
Received home health referral for [facility] for SN,PT,OT;  spoke to patient in room, Verified demographics,explained [facility] services and answered all questions, patient provided with [facility] contact card; Patient states he will be discharging possibly this weekend to his mother-in-laws home with his spouse Zaria Books)  at [address] [place]; DME: Home O2 has been ordered by   thru [facility] and patient has already received Portable O2 delivered to his room; patient informed to call DME company once discharged to get O2 concentrator delivered to his address; [facility] referral updated ,[facility] will continue to follow for pending discharge this weekend. .SMOOTH GOFF LPN.

## 2023-03-11 ENCOUNTER — APPOINTMENT (OUTPATIENT)
Facility: HOSPITAL | Age: 68
DRG: 438 | End: 2023-03-11
Payer: MEDICARE

## 2023-03-11 PROBLEM — Z78.9 ALCOHOL USE: Status: ACTIVE | Noted: 2023-03-11

## 2023-03-11 PROBLEM — R14.0 ABDOMINAL DISTENTION: Status: ACTIVE | Noted: 2023-03-11

## 2023-03-11 PROBLEM — E87.6 HYPOKALEMIA: Status: ACTIVE | Noted: 2023-03-11

## 2023-03-11 LAB
ANION GAP SERPL CALC-SCNC: 6 MMOL/L (ref 3–18)
B PERT DNA SPEC QL NAA+PROBE: NOT DETECTED
BACTERIA SPEC CULT: NORMAL
BACTERIA SPEC CULT: NORMAL
BORDETELLA PARAPERTUSSIS BY PCR: NOT DETECTED
BUN SERPL-MCNC: 14 MG/DL (ref 7–18)
BUN/CREAT SERPL: 12 (ref 12–20)
C PNEUM DNA SPEC QL NAA+PROBE: NOT DETECTED
CALCIUM SERPL-MCNC: 7.1 MG/DL (ref 8.5–10.1)
CHLORIDE SERPL-SCNC: 105 MMOL/L (ref 100–111)
CO2 SERPL-SCNC: 25 MMOL/L (ref 21–32)
CREAT SERPL-MCNC: 1.13 MG/DL (ref 0.6–1.3)
FLUAV SUBTYP SPEC NAA+PROBE: NOT DETECTED
FLUBV RNA SPEC QL NAA+PROBE: NOT DETECTED
GLUCOSE BLD STRIP.AUTO-MCNC: 134 MG/DL (ref 70–110)
GLUCOSE BLD STRIP.AUTO-MCNC: 80 MG/DL (ref 70–110)
GLUCOSE BLD STRIP.AUTO-MCNC: 81 MG/DL (ref 70–110)
GLUCOSE SERPL-MCNC: 84 MG/DL (ref 74–99)
HADV DNA SPEC QL NAA+PROBE: NOT DETECTED
HCOV 229E RNA SPEC QL NAA+PROBE: NOT DETECTED
HCOV HKU1 RNA SPEC QL NAA+PROBE: NOT DETECTED
HCOV NL63 RNA SPEC QL NAA+PROBE: NOT DETECTED
HCOV OC43 RNA SPEC QL NAA+PROBE: NOT DETECTED
HMPV RNA SPEC QL NAA+PROBE: NOT DETECTED
HPIV1 RNA SPEC QL NAA+PROBE: NOT DETECTED
HPIV2 RNA SPEC QL NAA+PROBE: NOT DETECTED
HPIV3 RNA SPEC QL NAA+PROBE: NOT DETECTED
HPIV4 RNA SPEC QL NAA+PROBE: NOT DETECTED
LACTATE SERPL-SCNC: 1 MMOL/L (ref 0.4–2)
M PNEUMO DNA SPEC QL NAA+PROBE: NOT DETECTED
MAGNESIUM SERPL-MCNC: 1.7 MG/DL (ref 1.6–2.6)
POTASSIUM SERPL-SCNC: 2.9 MMOL/L (ref 3.5–5.5)
POTASSIUM SERPL-SCNC: 3.8 MMOL/L (ref 3.5–5.5)
RSV RNA SPEC QL NAA+PROBE: NOT DETECTED
RV+EV RNA SPEC QL NAA+PROBE: NOT DETECTED
SARS-COV-2 RNA RESP QL NAA+PROBE: NOT DETECTED
SERVICE CMNT-IMP: NORMAL
SODIUM SERPL-SCNC: 136 MMOL/L (ref 136–145)
VANCOMYCIN SERPL-MCNC: 13 UG/ML (ref 5–40)

## 2023-03-11 PROCEDURE — 2580000003 HC RX 258: Performed by: INTERNAL MEDICINE

## 2023-03-11 PROCEDURE — 83605 ASSAY OF LACTIC ACID: CPT

## 2023-03-11 PROCEDURE — 6360000002 HC RX W HCPCS: Performed by: INTERNAL MEDICINE

## 2023-03-11 PROCEDURE — C9113 INJ PANTOPRAZOLE SODIUM, VIA: HCPCS | Performed by: STUDENT IN AN ORGANIZED HEALTH CARE EDUCATION/TRAINING PROGRAM

## 2023-03-11 PROCEDURE — 94761 N-INVAS EAR/PLS OXIMETRY MLT: CPT

## 2023-03-11 PROCEDURE — 6370000000 HC RX 637 (ALT 250 FOR IP): Performed by: INTERNAL MEDICINE

## 2023-03-11 PROCEDURE — 2580000003 HC RX 258: Performed by: STUDENT IN AN ORGANIZED HEALTH CARE EDUCATION/TRAINING PROGRAM

## 2023-03-11 PROCEDURE — 80202 ASSAY OF VANCOMYCIN: CPT

## 2023-03-11 PROCEDURE — 6370000000 HC RX 637 (ALT 250 FOR IP): Performed by: STUDENT IN AN ORGANIZED HEALTH CARE EDUCATION/TRAINING PROGRAM

## 2023-03-11 PROCEDURE — 99233 SBSQ HOSP IP/OBS HIGH 50: CPT | Performed by: INTERNAL MEDICINE

## 2023-03-11 PROCEDURE — 83735 ASSAY OF MAGNESIUM: CPT

## 2023-03-11 PROCEDURE — 74018 RADEX ABDOMEN 1 VIEW: CPT

## 2023-03-11 PROCEDURE — 84132 ASSAY OF SERUM POTASSIUM: CPT

## 2023-03-11 PROCEDURE — 82962 GLUCOSE BLOOD TEST: CPT

## 2023-03-11 PROCEDURE — 74177 CT ABD & PELVIS W/CONTRAST: CPT

## 2023-03-11 PROCEDURE — 36415 COLL VENOUS BLD VENIPUNCTURE: CPT

## 2023-03-11 PROCEDURE — 1100000000 HC RM PRIVATE

## 2023-03-11 PROCEDURE — 6360000002 HC RX W HCPCS

## 2023-03-11 PROCEDURE — 6360000002 HC RX W HCPCS: Performed by: STUDENT IN AN ORGANIZED HEALTH CARE EDUCATION/TRAINING PROGRAM

## 2023-03-11 PROCEDURE — 6360000004 HC RX CONTRAST MEDICATION: Performed by: INTERNAL MEDICINE

## 2023-03-11 PROCEDURE — 0202U NFCT DS 22 TRGT SARS-COV-2: CPT

## 2023-03-11 RX ORDER — DEXTROSE MONOHYDRATE 50 MG/ML
INJECTION, SOLUTION INTRAVENOUS CONTINUOUS
Status: DISCONTINUED | OUTPATIENT
Start: 2023-03-11 | End: 2023-03-11

## 2023-03-11 RX ORDER — SODIUM CHLORIDE, SODIUM LACTATE, POTASSIUM CHLORIDE, CALCIUM CHLORIDE 600; 310; 30; 20 MG/100ML; MG/100ML; MG/100ML; MG/100ML
INJECTION, SOLUTION INTRAVENOUS CONTINUOUS
Status: DISCONTINUED | OUTPATIENT
Start: 2023-03-11 | End: 2023-03-15

## 2023-03-11 RX ORDER — INSULIN LISPRO 100 [IU]/ML
0-4 INJECTION, SOLUTION INTRAVENOUS; SUBCUTANEOUS EVERY 6 HOURS SCHEDULED
Status: DISCONTINUED | OUTPATIENT
Start: 2023-03-12 | End: 2023-03-14

## 2023-03-11 RX ADMIN — HEPARIN SODIUM 5000 UNITS: 5000 INJECTION INTRAVENOUS; SUBCUTANEOUS at 06:04

## 2023-03-11 RX ADMIN — PANTOPRAZOLE SODIUM 40 MG: 40 INJECTION, POWDER, FOR SOLUTION INTRAVENOUS at 08:47

## 2023-03-11 RX ADMIN — FOLIC ACID 1 MG: 1 TABLET ORAL at 08:43

## 2023-03-11 RX ADMIN — SODIUM CHLORIDE, PRESERVATIVE FREE 10 ML: 5 INJECTION INTRAVENOUS at 21:50

## 2023-03-11 RX ADMIN — VANCOMYCIN HYDROCHLORIDE 1500 MG: 10 INJECTION, POWDER, LYOPHILIZED, FOR SOLUTION INTRAVENOUS at 17:19

## 2023-03-11 RX ADMIN — THERA TABS 1 TABLET: TAB at 08:43

## 2023-03-11 RX ADMIN — DEXTROSE MONOHYDRATE: 50 INJECTION, SOLUTION INTRAVENOUS at 14:27

## 2023-03-11 RX ADMIN — IOPAMIDOL 80 ML: 612 INJECTION, SOLUTION INTRAVENOUS at 15:47

## 2023-03-11 RX ADMIN — CARVEDILOL 3.12 MG: 3.12 TABLET, FILM COATED ORAL at 08:43

## 2023-03-11 RX ADMIN — GUAIFENESIN 600 MG: 600 TABLET, EXTENDED RELEASE ORAL at 08:43

## 2023-03-11 RX ADMIN — SODIUM CHLORIDE, POTASSIUM CHLORIDE, SODIUM LACTATE AND CALCIUM CHLORIDE: 600; 310; 30; 20 INJECTION, SOLUTION INTRAVENOUS at 18:18

## 2023-03-11 RX ADMIN — PIPERACILLIN AND TAZOBACTAM 3375 MG: 3; .375 INJECTION, POWDER, LYOPHILIZED, FOR SOLUTION INTRAVENOUS at 21:45

## 2023-03-11 RX ADMIN — PIPERACILLIN AND TAZOBACTAM 3375 MG: 3; .375 INJECTION, POWDER, LYOPHILIZED, FOR SOLUTION INTRAVENOUS at 13:28

## 2023-03-11 RX ADMIN — POTASSIUM BICARBONATE 50 MEQ: 978 TABLET, EFFERVESCENT ORAL at 06:50

## 2023-03-11 RX ADMIN — POTASSIUM BICARBONATE 50 MEQ: 978 TABLET, EFFERVESCENT ORAL at 12:16

## 2023-03-11 RX ADMIN — HEPARIN SODIUM 5000 UNITS: 5000 INJECTION INTRAVENOUS; SUBCUTANEOUS at 21:45

## 2023-03-11 RX ADMIN — ACETAMINOPHEN 650 MG: 325 TABLET, FILM COATED ORAL at 06:16

## 2023-03-11 RX ADMIN — METOCLOPRAMIDE HYDROCHLORIDE 5 MG: 5 SOLUTION ORAL at 06:04

## 2023-03-11 RX ADMIN — HEPARIN SODIUM 5000 UNITS: 5000 INJECTION INTRAVENOUS; SUBCUTANEOUS at 12:15

## 2023-03-11 RX ADMIN — METOCLOPRAMIDE HYDROCHLORIDE 5 MG: 5 SOLUTION ORAL at 12:16

## 2023-03-11 RX ADMIN — PIPERACILLIN AND TAZOBACTAM 3375 MG: 3; .375 INJECTION, POWDER, LYOPHILIZED, FOR SOLUTION INTRAVENOUS at 06:05

## 2023-03-11 ASSESSMENT — PAIN DESCRIPTION - LOCATION: LOCATION: ABDOMEN

## 2023-03-11 ASSESSMENT — PAIN SCALES - GENERAL
PAINLEVEL_OUTOF10: 0
PAINLEVEL_OUTOF10: 2
PAINLEVEL_OUTOF10: 0

## 2023-03-12 LAB
ALBUMIN SERPL-MCNC: 1.7 G/DL (ref 3.4–5)
ALBUMIN/GLOB SERPL: 0.5 (ref 0.8–1.7)
ALP SERPL-CCNC: 55 U/L (ref 45–117)
ALT SERPL-CCNC: 18 U/L (ref 16–61)
ANION GAP SERPL CALC-SCNC: 8 MMOL/L (ref 3–18)
AST SERPL-CCNC: 28 U/L (ref 10–38)
BASOPHILS # BLD: 0 K/UL (ref 0–0.1)
BASOPHILS NFR BLD: 0 % (ref 0–2)
BILIRUB SERPL-MCNC: 1.2 MG/DL (ref 0.2–1)
BUN SERPL-MCNC: 13 MG/DL (ref 7–18)
BUN/CREAT SERPL: 12 (ref 12–20)
CALCIUM SERPL-MCNC: 7 MG/DL (ref 8.5–10.1)
CHLORIDE SERPL-SCNC: 105 MMOL/L (ref 100–111)
CO2 SERPL-SCNC: 24 MMOL/L (ref 21–32)
CREAT SERPL-MCNC: 1.13 MG/DL (ref 0.6–1.3)
DIFFERENTIAL METHOD BLD: ABNORMAL
EOSINOPHIL # BLD: 0 K/UL (ref 0–0.4)
EOSINOPHIL NFR BLD: 0 % (ref 0–5)
ERYTHROCYTE [DISTWIDTH] IN BLOOD BY AUTOMATED COUNT: 15.1 % (ref 11.6–14.5)
GLOBULIN SER CALC-MCNC: 3.5 G/DL (ref 2–4)
GLUCOSE BLD STRIP.AUTO-MCNC: 118 MG/DL (ref 70–110)
GLUCOSE BLD STRIP.AUTO-MCNC: 126 MG/DL (ref 70–110)
GLUCOSE BLD STRIP.AUTO-MCNC: 127 MG/DL (ref 70–110)
GLUCOSE BLD STRIP.AUTO-MCNC: 149 MG/DL (ref 70–110)
GLUCOSE SERPL-MCNC: 119 MG/DL (ref 74–99)
HCT VFR BLD AUTO: 23.5 % (ref 36–48)
HGB BLD-MCNC: 7.5 G/DL (ref 13–16)
IMM GRANULOCYTES # BLD AUTO: 0.1 K/UL (ref 0–0.04)
IMM GRANULOCYTES NFR BLD AUTO: 2 % (ref 0–0.5)
LYMPHOCYTES # BLD: 0.5 K/UL (ref 0.9–3.6)
LYMPHOCYTES NFR BLD: 7 % (ref 21–52)
MCH RBC QN AUTO: 27.2 PG (ref 24–34)
MCHC RBC AUTO-ENTMCNC: 31.9 G/DL (ref 31–37)
MCV RBC AUTO: 85.1 FL (ref 78–100)
MONOCYTES # BLD: 0.4 K/UL (ref 0.05–1.2)
MONOCYTES NFR BLD: 6 % (ref 3–10)
NEUTS SEG # BLD: 6.3 K/UL (ref 1.8–8)
NEUTS SEG NFR BLD: 85 % (ref 40–73)
NRBC # BLD: 0 K/UL (ref 0–0.01)
NRBC BLD-RTO: 0 PER 100 WBC
PLATELET # BLD AUTO: 228 K/UL (ref 135–420)
PMV BLD AUTO: 9.5 FL (ref 9.2–11.8)
POTASSIUM SERPL-SCNC: 3.5 MMOL/L (ref 3.5–5.5)
PROT SERPL-MCNC: 5.2 G/DL (ref 6.4–8.2)
RBC # BLD AUTO: 2.76 M/UL (ref 4.35–5.65)
SODIUM SERPL-SCNC: 137 MMOL/L (ref 136–145)
WBC # BLD AUTO: 7.4 K/UL (ref 4.6–13.2)

## 2023-03-12 PROCEDURE — 6360000002 HC RX W HCPCS: Performed by: INTERNAL MEDICINE

## 2023-03-12 PROCEDURE — 6370000000 HC RX 637 (ALT 250 FOR IP): Performed by: INTERNAL MEDICINE

## 2023-03-12 PROCEDURE — 94640 AIRWAY INHALATION TREATMENT: CPT

## 2023-03-12 PROCEDURE — 6360000002 HC RX W HCPCS

## 2023-03-12 PROCEDURE — 99232 SBSQ HOSP IP/OBS MODERATE 35: CPT | Performed by: INTERNAL MEDICINE

## 2023-03-12 PROCEDURE — 6360000002 HC RX W HCPCS: Performed by: STUDENT IN AN ORGANIZED HEALTH CARE EDUCATION/TRAINING PROGRAM

## 2023-03-12 PROCEDURE — 6370000000 HC RX 637 (ALT 250 FOR IP): Performed by: STUDENT IN AN ORGANIZED HEALTH CARE EDUCATION/TRAINING PROGRAM

## 2023-03-12 PROCEDURE — 82962 GLUCOSE BLOOD TEST: CPT

## 2023-03-12 PROCEDURE — 80053 COMPREHEN METABOLIC PANEL: CPT

## 2023-03-12 PROCEDURE — 85025 COMPLETE CBC W/AUTO DIFF WBC: CPT

## 2023-03-12 PROCEDURE — 94760 N-INVAS EAR/PLS OXIMETRY 1: CPT

## 2023-03-12 PROCEDURE — 36415 COLL VENOUS BLD VENIPUNCTURE: CPT

## 2023-03-12 PROCEDURE — 1100000000 HC RM PRIVATE

## 2023-03-12 PROCEDURE — 2580000003 HC RX 258: Performed by: INTERNAL MEDICINE

## 2023-03-12 PROCEDURE — C9113 INJ PANTOPRAZOLE SODIUM, VIA: HCPCS | Performed by: STUDENT IN AN ORGANIZED HEALTH CARE EDUCATION/TRAINING PROGRAM

## 2023-03-12 RX ORDER — IPRATROPIUM BROMIDE AND ALBUTEROL SULFATE 2.5; .5 MG/3ML; MG/3ML
1 SOLUTION RESPIRATORY (INHALATION)
Status: DISCONTINUED | OUTPATIENT
Start: 2023-03-12 | End: 2023-03-13

## 2023-03-12 RX ADMIN — PIPERACILLIN AND TAZOBACTAM 3375 MG: 3; .375 INJECTION, POWDER, LYOPHILIZED, FOR SOLUTION INTRAVENOUS at 22:03

## 2023-03-12 RX ADMIN — PIPERACILLIN AND TAZOBACTAM 3375 MG: 3; .375 INJECTION, POWDER, LYOPHILIZED, FOR SOLUTION INTRAVENOUS at 14:15

## 2023-03-12 RX ADMIN — IPRATROPIUM BROMIDE AND ALBUTEROL SULFATE 1 AMPULE: 2.5; .5 SOLUTION RESPIRATORY (INHALATION) at 11:04

## 2023-03-12 RX ADMIN — ACETAMINOPHEN 650 MG: 325 SUPPOSITORY RECTAL at 16:47

## 2023-03-12 RX ADMIN — PANTOPRAZOLE SODIUM 40 MG: 40 INJECTION, POWDER, FOR SOLUTION INTRAVENOUS at 08:44

## 2023-03-12 RX ADMIN — HEPARIN SODIUM 5000 UNITS: 5000 INJECTION INTRAVENOUS; SUBCUTANEOUS at 22:03

## 2023-03-12 RX ADMIN — IPRATROPIUM BROMIDE AND ALBUTEROL SULFATE 1 AMPULE: 2.5; .5 SOLUTION RESPIRATORY (INHALATION) at 15:57

## 2023-03-12 RX ADMIN — VANCOMYCIN HYDROCHLORIDE 1500 MG: 10 INJECTION, POWDER, LYOPHILIZED, FOR SOLUTION INTRAVENOUS at 18:15

## 2023-03-12 RX ADMIN — SODIUM CHLORIDE, POTASSIUM CHLORIDE, SODIUM LACTATE AND CALCIUM CHLORIDE: 600; 310; 30; 20 INJECTION, SOLUTION INTRAVENOUS at 08:43

## 2023-03-12 RX ADMIN — HEPARIN SODIUM 5000 UNITS: 5000 INJECTION INTRAVENOUS; SUBCUTANEOUS at 06:37

## 2023-03-12 RX ADMIN — ACETAMINOPHEN 650 MG: 325 SUPPOSITORY RECTAL at 00:36

## 2023-03-12 RX ADMIN — HEPARIN SODIUM 5000 UNITS: 5000 INJECTION INTRAVENOUS; SUBCUTANEOUS at 14:14

## 2023-03-12 RX ADMIN — PIPERACILLIN AND TAZOBACTAM 3375 MG: 3; .375 INJECTION, POWDER, LYOPHILIZED, FOR SOLUTION INTRAVENOUS at 06:37

## 2023-03-12 RX ADMIN — IPRATROPIUM BROMIDE AND ALBUTEROL SULFATE 1 AMPULE: 2.5; .5 SOLUTION RESPIRATORY (INHALATION) at 21:05

## 2023-03-12 ASSESSMENT — PAIN DESCRIPTION - LOCATION
LOCATION: BACK
LOCATION: ABDOMEN

## 2023-03-12 ASSESSMENT — PAIN SCALES - GENERAL
PAINLEVEL_OUTOF10: 0
PAINLEVEL_OUTOF10: 2
PAINLEVEL_OUTOF10: 0
PAINLEVEL_OUTOF10: 5

## 2023-03-12 ASSESSMENT — PAIN DESCRIPTION - DESCRIPTORS
DESCRIPTORS: DISCOMFORT
DESCRIPTORS: ACHING

## 2023-03-12 ASSESSMENT — PAIN DESCRIPTION - ORIENTATION: ORIENTATION: LOWER

## 2023-03-12 NOTE — PROCEDURES
Kathrin hospitalist d/t pt now has NGT to suction and is NPO. Accucheks should be q6h; however, pt insulin orders are 0-4 units nightly per sliding scale and before meals its 0-16 units per sliding scale. Which one to use with q6h insulin coverage?     Per Dr. Suleiman Moreno, just use 0-4 units sliding scae and change to q6h

## 2023-03-13 ENCOUNTER — APPOINTMENT (OUTPATIENT)
Facility: HOSPITAL | Age: 68
DRG: 438 | End: 2023-03-13
Payer: MEDICARE

## 2023-03-13 LAB
ANION GAP SERPL CALC-SCNC: 11 MMOL/L (ref 3–18)
BUN SERPL-MCNC: 15 MG/DL (ref 7–18)
BUN/CREAT SERPL: 15 (ref 12–20)
CALCIUM SERPL-MCNC: 7.5 MG/DL (ref 8.5–10.1)
CHLORIDE SERPL-SCNC: 108 MMOL/L (ref 100–111)
CO2 SERPL-SCNC: 21 MMOL/L (ref 21–32)
CREAT SERPL-MCNC: 1 MG/DL (ref 0.6–1.3)
GLUCOSE BLD STRIP.AUTO-MCNC: 194 MG/DL (ref 70–110)
GLUCOSE BLD STRIP.AUTO-MCNC: 196 MG/DL (ref 70–110)
GLUCOSE BLD STRIP.AUTO-MCNC: 197 MG/DL (ref 70–110)
GLUCOSE BLD STRIP.AUTO-MCNC: 274 MG/DL (ref 70–110)
GLUCOSE BLD STRIP.AUTO-MCNC: 335 MG/DL (ref 70–110)
GLUCOSE SERPL-MCNC: 194 MG/DL (ref 74–99)
POTASSIUM SERPL-SCNC: 3.7 MMOL/L (ref 3.5–5.5)
SODIUM SERPL-SCNC: 140 MMOL/L (ref 136–145)

## 2023-03-13 PROCEDURE — 6370000000 HC RX 637 (ALT 250 FOR IP): Performed by: INTERNAL MEDICINE

## 2023-03-13 PROCEDURE — 74018 RADEX ABDOMEN 1 VIEW: CPT

## 2023-03-13 PROCEDURE — 2700000000 HC OXYGEN THERAPY PER DAY

## 2023-03-13 PROCEDURE — 6370000000 HC RX 637 (ALT 250 FOR IP): Performed by: STUDENT IN AN ORGANIZED HEALTH CARE EDUCATION/TRAINING PROGRAM

## 2023-03-13 PROCEDURE — 6360000002 HC RX W HCPCS

## 2023-03-13 PROCEDURE — 2580000003 HC RX 258: Performed by: INTERNAL MEDICINE

## 2023-03-13 PROCEDURE — 2140000001 HC CVICU INTERMEDIATE R&B

## 2023-03-13 PROCEDURE — 97530 THERAPEUTIC ACTIVITIES: CPT

## 2023-03-13 PROCEDURE — 94761 N-INVAS EAR/PLS OXIMETRY MLT: CPT

## 2023-03-13 PROCEDURE — 97116 GAIT TRAINING THERAPY: CPT

## 2023-03-13 PROCEDURE — 6360000002 HC RX W HCPCS: Performed by: STUDENT IN AN ORGANIZED HEALTH CARE EDUCATION/TRAINING PROGRAM

## 2023-03-13 PROCEDURE — 99223 1ST HOSP IP/OBS HIGH 75: CPT | Performed by: SURGERY

## 2023-03-13 PROCEDURE — 94640 AIRWAY INHALATION TREATMENT: CPT

## 2023-03-13 PROCEDURE — 80048 BASIC METABOLIC PNL TOTAL CA: CPT

## 2023-03-13 PROCEDURE — 82962 GLUCOSE BLOOD TEST: CPT

## 2023-03-13 PROCEDURE — C9113 INJ PANTOPRAZOLE SODIUM, VIA: HCPCS | Performed by: STUDENT IN AN ORGANIZED HEALTH CARE EDUCATION/TRAINING PROGRAM

## 2023-03-13 PROCEDURE — 36415 COLL VENOUS BLD VENIPUNCTURE: CPT

## 2023-03-13 PROCEDURE — 6360000002 HC RX W HCPCS: Performed by: INTERNAL MEDICINE

## 2023-03-13 RX ORDER — IPRATROPIUM BROMIDE AND ALBUTEROL SULFATE 2.5; .5 MG/3ML; MG/3ML
1 SOLUTION RESPIRATORY (INHALATION)
Status: DISCONTINUED | OUTPATIENT
Start: 2023-03-13 | End: 2023-03-16

## 2023-03-13 RX ADMIN — SODIUM CHLORIDE, POTASSIUM CHLORIDE, SODIUM LACTATE AND CALCIUM CHLORIDE: 600; 310; 30; 20 INJECTION, SOLUTION INTRAVENOUS at 00:44

## 2023-03-13 RX ADMIN — TRAMADOL HYDROCHLORIDE 50 MG: 50 TABLET, COATED ORAL at 20:03

## 2023-03-13 RX ADMIN — ACETAMINOPHEN 650 MG: 325 SUPPOSITORY RECTAL at 03:10

## 2023-03-13 RX ADMIN — SODIUM CHLORIDE, POTASSIUM CHLORIDE, SODIUM LACTATE AND CALCIUM CHLORIDE: 600; 310; 30; 20 INJECTION, SOLUTION INTRAVENOUS at 21:25

## 2023-03-13 RX ADMIN — HEPARIN SODIUM 5000 UNITS: 5000 INJECTION INTRAVENOUS; SUBCUTANEOUS at 06:31

## 2023-03-13 RX ADMIN — PANTOPRAZOLE SODIUM 40 MG: 40 INJECTION, POWDER, FOR SOLUTION INTRAVENOUS at 08:51

## 2023-03-13 RX ADMIN — HEPARIN SODIUM 5000 UNITS: 5000 INJECTION INTRAVENOUS; SUBCUTANEOUS at 13:40

## 2023-03-13 RX ADMIN — PIPERACILLIN AND TAZOBACTAM 3375 MG: 3; .375 INJECTION, POWDER, LYOPHILIZED, FOR SOLUTION INTRAVENOUS at 13:40

## 2023-03-13 RX ADMIN — PIPERACILLIN AND TAZOBACTAM 3375 MG: 3; .375 INJECTION, POWDER, LYOPHILIZED, FOR SOLUTION INTRAVENOUS at 21:24

## 2023-03-13 RX ADMIN — IPRATROPIUM BROMIDE AND ALBUTEROL SULFATE 1 AMPULE: 2.5; .5 SOLUTION RESPIRATORY (INHALATION) at 08:02

## 2023-03-13 RX ADMIN — SODIUM CHLORIDE, POTASSIUM CHLORIDE, SODIUM LACTATE AND CALCIUM CHLORIDE: 600; 310; 30; 20 INJECTION, SOLUTION INTRAVENOUS at 11:36

## 2023-03-13 RX ADMIN — PIPERACILLIN AND TAZOBACTAM 3375 MG: 3; .375 INJECTION, POWDER, LYOPHILIZED, FOR SOLUTION INTRAVENOUS at 06:31

## 2023-03-13 RX ADMIN — HEPARIN SODIUM 5000 UNITS: 5000 INJECTION INTRAVENOUS; SUBCUTANEOUS at 21:25

## 2023-03-13 ASSESSMENT — PAIN DESCRIPTION - DESCRIPTORS
DESCRIPTORS: ACHING
DESCRIPTORS: ACHING

## 2023-03-13 ASSESSMENT — PAIN DESCRIPTION - LOCATION
LOCATION: BACK
LOCATION: BACK

## 2023-03-13 ASSESSMENT — PAIN SCALES - GENERAL
PAINLEVEL_OUTOF10: 5
PAINLEVEL_OUTOF10: 6
PAINLEVEL_OUTOF10: 2

## 2023-03-13 ASSESSMENT — PAIN DESCRIPTION - ORIENTATION
ORIENTATION: LOWER
ORIENTATION: LOWER

## 2023-03-13 ASSESSMENT — PAIN SCALES - WONG BAKER: WONGBAKER_NUMERICALRESPONSE: 2

## 2023-03-14 LAB
ANION GAP SERPL CALC-SCNC: 6 MMOL/L (ref 3–18)
BACTERIA SPEC CULT: ABNORMAL
BUN SERPL-MCNC: 13 MG/DL (ref 7–18)
BUN/CREAT SERPL: 12 (ref 12–20)
CALCIUM SERPL-MCNC: 7.1 MG/DL (ref 8.5–10.1)
CHLORIDE SERPL-SCNC: 104 MMOL/L (ref 100–111)
CO2 SERPL-SCNC: 26 MMOL/L (ref 21–32)
CREAT SERPL-MCNC: 1.07 MG/DL (ref 0.6–1.3)
GLUCOSE BLD STRIP.AUTO-MCNC: 311 MG/DL (ref 70–110)
GLUCOSE BLD STRIP.AUTO-MCNC: 330 MG/DL (ref 70–110)
GLUCOSE BLD STRIP.AUTO-MCNC: 362 MG/DL (ref 70–110)
GLUCOSE BLD STRIP.AUTO-MCNC: 392 MG/DL (ref 70–110)
GLUCOSE SERPL-MCNC: 332 MG/DL (ref 74–99)
GRAM STN SPEC: ABNORMAL
POTASSIUM SERPL-SCNC: 3.6 MMOL/L (ref 3.5–5.5)
SERVICE CMNT-IMP: ABNORMAL
SODIUM SERPL-SCNC: 136 MMOL/L (ref 136–145)

## 2023-03-14 PROCEDURE — 6370000000 HC RX 637 (ALT 250 FOR IP): Performed by: INTERNAL MEDICINE

## 2023-03-14 PROCEDURE — 2580000003 HC RX 258: Performed by: STUDENT IN AN ORGANIZED HEALTH CARE EDUCATION/TRAINING PROGRAM

## 2023-03-14 PROCEDURE — 6360000002 HC RX W HCPCS: Performed by: INTERNAL MEDICINE

## 2023-03-14 PROCEDURE — 97164 PT RE-EVAL EST PLAN CARE: CPT

## 2023-03-14 PROCEDURE — 2580000003 HC RX 258: Performed by: INTERNAL MEDICINE

## 2023-03-14 PROCEDURE — 94761 N-INVAS EAR/PLS OXIMETRY MLT: CPT

## 2023-03-14 PROCEDURE — 6360000002 HC RX W HCPCS: Performed by: STUDENT IN AN ORGANIZED HEALTH CARE EDUCATION/TRAINING PROGRAM

## 2023-03-14 PROCEDURE — 6370000000 HC RX 637 (ALT 250 FOR IP): Performed by: HOSPITALIST

## 2023-03-14 PROCEDURE — C9113 INJ PANTOPRAZOLE SODIUM, VIA: HCPCS | Performed by: STUDENT IN AN ORGANIZED HEALTH CARE EDUCATION/TRAINING PROGRAM

## 2023-03-14 PROCEDURE — 94640 AIRWAY INHALATION TREATMENT: CPT

## 2023-03-14 PROCEDURE — 99232 SBSQ HOSP IP/OBS MODERATE 35: CPT | Performed by: HOSPITALIST

## 2023-03-14 PROCEDURE — 82962 GLUCOSE BLOOD TEST: CPT

## 2023-03-14 PROCEDURE — 6360000002 HC RX W HCPCS

## 2023-03-14 PROCEDURE — 2140000001 HC CVICU INTERMEDIATE R&B

## 2023-03-14 PROCEDURE — 36415 COLL VENOUS BLD VENIPUNCTURE: CPT

## 2023-03-14 PROCEDURE — 97116 GAIT TRAINING THERAPY: CPT

## 2023-03-14 PROCEDURE — 80048 BASIC METABOLIC PNL TOTAL CA: CPT

## 2023-03-14 RX ORDER — INSULIN LISPRO 100 [IU]/ML
0-4 INJECTION, SOLUTION INTRAVENOUS; SUBCUTANEOUS
Status: DISCONTINUED | OUTPATIENT
Start: 2023-03-14 | End: 2023-03-17

## 2023-03-14 RX ADMIN — SODIUM CHLORIDE, POTASSIUM CHLORIDE, SODIUM LACTATE AND CALCIUM CHLORIDE: 600; 310; 30; 20 INJECTION, SOLUTION INTRAVENOUS at 14:44

## 2023-03-14 RX ADMIN — IPRATROPIUM BROMIDE AND ALBUTEROL SULFATE 1 AMPULE: 2.5; .5 SOLUTION RESPIRATORY (INHALATION) at 00:07

## 2023-03-14 RX ADMIN — INSULIN LISPRO 4 UNITS: 100 INJECTION, SOLUTION INTRAVENOUS; SUBCUTANEOUS at 12:42

## 2023-03-14 RX ADMIN — SODIUM CHLORIDE, POTASSIUM CHLORIDE, SODIUM LACTATE AND CALCIUM CHLORIDE: 600; 310; 30; 20 INJECTION, SOLUTION INTRAVENOUS at 05:49

## 2023-03-14 RX ADMIN — INSULIN LISPRO 4 UNITS: 100 INJECTION, SOLUTION INTRAVENOUS; SUBCUTANEOUS at 06:35

## 2023-03-14 RX ADMIN — HEPARIN SODIUM 5000 UNITS: 5000 INJECTION INTRAVENOUS; SUBCUTANEOUS at 05:48

## 2023-03-14 RX ADMIN — IPRATROPIUM BROMIDE AND ALBUTEROL SULFATE 1 AMPULE: 2.5; .5 SOLUTION RESPIRATORY (INHALATION) at 20:33

## 2023-03-14 RX ADMIN — IPRATROPIUM BROMIDE AND ALBUTEROL SULFATE 1 AMPULE: 2.5; .5 SOLUTION RESPIRATORY (INHALATION) at 09:18

## 2023-03-14 RX ADMIN — INSULIN LISPRO 4 UNITS: 100 INJECTION, SOLUTION INTRAVENOUS; SUBCUTANEOUS at 21:47

## 2023-03-14 RX ADMIN — INSULIN GLARGINE 10 UNITS: 100 INJECTION, SOLUTION SUBCUTANEOUS at 21:46

## 2023-03-14 RX ADMIN — ONDANSETRON 4 MG: 2 INJECTION INTRAMUSCULAR; INTRAVENOUS at 05:49

## 2023-03-14 RX ADMIN — PIPERACILLIN AND TAZOBACTAM 3375 MG: 3; .375 INJECTION, POWDER, LYOPHILIZED, FOR SOLUTION INTRAVENOUS at 21:34

## 2023-03-14 RX ADMIN — PANTOPRAZOLE SODIUM 40 MG: 40 INJECTION, POWDER, FOR SOLUTION INTRAVENOUS at 09:33

## 2023-03-14 RX ADMIN — PIPERACILLIN AND TAZOBACTAM 3375 MG: 3; .375 INJECTION, POWDER, LYOPHILIZED, FOR SOLUTION INTRAVENOUS at 14:45

## 2023-03-14 RX ADMIN — HYDRALAZINE HYDROCHLORIDE 10 MG: 20 INJECTION INTRAMUSCULAR; INTRAVENOUS at 00:07

## 2023-03-14 RX ADMIN — HEPARIN SODIUM 5000 UNITS: 5000 INJECTION INTRAVENOUS; SUBCUTANEOUS at 21:33

## 2023-03-14 RX ADMIN — CARVEDILOL 3.12 MG: 3.12 TABLET, FILM COATED ORAL at 17:25

## 2023-03-14 RX ADMIN — SODIUM CHLORIDE, PRESERVATIVE FREE 10 ML: 5 INJECTION INTRAVENOUS at 21:34

## 2023-03-14 RX ADMIN — INSULIN LISPRO 3 UNITS: 100 INJECTION, SOLUTION INTRAVENOUS; SUBCUTANEOUS at 17:20

## 2023-03-14 RX ADMIN — PIPERACILLIN AND TAZOBACTAM 3375 MG: 3; .375 INJECTION, POWDER, LYOPHILIZED, FOR SOLUTION INTRAVENOUS at 06:01

## 2023-03-14 RX ADMIN — SODIUM CHLORIDE, POTASSIUM CHLORIDE, SODIUM LACTATE AND CALCIUM CHLORIDE: 600; 310; 30; 20 INJECTION, SOLUTION INTRAVENOUS at 22:44

## 2023-03-14 RX ADMIN — HEPARIN SODIUM 5000 UNITS: 5000 INJECTION INTRAVENOUS; SUBCUTANEOUS at 14:45

## 2023-03-14 RX ADMIN — INSULIN LISPRO 4 UNITS: 100 INJECTION, SOLUTION INTRAVENOUS; SUBCUTANEOUS at 17:22

## 2023-03-14 RX ADMIN — INSULIN LISPRO 4 UNITS: 100 INJECTION, SOLUTION INTRAVENOUS; SUBCUTANEOUS at 00:08

## 2023-03-14 ASSESSMENT — PAIN SCALES - GENERAL
PAINLEVEL_OUTOF10: 0

## 2023-03-15 LAB
ANION GAP SERPL CALC-SCNC: 5 MMOL/L (ref 3–18)
BACTERIA SPEC CULT: NORMAL
BACTERIA SPEC CULT: NORMAL
BUN SERPL-MCNC: 9 MG/DL (ref 7–18)
BUN/CREAT SERPL: 9 (ref 12–20)
CALCIUM SERPL-MCNC: 6.8 MG/DL (ref 8.5–10.1)
CHLORIDE SERPL-SCNC: 103 MMOL/L (ref 100–111)
CO2 SERPL-SCNC: 27 MMOL/L (ref 21–32)
CREAT SERPL-MCNC: 1.01 MG/DL (ref 0.6–1.3)
GLUCOSE BLD STRIP.AUTO-MCNC: 173 MG/DL (ref 70–110)
GLUCOSE BLD STRIP.AUTO-MCNC: 209 MG/DL (ref 70–110)
GLUCOSE BLD STRIP.AUTO-MCNC: 279 MG/DL (ref 70–110)
GLUCOSE BLD STRIP.AUTO-MCNC: 290 MG/DL (ref 70–110)
GLUCOSE SERPL-MCNC: 208 MG/DL (ref 74–99)
POTASSIUM SERPL-SCNC: 3.3 MMOL/L (ref 3.5–5.5)
SERVICE CMNT-IMP: NORMAL
SERVICE CMNT-IMP: NORMAL
SODIUM SERPL-SCNC: 135 MMOL/L (ref 136–145)

## 2023-03-15 PROCEDURE — 36415 COLL VENOUS BLD VENIPUNCTURE: CPT

## 2023-03-15 PROCEDURE — 2580000003 HC RX 258: Performed by: INTERNAL MEDICINE

## 2023-03-15 PROCEDURE — 99233 SBSQ HOSP IP/OBS HIGH 50: CPT | Performed by: SURGERY

## 2023-03-15 PROCEDURE — 6360000002 HC RX W HCPCS

## 2023-03-15 PROCEDURE — 82962 GLUCOSE BLOOD TEST: CPT

## 2023-03-15 PROCEDURE — 94640 AIRWAY INHALATION TREATMENT: CPT

## 2023-03-15 PROCEDURE — 2580000003 HC RX 258: Performed by: STUDENT IN AN ORGANIZED HEALTH CARE EDUCATION/TRAINING PROGRAM

## 2023-03-15 PROCEDURE — 99232 SBSQ HOSP IP/OBS MODERATE 35: CPT | Performed by: HOSPITALIST

## 2023-03-15 PROCEDURE — 80048 BASIC METABOLIC PNL TOTAL CA: CPT

## 2023-03-15 PROCEDURE — 51798 US URINE CAPACITY MEASURE: CPT

## 2023-03-15 PROCEDURE — 6370000000 HC RX 637 (ALT 250 FOR IP): Performed by: INTERNAL MEDICINE

## 2023-03-15 PROCEDURE — C9113 INJ PANTOPRAZOLE SODIUM, VIA: HCPCS | Performed by: STUDENT IN AN ORGANIZED HEALTH CARE EDUCATION/TRAINING PROGRAM

## 2023-03-15 PROCEDURE — 2140000001 HC CVICU INTERMEDIATE R&B

## 2023-03-15 PROCEDURE — 97168 OT RE-EVAL EST PLAN CARE: CPT

## 2023-03-15 PROCEDURE — 6360000002 HC RX W HCPCS: Performed by: STUDENT IN AN ORGANIZED HEALTH CARE EDUCATION/TRAINING PROGRAM

## 2023-03-15 PROCEDURE — 6360000002 HC RX W HCPCS: Performed by: INTERNAL MEDICINE

## 2023-03-15 RX ADMIN — SODIUM CHLORIDE, POTASSIUM CHLORIDE, SODIUM LACTATE AND CALCIUM CHLORIDE: 600; 310; 30; 20 INJECTION, SOLUTION INTRAVENOUS at 14:40

## 2023-03-15 RX ADMIN — THERA TABS 1 TABLET: TAB at 08:36

## 2023-03-15 RX ADMIN — SODIUM CHLORIDE, PRESERVATIVE FREE 10 ML: 5 INJECTION INTRAVENOUS at 21:03

## 2023-03-15 RX ADMIN — IPRATROPIUM BROMIDE AND ALBUTEROL SULFATE 1 AMPULE: 2.5; .5 SOLUTION RESPIRATORY (INHALATION) at 14:07

## 2023-03-15 RX ADMIN — FOLIC ACID 1 MG: 1 TABLET ORAL at 08:35

## 2023-03-15 RX ADMIN — CARVEDILOL 3.12 MG: 3.12 TABLET, FILM COATED ORAL at 08:35

## 2023-03-15 RX ADMIN — METOCLOPRAMIDE HYDROCHLORIDE 5 MG: 5 SOLUTION ORAL at 17:05

## 2023-03-15 RX ADMIN — PIPERACILLIN AND TAZOBACTAM 3375 MG: 3; .375 INJECTION, POWDER, LYOPHILIZED, FOR SOLUTION INTRAVENOUS at 21:02

## 2023-03-15 RX ADMIN — PIPERACILLIN AND TAZOBACTAM 3375 MG: 3; .375 INJECTION, POWDER, LYOPHILIZED, FOR SOLUTION INTRAVENOUS at 06:17

## 2023-03-15 RX ADMIN — INSULIN LISPRO 3 UNITS: 100 INJECTION, SOLUTION INTRAVENOUS; SUBCUTANEOUS at 08:44

## 2023-03-15 RX ADMIN — INSULIN LISPRO 3 UNITS: 100 INJECTION, SOLUTION INTRAVENOUS; SUBCUTANEOUS at 17:07

## 2023-03-15 RX ADMIN — SODIUM CHLORIDE, PRESERVATIVE FREE 10 ML: 5 INJECTION INTRAVENOUS at 08:44

## 2023-03-15 RX ADMIN — HEPARIN SODIUM 5000 UNITS: 5000 INJECTION INTRAVENOUS; SUBCUTANEOUS at 21:02

## 2023-03-15 RX ADMIN — CARVEDILOL 3.12 MG: 3.12 TABLET, FILM COATED ORAL at 17:05

## 2023-03-15 RX ADMIN — HEPARIN SODIUM 5000 UNITS: 5000 INJECTION INTRAVENOUS; SUBCUTANEOUS at 06:16

## 2023-03-15 RX ADMIN — METOCLOPRAMIDE HYDROCHLORIDE 5 MG: 5 SOLUTION ORAL at 06:17

## 2023-03-15 RX ADMIN — IPRATROPIUM BROMIDE AND ALBUTEROL SULFATE 1 AMPULE: 2.5; .5 SOLUTION RESPIRATORY (INHALATION) at 19:52

## 2023-03-15 RX ADMIN — SENNOSIDES AND DOCUSATE SODIUM 2 TABLET: 50; 8.6 TABLET ORAL at 08:35

## 2023-03-15 RX ADMIN — HEPARIN SODIUM 5000 UNITS: 5000 INJECTION INTRAVENOUS; SUBCUTANEOUS at 13:52

## 2023-03-15 RX ADMIN — PIPERACILLIN AND TAZOBACTAM 3375 MG: 3; .375 INJECTION, POWDER, LYOPHILIZED, FOR SOLUTION INTRAVENOUS at 13:53

## 2023-03-15 RX ADMIN — METOCLOPRAMIDE HYDROCHLORIDE 5 MG: 5 SOLUTION ORAL at 12:33

## 2023-03-15 RX ADMIN — INSULIN GLARGINE 10 UNITS: 100 INJECTION, SOLUTION SUBCUTANEOUS at 20:59

## 2023-03-15 RX ADMIN — SODIUM CHLORIDE, POTASSIUM CHLORIDE, SODIUM LACTATE AND CALCIUM CHLORIDE: 600; 310; 30; 20 INJECTION, SOLUTION INTRAVENOUS at 06:30

## 2023-03-15 RX ADMIN — PANTOPRAZOLE SODIUM 40 MG: 40 INJECTION, POWDER, FOR SOLUTION INTRAVENOUS at 08:36

## 2023-03-16 ENCOUNTER — APPOINTMENT (OUTPATIENT)
Facility: HOSPITAL | Age: 68
DRG: 438 | End: 2023-03-16
Payer: MEDICARE

## 2023-03-16 LAB
ANION GAP SERPL CALC-SCNC: 7 MMOL/L (ref 3–18)
BASOPHILS # BLD: 0 K/UL (ref 0–0.1)
BASOPHILS NFR BLD: 0 % (ref 0–2)
BUN SERPL-MCNC: 10 MG/DL (ref 7–18)
BUN/CREAT SERPL: 8 (ref 12–20)
CALCIUM SERPL-MCNC: 6.6 MG/DL (ref 8.5–10.1)
CHLORIDE SERPL-SCNC: 101 MMOL/L (ref 100–111)
CO2 SERPL-SCNC: 25 MMOL/L (ref 21–32)
CREAT SERPL-MCNC: 1.22 MG/DL (ref 0.6–1.3)
DIFFERENTIAL METHOD BLD: ABNORMAL
EOSINOPHIL # BLD: 0 K/UL (ref 0–0.4)
EOSINOPHIL NFR BLD: 0 % (ref 0–5)
ERYTHROCYTE [DISTWIDTH] IN BLOOD BY AUTOMATED COUNT: 15.3 % (ref 11.6–14.5)
GLUCOSE BLD STRIP.AUTO-MCNC: 181 MG/DL (ref 70–110)
GLUCOSE BLD STRIP.AUTO-MCNC: 187 MG/DL (ref 70–110)
GLUCOSE BLD STRIP.AUTO-MCNC: 223 MG/DL (ref 70–110)
GLUCOSE BLD STRIP.AUTO-MCNC: 230 MG/DL (ref 70–110)
GLUCOSE SERPL-MCNC: 215 MG/DL (ref 74–99)
HCT VFR BLD AUTO: 24 % (ref 36–48)
HGB BLD-MCNC: 8 G/DL (ref 13–16)
IMM GRANULOCYTES # BLD AUTO: 0 K/UL (ref 0–0.04)
IMM GRANULOCYTES NFR BLD AUTO: 0 % (ref 0–0.5)
LYMPHOCYTES # BLD: 0.3 K/UL (ref 0.9–3.6)
LYMPHOCYTES NFR BLD: 4 % (ref 21–52)
MCH RBC QN AUTO: 27.1 PG (ref 24–34)
MCHC RBC AUTO-ENTMCNC: 33.3 G/DL (ref 31–37)
MCV RBC AUTO: 81.4 FL (ref 78–100)
MONOCYTES # BLD: 0.3 K/UL (ref 0.05–1.2)
MONOCYTES NFR BLD: 4 % (ref 3–10)
NEUTS BAND NFR BLD MANUAL: 9 %
NEUTS SEG # BLD: 7.3 K/UL (ref 1.8–8)
NEUTS SEG NFR BLD: 83 % (ref 40–73)
NRBC # BLD: 0 K/UL (ref 0–0.01)
NRBC BLD-RTO: 0 PER 100 WBC
PLATELET # BLD AUTO: 335 K/UL (ref 135–420)
PLATELET COMMENT: ABNORMAL
PMV BLD AUTO: 9.6 FL (ref 9.2–11.8)
POTASSIUM SERPL-SCNC: 3.3 MMOL/L (ref 3.5–5.5)
RBC # BLD AUTO: 2.95 M/UL (ref 4.35–5.65)
RBC MORPH BLD: ABNORMAL
RBC MORPH BLD: ABNORMAL
SODIUM SERPL-SCNC: 133 MMOL/L (ref 136–145)
WBC # BLD AUTO: 7.9 K/UL (ref 4.6–13.2)

## 2023-03-16 PROCEDURE — 74177 CT ABD & PELVIS W/CONTRAST: CPT

## 2023-03-16 PROCEDURE — 2580000003 HC RX 258: Performed by: STUDENT IN AN ORGANIZED HEALTH CARE EDUCATION/TRAINING PROGRAM

## 2023-03-16 PROCEDURE — 36415 COLL VENOUS BLD VENIPUNCTURE: CPT

## 2023-03-16 PROCEDURE — C9113 INJ PANTOPRAZOLE SODIUM, VIA: HCPCS | Performed by: STUDENT IN AN ORGANIZED HEALTH CARE EDUCATION/TRAINING PROGRAM

## 2023-03-16 PROCEDURE — 6360000004 HC RX CONTRAST MEDICATION: Performed by: NURSE PRACTITIONER

## 2023-03-16 PROCEDURE — 85025 COMPLETE CBC W/AUTO DIFF WBC: CPT

## 2023-03-16 PROCEDURE — 6360000002 HC RX W HCPCS: Performed by: STUDENT IN AN ORGANIZED HEALTH CARE EDUCATION/TRAINING PROGRAM

## 2023-03-16 PROCEDURE — 99223 1ST HOSP IP/OBS HIGH 75: CPT | Performed by: SURGERY

## 2023-03-16 PROCEDURE — 2140000001 HC CVICU INTERMEDIATE R&B

## 2023-03-16 PROCEDURE — 51701 INSERT BLADDER CATHETER: CPT

## 2023-03-16 PROCEDURE — 82962 GLUCOSE BLOOD TEST: CPT

## 2023-03-16 PROCEDURE — 2580000003 HC RX 258: Performed by: INTERNAL MEDICINE

## 2023-03-16 PROCEDURE — 80048 BASIC METABOLIC PNL TOTAL CA: CPT

## 2023-03-16 PROCEDURE — 94761 N-INVAS EAR/PLS OXIMETRY MLT: CPT

## 2023-03-16 PROCEDURE — 6370000000 HC RX 637 (ALT 250 FOR IP): Performed by: INTERNAL MEDICINE

## 2023-03-16 PROCEDURE — 2580000003 HC RX 258: Performed by: HOSPITALIST

## 2023-03-16 PROCEDURE — 6360000002 HC RX W HCPCS: Performed by: INTERNAL MEDICINE

## 2023-03-16 PROCEDURE — 6360000002 HC RX W HCPCS

## 2023-03-16 PROCEDURE — 6360000004 HC RX CONTRAST MEDICATION: Performed by: HOSPITALIST

## 2023-03-16 PROCEDURE — 6360000002 HC RX W HCPCS: Performed by: HOSPITALIST

## 2023-03-16 RX ORDER — IPRATROPIUM BROMIDE AND ALBUTEROL SULFATE 2.5; .5 MG/3ML; MG/3ML
1 SOLUTION RESPIRATORY (INHALATION) EVERY 4 HOURS PRN
Status: DISCONTINUED | OUTPATIENT
Start: 2023-03-16 | End: 2023-03-22 | Stop reason: HOSPADM

## 2023-03-16 RX ADMIN — SENNOSIDES AND DOCUSATE SODIUM 2 TABLET: 50; 8.6 TABLET ORAL at 09:51

## 2023-03-16 RX ADMIN — SODIUM CHLORIDE, PRESERVATIVE FREE 10 ML: 5 INJECTION INTRAVENOUS at 03:37

## 2023-03-16 RX ADMIN — METOCLOPRAMIDE HYDROCHLORIDE 5 MG: 5 SOLUTION ORAL at 16:32

## 2023-03-16 RX ADMIN — PANTOPRAZOLE SODIUM 40 MG: 40 INJECTION, POWDER, FOR SOLUTION INTRAVENOUS at 09:51

## 2023-03-16 RX ADMIN — THERA TABS 1 TABLET: TAB at 09:48

## 2023-03-16 RX ADMIN — FOLIC ACID 1 MG: 1 TABLET ORAL at 09:47

## 2023-03-16 RX ADMIN — THIAMINE HYDROCHLORIDE 200 MG: 100 INJECTION, SOLUTION INTRAMUSCULAR; INTRAVENOUS at 16:32

## 2023-03-16 RX ADMIN — VANCOMYCIN HYDROCHLORIDE 2000 MG: 10 INJECTION, POWDER, LYOPHILIZED, FOR SOLUTION INTRAVENOUS at 20:30

## 2023-03-16 RX ADMIN — DIATRIZOATE MEGLUMINE AND DIATRIZOATE SODIUM 30 ML: 660; 100 LIQUID ORAL; RECTAL at 11:00

## 2023-03-16 RX ADMIN — CARVEDILOL 3.12 MG: 3.12 TABLET, FILM COATED ORAL at 17:05

## 2023-03-16 RX ADMIN — METOCLOPRAMIDE HYDROCHLORIDE 5 MG: 5 SOLUTION ORAL at 10:07

## 2023-03-16 RX ADMIN — ONDANSETRON 4 MG: 2 INJECTION INTRAMUSCULAR; INTRAVENOUS at 03:37

## 2023-03-16 RX ADMIN — TRAMADOL HYDROCHLORIDE 50 MG: 50 TABLET, COATED ORAL at 21:17

## 2023-03-16 RX ADMIN — IOPAMIDOL 70 ML: 612 INJECTION, SOLUTION INTRAVENOUS at 13:32

## 2023-03-16 RX ADMIN — CARVEDILOL 3.12 MG: 3.12 TABLET, FILM COATED ORAL at 09:47

## 2023-03-16 RX ADMIN — SODIUM CHLORIDE, PRESERVATIVE FREE 10 ML: 5 INJECTION INTRAVENOUS at 09:50

## 2023-03-16 RX ADMIN — HEPARIN SODIUM 5000 UNITS: 5000 INJECTION INTRAVENOUS; SUBCUTANEOUS at 06:13

## 2023-03-16 RX ADMIN — MEROPENEM 1000 MG: 1 INJECTION, POWDER, FOR SOLUTION INTRAVENOUS at 17:14

## 2023-03-16 RX ADMIN — HEPARIN SODIUM 5000 UNITS: 5000 INJECTION INTRAVENOUS; SUBCUTANEOUS at 21:09

## 2023-03-16 RX ADMIN — INSULIN LISPRO 3 UNITS: 100 INJECTION, SOLUTION INTRAVENOUS; SUBCUTANEOUS at 17:06

## 2023-03-16 RX ADMIN — PIPERACILLIN AND TAZOBACTAM 3375 MG: 3; .375 INJECTION, POWDER, LYOPHILIZED, FOR SOLUTION INTRAVENOUS at 14:50

## 2023-03-16 RX ADMIN — HEPARIN SODIUM 5000 UNITS: 5000 INJECTION INTRAVENOUS; SUBCUTANEOUS at 13:20

## 2023-03-16 RX ADMIN — PIPERACILLIN AND TAZOBACTAM 3375 MG: 3; .375 INJECTION, POWDER, LYOPHILIZED, FOR SOLUTION INTRAVENOUS at 06:13

## 2023-03-16 RX ADMIN — METOCLOPRAMIDE HYDROCHLORIDE 5 MG: 5 SOLUTION ORAL at 06:12

## 2023-03-16 ASSESSMENT — PAIN SCALES - GENERAL
PAINLEVEL_OUTOF10: 0
PAINLEVEL_OUTOF10: 7
PAINLEVEL_OUTOF10: 0

## 2023-03-16 ASSESSMENT — PAIN DESCRIPTION - LOCATION: LOCATION: ABDOMEN

## 2023-03-16 ASSESSMENT — PAIN SCALES - WONG BAKER: WONGBAKER_NUMERICALRESPONSE: 2

## 2023-03-16 ASSESSMENT — PAIN DESCRIPTION - DESCRIPTORS: DESCRIPTORS: DISCOMFORT

## 2023-03-16 ASSESSMENT — PAIN DESCRIPTION - ORIENTATION: ORIENTATION: ANTERIOR

## 2023-03-17 PROBLEM — K85.22 ALCOHOL-INDUCED ACUTE PANCREATITIS WITH INFECTED NECROSIS: Status: ACTIVE | Noted: 2023-03-03

## 2023-03-17 LAB
GLUCOSE BLD STRIP.AUTO-MCNC: 210 MG/DL (ref 70–110)
GLUCOSE BLD STRIP.AUTO-MCNC: 257 MG/DL (ref 70–110)
GLUCOSE BLD STRIP.AUTO-MCNC: 264 MG/DL (ref 70–110)

## 2023-03-17 PROCEDURE — 2140000001 HC CVICU INTERMEDIATE R&B

## 2023-03-17 PROCEDURE — 6360000002 HC RX W HCPCS: Performed by: STUDENT IN AN ORGANIZED HEALTH CARE EDUCATION/TRAINING PROGRAM

## 2023-03-17 PROCEDURE — 6370000000 HC RX 637 (ALT 250 FOR IP): Performed by: INTERNAL MEDICINE

## 2023-03-17 PROCEDURE — 6360000002 HC RX W HCPCS: Performed by: HOSPITALIST

## 2023-03-17 PROCEDURE — 6360000002 HC RX W HCPCS

## 2023-03-17 PROCEDURE — 6370000000 HC RX 637 (ALT 250 FOR IP): Performed by: HOSPITALIST

## 2023-03-17 PROCEDURE — 99232 SBSQ HOSP IP/OBS MODERATE 35: CPT | Performed by: HOSPITALIST

## 2023-03-17 PROCEDURE — 94640 AIRWAY INHALATION TREATMENT: CPT

## 2023-03-17 PROCEDURE — 2580000003 HC RX 258: Performed by: HOSPITALIST

## 2023-03-17 PROCEDURE — 94761 N-INVAS EAR/PLS OXIMETRY MLT: CPT

## 2023-03-17 PROCEDURE — C9113 INJ PANTOPRAZOLE SODIUM, VIA: HCPCS | Performed by: STUDENT IN AN ORGANIZED HEALTH CARE EDUCATION/TRAINING PROGRAM

## 2023-03-17 PROCEDURE — 82962 GLUCOSE BLOOD TEST: CPT

## 2023-03-17 RX ORDER — INSULIN LISPRO 100 [IU]/ML
3 INJECTION, SOLUTION INTRAVENOUS; SUBCUTANEOUS EVERY 6 HOURS
Status: DISCONTINUED | OUTPATIENT
Start: 2023-03-17 | End: 2023-03-17

## 2023-03-17 RX ORDER — INSULIN LISPRO 100 [IU]/ML
0-4 INJECTION, SOLUTION INTRAVENOUS; SUBCUTANEOUS NIGHTLY
Status: DISCONTINUED | OUTPATIENT
Start: 2023-03-17 | End: 2023-03-17

## 2023-03-17 RX ORDER — INSULIN LISPRO 100 [IU]/ML
0-8 INJECTION, SOLUTION INTRAVENOUS; SUBCUTANEOUS EVERY 6 HOURS SCHEDULED
Status: DISCONTINUED | OUTPATIENT
Start: 2023-03-18 | End: 2023-03-22 | Stop reason: HOSPADM

## 2023-03-17 RX ADMIN — THERA TABS 1 TABLET: TAB at 09:12

## 2023-03-17 RX ADMIN — IPRATROPIUM BROMIDE AND ALBUTEROL SULFATE 1 AMPULE: .5; 3 SOLUTION RESPIRATORY (INHALATION) at 23:47

## 2023-03-17 RX ADMIN — CARVEDILOL 3.12 MG: 3.12 TABLET, FILM COATED ORAL at 17:15

## 2023-03-17 RX ADMIN — HEPARIN SODIUM 5000 UNITS: 5000 INJECTION INTRAVENOUS; SUBCUTANEOUS at 14:24

## 2023-03-17 RX ADMIN — INSULIN LISPRO 3 UNITS: 100 INJECTION, SOLUTION INTRAVENOUS; SUBCUTANEOUS at 17:18

## 2023-03-17 RX ADMIN — MEROPENEM 1000 MG: 1 INJECTION, POWDER, FOR SOLUTION INTRAVENOUS at 17:15

## 2023-03-17 RX ADMIN — METOCLOPRAMIDE HYDROCHLORIDE 5 MG: 5 SOLUTION ORAL at 11:57

## 2023-03-17 RX ADMIN — FOLIC ACID 1 MG: 1 TABLET ORAL at 09:12

## 2023-03-17 RX ADMIN — MEROPENEM 1000 MG: 1 INJECTION, POWDER, FOR SOLUTION INTRAVENOUS at 00:32

## 2023-03-17 RX ADMIN — MEROPENEM 1000 MG: 1 INJECTION, POWDER, FOR SOLUTION INTRAVENOUS at 09:13

## 2023-03-17 RX ADMIN — METOCLOPRAMIDE HYDROCHLORIDE 5 MG: 5 SOLUTION ORAL at 09:11

## 2023-03-17 RX ADMIN — HEPARIN SODIUM 5000 UNITS: 5000 INJECTION INTRAVENOUS; SUBCUTANEOUS at 05:33

## 2023-03-17 RX ADMIN — HEPARIN SODIUM 5000 UNITS: 5000 INJECTION INTRAVENOUS; SUBCUTANEOUS at 21:30

## 2023-03-17 RX ADMIN — PANTOPRAZOLE SODIUM 40 MG: 40 INJECTION, POWDER, FOR SOLUTION INTRAVENOUS at 09:12

## 2023-03-17 RX ADMIN — VANCOMYCIN HYDROCHLORIDE 750 MG: 750 INJECTION, POWDER, LYOPHILIZED, FOR SOLUTION INTRAVENOUS at 09:12

## 2023-03-17 RX ADMIN — METOCLOPRAMIDE HYDROCHLORIDE 5 MG: 5 SOLUTION ORAL at 14:23

## 2023-03-17 RX ADMIN — CARVEDILOL 3.12 MG: 3.12 TABLET, FILM COATED ORAL at 09:12

## 2023-03-17 RX ADMIN — THIAMINE HYDROCHLORIDE 200 MG: 100 INJECTION, SOLUTION INTRAMUSCULAR; INTRAVENOUS at 09:30

## 2023-03-17 ASSESSMENT — PAIN SCALES - GENERAL
PAINLEVEL_OUTOF10: 0

## 2023-03-17 NOTE — CARE COORDINATION
Chart reviewed. Plan is for pt to be transferred to [facility]. Completed PCS form for transport and pt's facesheet in pt's blue chart. Informed Nurse Manan Shell.           FANNY LynchN RN  Care Management
Chart reviewed:  Transitional care plan is for pt to return home with home health. Home health already arranged with [facility] and pt received home use oxygen. Case Management will continue to follow along.         SUKHDEEP Willard RN  Care Management
Met with pt and he is in agreement to use [facility]. Home health orders sent to [address] [place] [address] was notified. Oxygen order completed in [facility] for processing.         FANNY SpencerN RN  Care Management
Pt confirmed that his oxygen was delivered yesterday. Oxygen in pt's room.           Rainey Olszewski, FANNYN RN  Care Management
Oriented;Person;Place;Situation   Cognition Alert   Primary Caregiver Self   Support Systems Spouse/Significant Other   PCP Verified by CM Yes   Prior Functional Level Independent in ADLs/IADLs   Current Functional Level Independent in ADLs/IADLs   Can patient return to prior living arrangement Yes   Ability to make needs known: Good   Family able to assist with home care needs: Yes   Financial Resources Medicare   Social/Functional History   Type of [address] Two level   Home Access Stairs to enter without rails   Entrance Stairs - Number of Steps 2   [address]   Transfer Assistance Independent   Active  Yes   Mode of Transportation Car   Occupation Full time employment   Discharge Planning   Type of [address] Prior To Admission [facility] Discharge   Transition of [address] (CM Consult) [facility] None   Mode of Transport at Discharge Other (see comment)  (family)   Confirm Follow Up Transport Family         SUKHDEEP Sarmiento RN  Care Management

## 2023-03-18 LAB
ALBUMIN SERPL-MCNC: 1.4 G/DL (ref 3.4–5)
ALBUMIN/GLOB SERPL: 0.4 (ref 0.8–1.7)
ALP SERPL-CCNC: 115 U/L (ref 45–117)
ALT SERPL-CCNC: 19 U/L (ref 16–61)
ANION GAP SERPL CALC-SCNC: 5 MMOL/L (ref 3–18)
AST SERPL-CCNC: 21 U/L (ref 10–38)
BILIRUB SERPL-MCNC: 4 MG/DL (ref 0.2–1)
BUN SERPL-MCNC: 25 MG/DL (ref 7–18)
BUN/CREAT SERPL: 15 (ref 12–20)
CALCIUM SERPL-MCNC: 7.2 MG/DL (ref 8.5–10.1)
CHLORIDE SERPL-SCNC: 102 MMOL/L (ref 100–111)
CO2 SERPL-SCNC: 28 MMOL/L (ref 21–32)
CREAT SERPL-MCNC: 1.62 MG/DL (ref 0.6–1.3)
GLOBULIN SER CALC-MCNC: 3.7 G/DL (ref 2–4)
GLUCOSE BLD STRIP.AUTO-MCNC: 189 MG/DL (ref 70–110)
GLUCOSE BLD STRIP.AUTO-MCNC: 192 MG/DL (ref 70–110)
GLUCOSE BLD STRIP.AUTO-MCNC: 205 MG/DL (ref 70–110)
GLUCOSE BLD STRIP.AUTO-MCNC: 213 MG/DL (ref 70–110)
GLUCOSE SERPL-MCNC: 188 MG/DL (ref 74–99)
MAGNESIUM SERPL-MCNC: 1.8 MG/DL (ref 1.6–2.6)
PHOSPHATE SERPL-MCNC: 2.9 MG/DL (ref 2.5–4.9)
POTASSIUM SERPL-SCNC: 3.1 MMOL/L (ref 3.5–5.5)
PREALB SERPL-MCNC: <3 MG/DL (ref 20–40)
PROT SERPL-MCNC: 5.1 G/DL (ref 6.4–8.2)
SODIUM SERPL-SCNC: 135 MMOL/L (ref 136–145)
TRIGL SERPL-MCNC: 197 MG/DL

## 2023-03-18 PROCEDURE — 6370000000 HC RX 637 (ALT 250 FOR IP): Performed by: STUDENT IN AN ORGANIZED HEALTH CARE EDUCATION/TRAINING PROGRAM

## 2023-03-18 PROCEDURE — 2580000003 HC RX 258: Performed by: HOSPITALIST

## 2023-03-18 PROCEDURE — 6360000002 HC RX W HCPCS

## 2023-03-18 PROCEDURE — 83735 ASSAY OF MAGNESIUM: CPT

## 2023-03-18 PROCEDURE — 2140000001 HC CVICU INTERMEDIATE R&B

## 2023-03-18 PROCEDURE — 6360000002 HC RX W HCPCS: Performed by: HOSPITALIST

## 2023-03-18 PROCEDURE — 2500000003 HC RX 250 WO HCPCS: Performed by: HOSPITALIST

## 2023-03-18 PROCEDURE — 6370000000 HC RX 637 (ALT 250 FOR IP): Performed by: INTERNAL MEDICINE

## 2023-03-18 PROCEDURE — 80053 COMPREHEN METABOLIC PANEL: CPT

## 2023-03-18 PROCEDURE — C9113 INJ PANTOPRAZOLE SODIUM, VIA: HCPCS | Performed by: STUDENT IN AN ORGANIZED HEALTH CARE EDUCATION/TRAINING PROGRAM

## 2023-03-18 PROCEDURE — 84478 ASSAY OF TRIGLYCERIDES: CPT

## 2023-03-18 PROCEDURE — 84134 ASSAY OF PREALBUMIN: CPT

## 2023-03-18 PROCEDURE — 36415 COLL VENOUS BLD VENIPUNCTURE: CPT

## 2023-03-18 PROCEDURE — 82962 GLUCOSE BLOOD TEST: CPT

## 2023-03-18 PROCEDURE — 94761 N-INVAS EAR/PLS OXIMETRY MLT: CPT

## 2023-03-18 PROCEDURE — 6360000002 HC RX W HCPCS: Performed by: STUDENT IN AN ORGANIZED HEALTH CARE EDUCATION/TRAINING PROGRAM

## 2023-03-18 PROCEDURE — 84100 ASSAY OF PHOSPHORUS: CPT

## 2023-03-18 PROCEDURE — 99232 SBSQ HOSP IP/OBS MODERATE 35: CPT | Performed by: HOSPITALIST

## 2023-03-18 RX ADMIN — POTASSIUM CHLORIDE: 2 INJECTION, SOLUTION, CONCENTRATE INTRAVENOUS at 20:10

## 2023-03-18 RX ADMIN — THERA TABS 1 TABLET: TAB at 08:25

## 2023-03-18 RX ADMIN — HEPARIN SODIUM 5000 UNITS: 5000 INJECTION INTRAVENOUS; SUBCUTANEOUS at 05:48

## 2023-03-18 RX ADMIN — INSULIN LISPRO 4 UNITS: 100 INJECTION, SOLUTION INTRAVENOUS; SUBCUTANEOUS at 00:26

## 2023-03-18 RX ADMIN — MEROPENEM 1000 MG: 1 INJECTION, POWDER, FOR SOLUTION INTRAVENOUS at 00:30

## 2023-03-18 RX ADMIN — INSULIN LISPRO 2 UNITS: 100 INJECTION, SOLUTION INTRAVENOUS; SUBCUTANEOUS at 05:53

## 2023-03-18 RX ADMIN — INSULIN LISPRO 2 UNITS: 100 INJECTION, SOLUTION INTRAVENOUS; SUBCUTANEOUS at 13:02

## 2023-03-18 RX ADMIN — METOCLOPRAMIDE HYDROCHLORIDE 5 MG: 5 SOLUTION ORAL at 08:25

## 2023-03-18 RX ADMIN — FOLIC ACID 1 MG: 1 TABLET ORAL at 08:26

## 2023-03-18 RX ADMIN — CARVEDILOL 3.12 MG: 3.12 TABLET, FILM COATED ORAL at 08:25

## 2023-03-18 RX ADMIN — METOCLOPRAMIDE HYDROCHLORIDE 5 MG: 5 SOLUTION ORAL at 10:03

## 2023-03-18 RX ADMIN — PANTOPRAZOLE SODIUM 40 MG: 40 INJECTION, POWDER, FOR SOLUTION INTRAVENOUS at 08:25

## 2023-03-18 RX ADMIN — HEPARIN SODIUM 5000 UNITS: 5000 INJECTION INTRAVENOUS; SUBCUTANEOUS at 13:05

## 2023-03-18 RX ADMIN — THIAMINE HYDROCHLORIDE 200 MG: 100 INJECTION, SOLUTION INTRAMUSCULAR; INTRAVENOUS at 10:03

## 2023-03-18 RX ADMIN — CARVEDILOL 3.12 MG: 3.12 TABLET, FILM COATED ORAL at 17:14

## 2023-03-18 RX ADMIN — MEROPENEM 1000 MG: 1 INJECTION, POWDER, FOR SOLUTION INTRAVENOUS at 17:14

## 2023-03-18 RX ADMIN — HEPARIN SODIUM 5000 UNITS: 5000 INJECTION INTRAVENOUS; SUBCUTANEOUS at 22:22

## 2023-03-18 RX ADMIN — METOCLOPRAMIDE HYDROCHLORIDE 5 MG: 5 SOLUTION ORAL at 17:14

## 2023-03-18 RX ADMIN — MEROPENEM 1000 MG: 1 INJECTION, POWDER, FOR SOLUTION INTRAVENOUS at 08:25

## 2023-03-18 ASSESSMENT — PAIN SCALES - WONG BAKER
WONGBAKER_NUMERICALRESPONSE: 2
WONGBAKER_NUMERICALRESPONSE: 0

## 2023-03-18 ASSESSMENT — PAIN SCALES - GENERAL
PAINLEVEL_OUTOF10: 0
PAINLEVEL_OUTOF10: 0

## 2023-03-18 ASSESSMENT — PAIN - FUNCTIONAL ASSESSMENT: PAIN_FUNCTIONAL_ASSESSMENT: PREVENTS OR INTERFERES SOME ACTIVE ACTIVITIES AND ADLS

## 2023-03-18 ASSESSMENT — PAIN DESCRIPTION - DESCRIPTORS: DESCRIPTORS: DISCOMFORT

## 2023-03-18 ASSESSMENT — PAIN DESCRIPTION - LOCATION: LOCATION: ABDOMEN

## 2023-03-18 ASSESSMENT — PAIN DESCRIPTION - ORIENTATION: ORIENTATION: ANTERIOR

## 2023-03-18 ASSESSMENT — PAIN DESCRIPTION - ONSET: ONSET: GRADUAL

## 2023-03-18 ASSESSMENT — PAIN DESCRIPTION - PAIN TYPE: TYPE: ACUTE PAIN

## 2023-03-18 NOTE — CONSULTS
[facility]  Pulmonary, Critical Care, and Sleep Medicine    Name: Latoya Mack MRN: [id number]   : 1955 Hospital: [facility]   Date: 3/3/2023        New Consult      IMPRESSION:   Acute Pancreatitis:  due to alcohol use, lipase 12.2K,   Ileus  Acute hyperkalemia  Hyperglycemia  Ketonuria  Acute kidney injury:  Suspect due to ATN  Lactic acidosis  SIRS  Hx of hypertension on rx lisinopril  Hx of prediabetes, on Rx metformin  Hx of Alcohol use, 2 shots daily, Tox screen positive for alcohol 5  Hx of GERD     Patient Active Problem List   Diagnosis    Prediabetes    GERD (gastroesophageal reflux disease)    Severe obstructive sleep apnea    Restless legs syndrome (RLS)    Hypertension    Pancreatitis, reflux        RECOMMENDATIONS:   Neuro:   - PRN pain medication.   - High dose Thiamine  - Watch for withdrawal symptoms and initiate CIWA protocol when indicated  Pulm:   - ABG  - Head of the bed at 30' all times. CVS :  - Aim MAP >65mmHg  - Check cardiac panel  Fluids:  - IVF, NS  GI:   - NGT set to LIS, for abdominal decompression,   - SUP, Trend LFTs, Zofran PRN for N/V  - Diet/NPO  - Daily KUB until patient is sufficiently decompressed  Renal:    - MISTY: Nephrology following  -  Alvares for bladder decompression  - Trend Renal indices, Strict Is/Os  - Bicarb gtt for non gap metabolic acidosis    Hem/Onc:   - Monitor for s/o active bleeding.   - SC Heparin inplace of levonox due to MISTY  I/D:  - Lactic acid ordered- initial and repeat Q4hrs till normalized. -Trend WBCs and temperature curve. Endocrine:   - Q6 glucoses, SSI. Metabolic:    - Clinical management of hyperkalemia  - Daily BMP, mag, phos. - Trend lytes, replace as needed. Musc/Skin:   - no acute issues, wound care  OB/GYN: n/a  Full Code No additional code details       Best practice : APPLICABLE TO PATIENT    Glycemic control  IHI ICU bundles:    Alvares Bundle Followed  [facility] Vent patients-      Sress ulcer
Comprehensive Nutrition Assessment    Type and Reason for Visit:  Reassess, Consult, NPO/Clear Liquid    Nutrition Recommendations/Plan:    Provide parenteral nutrition using standard premixed product until patient is able to tolerate adequate intake of oral diet. and adequate enteral nutrition. Start PN at 20:00 tonight. See below for order contents/details. Lipids provided Monday, Wednesday, and Friday only  2. Continue oral thiamine and multivitamin supplementation. Continue to monitor readiness for nutrition support, weight, labs, and plan of care during admission. Parenteral Nutrition Order:  Next bag:        Malnutrition Assessment:  Malnutrition Status:  Mild malnutrition (03/16/23 1325)    Context:  Acute Illness     Findings of the 6 clinical characteristics of malnutrition:  Energy Intake:  50% or less of estimated energy requirements for 5 or more days  Weight Loss:  Unable to assess     Body Fat Loss:  Unable to assess     Muscle Mass Loss:  Unable to assess    Fluid Accumulation:  Mild     Strength:  Not Performed    Nutrition Assessment:    Pt presented with abd pain, n/v; pancreatitis 2/2 alcohol. Pt majority NPO/liquids since admit, PO diet changed from solids to NPO 3/11 then from clears to full liquids 3/14. Per surgery 3/16: pt not feeling well, having more distention and nausea; changed pt to NPO and ordered CT scan of abd/pelvis. CT scan 3/16 showed increasing necrotizing pancreatitis. Plan to start PPN today. K and Na low today; discussed with MD. Plan to add Na and K in PN bag for tonight; MD also to replace K outside of PN today. Noted pt awaiting transfer to [address] [place] [address]. Nutrition Related Findings:    BM 3/18. Pertinent Meds: folic acid, reglan, multivitamin, thiamine, SSI, lantus (currently held). Pertinent Labs: Na 135,  K 3.1,  Triglycerides 197,  prealbumin <3.    POC Glucose 188-264 mg/dl x 24 hrs Wound Type: None         Current Nutrition Intake & Therapies:
[email or web address]  [phone]    GASTROENTEROLOGY CONSULT          Impression:   1. Acute pancreatitis - 2d of n/v, periumbilical pain before 3/2 ED visit w/ lipase 12,204, nml LFT, nml TRG, ethanol level elevated; CT w/ acute pancreatitis w/o associated abscess/pseudocyst. Admits to one serving of liquor daily. First episode of pancreatitis. Last episode of vomiting and most recent BM was 3/1. He is currently passing gas, some improvement of pain w/ meds. 2. Periumbilical pain, n/v - secondary to #1  3. MISTY  4. HTN  5. GERD   6. HTN  7. Severe MEAGAN  8. Pre-DM  9. Hx of colon polyps - last colo 10/2017 w/ SSA polyps w/ 5yr recall. Pt reports more recent colo 2mo ago w/ Dr. Jose Fernando w/ polyps noted. Plan:     Aggressive IV fluid hydration. LR favored. Monitor I/U.   KUB ordered to eval for Ileus w/ abdominal distention. Can continue full liquid diet as tolerated, NPO if symptomatic. U.S. ordered to evaluate for cholelithiasis   Discussed etoh cessation as this is the likely culprit. Antiemetics, pain control, further medical management per primary team.      Chief Complaint: acute pancreatitis, abd pain, n/v      HPI:  Luly Morrell is a 75 y.o. male w/ PMH HTN, prediabetes, MEAGAN, and GERD presented to the ED 3/2 with n/v, and abdominal pain x 2 days. Reported severe periumbilical abd pain w/ abrupt onset. Last episode of vomiting and most recent BM was 3/1. He is currently passing gas. Admits to one serving of liquor daily. Denies history of pancreatitis in past. Denies hematemesis or hematochezia.  ED workup revealed WBC nml, H/H nml,K 3.1, Cr 1.37, lipase 12,204, AST/ALT/AP/TB nml, TRG nml, ethanol level elevated; CT w/ acute pancreatitis w/o associated abscess/pseudocyst.     PMH:   Past Medical History:   Diagnosis Date    Allergic rhinitis     GERD (gastroesophageal reflux disease)     GERD (gastroesophageal reflux disease)     Hypertension     Insomnia 02/23/2018    External report    Nocturia
Signs of abdominal compartment syndrome (with diminished UOP)  - Sustained hypotension (MAP <65) not responsive to volume challenge      Full Code No additional code details             Subjective/History: This patient has been seen and evaluated at the request of Dr. Heavenly Chaidez for Acute pancreatitis. Patient is a 75 y.o. male with a PMH significant for HTN, DM who presented to the [facility] ER on 03/02 with 94/48 periumbilical pain. He had an acute onset of abdominal pain with associated nausea and vomiting on Thursday. He reported falling on his emesis on Thursday, sustaining an abration in the process. 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 He has a surgical history of repair of a ruptured appendix at age 10 and an abdominal hernia repair at age 9, he  denies any other  surgeries in his adulthood. He denies fevers or chills or any other recent illness. CT abdomen is significant for acute pancreatitis without abscess of psuedocyst.    03/04/23  Reports improvement in pain, nausea, and ability to take PO today compared to yesterday. Feels overall anxious about hospital stay but not tremulous and no prior history of alcohol withdrawal symptoms. Passing gas, feels abdominal fullness and bloating. Past Medical History:   Diagnosis Date    Allergic rhinitis     GERD (gastroesophageal reflux disease)     GERD (gastroesophageal reflux disease)     Hypertension     Insomnia 02/23/2018    External report    Nocturia     Prediabetes     Restless legs syndrome (RLS) 02/23/2018    External report    Severe obstructive sleep apnea 02/23/2018    External report        Past Surgical History:   Procedure Laterality Date    APPENDECTOMY      HERNIA REPAIR      AR ABDOMEN SURGERY PROC UNLISTED      TONSILLECTOMY          Prior to Admission medications    Medication Sig Start Date End Date Taking?  Authorizing Provider   Saw
injection 5-40 mL  5-40 mL IntraVENous 2 times per day    sodium chloride flush 0.9 % injection 5-40 mL  5-40 mL IntraVENous PRN    polyethylene glycol (GLYCOLAX) packet 17 g  17 g Oral Daily PRN    acetaminophen (TYLENOL) tablet 650 mg  650 mg Oral Q6H PRN    Or    acetaminophen (TYLENOL) suppository 650 mg  650 mg Rectal Q6H PRN    glucose chewable tablet 16 g  4 tablet Oral PRN    dextrose bolus 10% 125 mL  125 mL IntraVENous PRN    Or    dextrose bolus 10% 250 mL  250 mL IntraVENous PRN    glucagon (rDNA) injection 1 mg  1 mg SubCUTAneous PRN    dextrose 10 % infusion   IntraVENous Continuous PRN    insulin lispro (HUMALOG) injection vial 0-8 Units  0-8 Units SubCUTAneous TID WC    insulin lispro (HUMALOG) injection vial 0-4 Units  0-4 Units SubCUTAneous Nightly    HYDROmorphone (DILAUDID) injection 0.5 mg  0.5 mg IntraVENous Once    aluminum & magnesium hydroxide-simethicone (MAALOX) 200-200-20 MG/5ML suspension 30 mL  30 mL Oral Q6H PRN    pantoprazole (PROTONIX) injection 40 mg  40 mg IntraVENous Daily       Review of Systems:   Pertinent items are noted in HPI. Data Review:    Labs: Results:       Chemistry Recent Labs     03/03/23  0015 03/03/23  1444 03/03/23  2200 03/04/23  0540 03/04/23  1228   *   < > 135* 136  134* 134*   K 5.6*   < > 4.5 4.5  4.4 4.2      < > 107 106  105 101   CO2 22   < > 24 24  24 30   BUNCRER 24*   < > 30* 28*  30* 27*   CREATININE 1.53*   < > 1.56* 1.43*  1.41* 1.39*   CALCIUM 8.5   < > 8.1* 7.4*  7.5* 7.1*   BILITOT 1.2*  --  1.5* 1.9*  --    PROT 6.6  --  6.2* 5.7*  --    LABALBU 3.6  --  2.9* 2.6*  2.6* 2.4*   GLOB 3.0  --  3.3 3.1  --     < > = values in this interval not displayed.       PTH  Lab Results   Component Value Date/Time    PHOS 2.3 03/04/2023 12:28 PM      CBC w/Diff Recent Labs     03/02/23  1250 03/03/23  0015 03/04/23  0540   WBC 10.0 9.3 7.6   RBC 4.98 5.36 3.94*   HGB 13.7 14.7 11.0*   HCT 42.0 45.0 32.1*    250 114*
tablet Oral Daily    [Held by provider] insulin glargine (LANTUS) injection vial 10 Units  10 Units SubCUTAneous Nightly    cloNIDine (CATAPRES) 0.1 MG/24HR 1 patch  1 patch TransDERmal Weekly    hydrALAZINE (APRESOLINE) injection 10 mg  10 mg IntraVENous Q6H PRN    [Held by provider] carvedilol (COREG) tablet 3.125 mg  3.125 mg Oral BID WC    LORazepam (ATIVAN) tablet 1 mg  1 mg Oral Q1H PRN    Or    LORazepam (ATIVAN) injection 1 mg  1 mg IntraVENous Q1H PRN    Or    LORazepam (ATIVAN) tablet 2 mg  2 mg Oral Q1H PRN    Or    LORazepam (ATIVAN) injection 2 mg  2 mg IntraVENous Q1H PRN    Or    LORazepam (ATIVAN) tablet 3 mg  3 mg Oral Q1H PRN    Or    LORazepam (ATIVAN) injection 3 mg  3 mg IntraVENous Q1H PRN    Or    LORazepam (ATIVAN) tablet 4 mg  4 mg Oral Q1H PRN    Or    LORazepam (ATIVAN) injection 4 mg  4 mg IntraVENous Q1H PRN    ondansetron (ZOFRAN) injection 4 mg  4 mg IntraVENous Q6H PRN    heparin (porcine) injection 5,000 Units  5,000 Units SubCUTAneous 3 times per day    [Held by provider] sodium chloride flush 0.9 % injection 5-40 mL  5-40 mL IntraVENous 2 times per day    sodium chloride flush 0.9 % injection 5-40 mL  5-40 mL IntraVENous PRN    polyethylene glycol (GLYCOLAX) packet 17 g  17 g Oral Daily PRN    acetaminophen (TYLENOL) tablet 650 mg  650 mg Oral Q6H PRN    Or    acetaminophen (TYLENOL) suppository 650 mg  650 mg Rectal Q6H PRN    glucose chewable tablet 16 g  4 tablet Oral PRN    dextrose bolus 10% 125 mL  125 mL IntraVENous PRN    Or    dextrose bolus 10% 250 mL  250 mL IntraVENous PRN    glucagon (rDNA) injection 1 mg  1 mg SubCUTAneous PRN    dextrose 10 % infusion   IntraVENous Continuous PRN    aluminum & magnesium hydroxide-simethicone (MAALOX) 200-200-20 MG/5ML suspension 30 mL  30 mL Oral Q6H PRN    pantoprazole (PROTONIX) injection 40 mg  40 mg IntraVENous Daily      Allergies   Allergen Reactions    Nifedipine Other (See Comments)     Facial redness    Sulfa Antibiotics
cocci IN CLUSTERS            RARE GRAM POSITIVE RODS        Culture       LIGHT Klebsiella pneumoniae                  LIGHT POSSIBLE Staphylococcus aureus ISOLATION IN PROGRESS                  MODERATE Yeast, (apparent Candida albicans/Candida dubliniensis)                  MODERATE NORMAL RESPIRATORY SAJI          Susceptibility        Klebsiella pneumoniae      BACTERIAL SUSCEPTIBILITY PANEL SHELLY (Preliminary)      amikacin <=2 ug/mL Sensitive      ampicillin >=32 ug/mL Resistant      ampicillin-sulbactam 4 ug/mL Sensitive      ceFAZolin <=4 ug/mL Sensitive      cefepime <=1 ug/mL Sensitive      cefOXitin <=4 ug/mL Sensitive      cefTAZidime <=1 ug/mL Sensitive      cefTRIAXone <=1 ug/mL Sensitive      ciprofloxacin <=0.25 ug/mL Sensitive      gentamicin <=1 ug/mL Sensitive      levofloxacin <=0.12 ug/mL Sensitive      meropenem <=0.25 ug/mL Sensitive      piperacillin-tazobactam <=4 ug/mL Sensitive      tobramycin <=1 ug/mL Sensitive      trimethoprim-sulfamethoxazole <=20 ug/mL Sensitive                           Culture, Blood 1 [[id number]] Collected: 03/09/23 2221    Order Status: Completed Specimen: Blood Updated: 03/12/23 0657     Special Requests NO SPECIAL REQUESTS        Culture NO GROWTH 3 DAYS       Culture, Blood 2 [[id number]] Collected: 03/09/23 2211    Order Status: Completed Specimen: Blood Updated: 03/12/23 0657     Special Requests NO SPECIAL REQUESTS        Culture NO GROWTH 3 DAYS                   RADIOLOGY:    All available imaging studies/reports in [facility] for this admission were reviewed      Disclaimer: Sections of this note are dictated utilizing voice recognition software, which may have resulted in some phonetic based errors in grammar and contents. Even though attempts were made to correct all the mistakes, some may have been missed, and remained in the body of the document. If questions arise, please contact our department.     Dr. Angelica Bowen, Infectious Disease

## 2023-03-19 LAB
ALBUMIN SERPL-MCNC: 1.3 G/DL (ref 3.4–5)
ALBUMIN/GLOB SERPL: 0.4 (ref 0.8–1.7)
ALP SERPL-CCNC: 167 U/L (ref 45–117)
ALT SERPL-CCNC: 22 U/L (ref 16–61)
ANION GAP SERPL CALC-SCNC: 7 MMOL/L (ref 3–18)
AST SERPL-CCNC: 48 U/L (ref 10–38)
BILIRUB DIRECT SERPL-MCNC: 4.7 MG/DL (ref 0–0.2)
BILIRUB SERPL-MCNC: 6.5 MG/DL (ref 0.2–1)
BUN SERPL-MCNC: 28 MG/DL (ref 7–18)
BUN/CREAT SERPL: 18 (ref 12–20)
CALCIUM SERPL-MCNC: 7.4 MG/DL (ref 8.5–10.1)
CHLORIDE SERPL-SCNC: 103 MMOL/L (ref 100–111)
CO2 SERPL-SCNC: 25 MMOL/L (ref 21–32)
CREAT SERPL-MCNC: 1.55 MG/DL (ref 0.6–1.3)
GLOBULIN SER CALC-MCNC: 3.7 G/DL (ref 2–4)
GLUCOSE BLD STRIP.AUTO-MCNC: 263 MG/DL (ref 70–110)
GLUCOSE BLD STRIP.AUTO-MCNC: 270 MG/DL (ref 70–110)
GLUCOSE BLD STRIP.AUTO-MCNC: 273 MG/DL (ref 70–110)
GLUCOSE BLD STRIP.AUTO-MCNC: 279 MG/DL (ref 70–110)
GLUCOSE BLD STRIP.AUTO-MCNC: 298 MG/DL (ref 70–110)
GLUCOSE BLD STRIP.AUTO-MCNC: 300 MG/DL (ref 70–110)
GLUCOSE BLD STRIP.AUTO-MCNC: 307 MG/DL (ref 70–110)
GLUCOSE SERPL-MCNC: 255 MG/DL (ref 74–99)
LIPASE SERPL-CCNC: 63 U/L (ref 73–393)
MAGNESIUM SERPL-MCNC: 2 MG/DL (ref 1.6–2.6)
PHOSPHATE SERPL-MCNC: 2.6 MG/DL (ref 2.5–4.9)
POTASSIUM SERPL-SCNC: 3.3 MMOL/L (ref 3.5–5.5)
PROT SERPL-MCNC: 5 G/DL (ref 6.4–8.2)
SODIUM SERPL-SCNC: 135 MMOL/L (ref 136–145)

## 2023-03-19 PROCEDURE — 6360000002 HC RX W HCPCS: Performed by: STUDENT IN AN ORGANIZED HEALTH CARE EDUCATION/TRAINING PROGRAM

## 2023-03-19 PROCEDURE — 2580000003 HC RX 258: Performed by: HOSPITALIST

## 2023-03-19 PROCEDURE — 94761 N-INVAS EAR/PLS OXIMETRY MLT: CPT

## 2023-03-19 PROCEDURE — 6370000000 HC RX 637 (ALT 250 FOR IP): Performed by: STUDENT IN AN ORGANIZED HEALTH CARE EDUCATION/TRAINING PROGRAM

## 2023-03-19 PROCEDURE — 6370000000 HC RX 637 (ALT 250 FOR IP): Performed by: INTERNAL MEDICINE

## 2023-03-19 PROCEDURE — 6360000002 HC RX W HCPCS: Performed by: HOSPITALIST

## 2023-03-19 PROCEDURE — 84100 ASSAY OF PHOSPHORUS: CPT

## 2023-03-19 PROCEDURE — 2140000001 HC CVICU INTERMEDIATE R&B

## 2023-03-19 PROCEDURE — 80048 BASIC METABOLIC PNL TOTAL CA: CPT

## 2023-03-19 PROCEDURE — 6370000000 HC RX 637 (ALT 250 FOR IP): Performed by: HOSPITALIST

## 2023-03-19 PROCEDURE — 80076 HEPATIC FUNCTION PANEL: CPT

## 2023-03-19 PROCEDURE — 36415 COLL VENOUS BLD VENIPUNCTURE: CPT

## 2023-03-19 PROCEDURE — C9113 INJ PANTOPRAZOLE SODIUM, VIA: HCPCS | Performed by: STUDENT IN AN ORGANIZED HEALTH CARE EDUCATION/TRAINING PROGRAM

## 2023-03-19 PROCEDURE — 6360000002 HC RX W HCPCS

## 2023-03-19 PROCEDURE — 2500000003 HC RX 250 WO HCPCS: Performed by: HOSPITALIST

## 2023-03-19 PROCEDURE — 83690 ASSAY OF LIPASE: CPT

## 2023-03-19 PROCEDURE — 83735 ASSAY OF MAGNESIUM: CPT

## 2023-03-19 PROCEDURE — 99232 SBSQ HOSP IP/OBS MODERATE 35: CPT | Performed by: HOSPITALIST

## 2023-03-19 PROCEDURE — 82962 GLUCOSE BLOOD TEST: CPT

## 2023-03-19 RX ORDER — POTASSIUM CHLORIDE 7.45 MG/ML
10 INJECTION INTRAVENOUS
Status: DISPENSED | OUTPATIENT
Start: 2023-03-19 | End: 2023-03-19

## 2023-03-19 RX ADMIN — POTASSIUM CHLORIDE 10 MEQ: 7.46 INJECTION, SOLUTION INTRAVENOUS at 11:27

## 2023-03-19 RX ADMIN — INSULIN LISPRO 4 UNITS: 100 INJECTION, SOLUTION INTRAVENOUS; SUBCUTANEOUS at 12:16

## 2023-03-19 RX ADMIN — SODIUM ACETATE: 164 INJECTION, SOLUTION, CONCENTRATE INTRAVENOUS at 20:25

## 2023-03-19 RX ADMIN — CARVEDILOL 3.12 MG: 3.12 TABLET, FILM COATED ORAL at 08:20

## 2023-03-19 RX ADMIN — INSULIN LISPRO 4 UNITS: 100 INJECTION, SOLUTION INTRAVENOUS; SUBCUTANEOUS at 06:36

## 2023-03-19 RX ADMIN — HEPARIN SODIUM 5000 UNITS: 5000 INJECTION INTRAVENOUS; SUBCUTANEOUS at 22:29

## 2023-03-19 RX ADMIN — MEROPENEM 1000 MG: 1 INJECTION, POWDER, FOR SOLUTION INTRAVENOUS at 08:18

## 2023-03-19 RX ADMIN — INSULIN LISPRO 4 UNITS: 100 INJECTION, SOLUTION INTRAVENOUS; SUBCUTANEOUS at 00:08

## 2023-03-19 RX ADMIN — PANTOPRAZOLE SODIUM 40 MG: 40 INJECTION, POWDER, FOR SOLUTION INTRAVENOUS at 08:20

## 2023-03-19 RX ADMIN — MEROPENEM 1000 MG: 1 INJECTION, POWDER, FOR SOLUTION INTRAVENOUS at 17:07

## 2023-03-19 RX ADMIN — INSULIN LISPRO 4 UNITS: 100 INJECTION, SOLUTION INTRAVENOUS; SUBCUTANEOUS at 17:39

## 2023-03-19 RX ADMIN — THERA TABS 1 TABLET: TAB at 08:20

## 2023-03-19 RX ADMIN — METOCLOPRAMIDE HYDROCHLORIDE 5 MG: 5 SOLUTION ORAL at 11:27

## 2023-03-19 RX ADMIN — FOLIC ACID 1 MG: 1 TABLET ORAL at 08:20

## 2023-03-19 RX ADMIN — POTASSIUM CHLORIDE 10 MEQ: 7.46 INJECTION, SOLUTION INTRAVENOUS at 13:55

## 2023-03-19 RX ADMIN — METOCLOPRAMIDE HYDROCHLORIDE 5 MG: 5 SOLUTION ORAL at 17:06

## 2023-03-19 RX ADMIN — CARVEDILOL 3.12 MG: 3.12 TABLET, FILM COATED ORAL at 17:07

## 2023-03-19 RX ADMIN — THIAMINE HYDROCHLORIDE 200 MG: 100 INJECTION, SOLUTION INTRAMUSCULAR; INTRAVENOUS at 08:53

## 2023-03-19 RX ADMIN — MEROPENEM 1000 MG: 1 INJECTION, POWDER, FOR SOLUTION INTRAVENOUS at 00:09

## 2023-03-19 RX ADMIN — HEPARIN SODIUM 5000 UNITS: 5000 INJECTION INTRAVENOUS; SUBCUTANEOUS at 06:26

## 2023-03-19 RX ADMIN — METOCLOPRAMIDE HYDROCHLORIDE 5 MG: 5 SOLUTION ORAL at 06:25

## 2023-03-19 RX ADMIN — POTASSIUM CHLORIDE 10 MEQ: 7.46 INJECTION, SOLUTION INTRAVENOUS at 12:25

## 2023-03-19 ASSESSMENT — PAIN SCALES - WONG BAKER: WONGBAKER_NUMERICALRESPONSE: 2

## 2023-03-20 LAB
ALBUMIN SERPL-MCNC: 1.4 G/DL (ref 3.4–5)
ALBUMIN/GLOB SERPL: 0.4 (ref 0.8–1.7)
ALP SERPL-CCNC: 202 U/L (ref 45–117)
ALT SERPL-CCNC: 30 U/L (ref 16–61)
ANION GAP SERPL CALC-SCNC: 6 MMOL/L (ref 3–18)
AST SERPL-CCNC: 52 U/L (ref 10–38)
BILIRUB SERPL-MCNC: 6.9 MG/DL (ref 0.2–1)
BUN SERPL-MCNC: 31 MG/DL (ref 7–18)
BUN/CREAT SERPL: 22 (ref 12–20)
CA-I SERPL-SCNC: 1.04 MMOL/L (ref 1.15–1.33)
CALCIUM SERPL-MCNC: 7.3 MG/DL (ref 8.5–10.1)
CHLORIDE SERPL-SCNC: 103 MMOL/L (ref 100–111)
CO2 SERPL-SCNC: 25 MMOL/L (ref 21–32)
CREAT SERPL-MCNC: 1.44 MG/DL (ref 0.6–1.3)
ERYTHROCYTE [DISTWIDTH] IN BLOOD BY AUTOMATED COUNT: 15.9 % (ref 11.6–14.5)
GLOBULIN SER CALC-MCNC: 3.7 G/DL (ref 2–4)
GLUCOSE BLD STRIP.AUTO-MCNC: 255 MG/DL (ref 70–110)
GLUCOSE BLD STRIP.AUTO-MCNC: 280 MG/DL (ref 70–110)
GLUCOSE BLD STRIP.AUTO-MCNC: 288 MG/DL (ref 70–110)
GLUCOSE BLD STRIP.AUTO-MCNC: 290 MG/DL (ref 70–110)
GLUCOSE BLD STRIP.AUTO-MCNC: 311 MG/DL (ref 70–110)
GLUCOSE SERPL-MCNC: 298 MG/DL (ref 74–99)
HCT VFR BLD AUTO: 21.9 % (ref 36–48)
HGB BLD-MCNC: 7.2 G/DL (ref 13–16)
MAGNESIUM SERPL-MCNC: 1.9 MG/DL (ref 1.6–2.6)
MCH RBC QN AUTO: 26.7 PG (ref 24–34)
MCHC RBC AUTO-ENTMCNC: 32.9 G/DL (ref 31–37)
MCV RBC AUTO: 81.1 FL (ref 78–100)
NRBC # BLD: 0 K/UL (ref 0–0.01)
NRBC BLD-RTO: 0 PER 100 WBC
PHOSPHATE SERPL-MCNC: 1.5 MG/DL (ref 2.5–4.9)
PLATELET # BLD AUTO: 328 K/UL (ref 135–420)
PMV BLD AUTO: 9.3 FL (ref 9.2–11.8)
POTASSIUM SERPL-SCNC: 3.7 MMOL/L (ref 3.5–5.5)
PROT SERPL-MCNC: 5.1 G/DL (ref 6.4–8.2)
RBC # BLD AUTO: 2.7 M/UL (ref 4.35–5.65)
SODIUM SERPL-SCNC: 134 MMOL/L (ref 136–145)
WBC # BLD AUTO: 6.6 K/UL (ref 4.6–13.2)

## 2023-03-20 PROCEDURE — 85027 COMPLETE CBC AUTOMATED: CPT

## 2023-03-20 PROCEDURE — 84100 ASSAY OF PHOSPHORUS: CPT

## 2023-03-20 PROCEDURE — 6370000000 HC RX 637 (ALT 250 FOR IP): Performed by: STUDENT IN AN ORGANIZED HEALTH CARE EDUCATION/TRAINING PROGRAM

## 2023-03-20 PROCEDURE — 99232 SBSQ HOSP IP/OBS MODERATE 35: CPT | Performed by: HOSPITALIST

## 2023-03-20 PROCEDURE — 83735 ASSAY OF MAGNESIUM: CPT

## 2023-03-20 PROCEDURE — 82330 ASSAY OF CALCIUM: CPT

## 2023-03-20 PROCEDURE — 2140000001 HC CVICU INTERMEDIATE R&B

## 2023-03-20 PROCEDURE — 99233 SBSQ HOSP IP/OBS HIGH 50: CPT | Performed by: SURGERY

## 2023-03-20 PROCEDURE — 36569 INSJ PICC 5 YR+ W/O IMAGING: CPT

## 2023-03-20 PROCEDURE — 6360000002 HC RX W HCPCS: Performed by: HOSPITALIST

## 2023-03-20 PROCEDURE — 94761 N-INVAS EAR/PLS OXIMETRY MLT: CPT

## 2023-03-20 PROCEDURE — 6370000000 HC RX 637 (ALT 250 FOR IP): Performed by: INTERNAL MEDICINE

## 2023-03-20 PROCEDURE — 36415 COLL VENOUS BLD VENIPUNCTURE: CPT

## 2023-03-20 PROCEDURE — 2580000003 HC RX 258: Performed by: STUDENT IN AN ORGANIZED HEALTH CARE EDUCATION/TRAINING PROGRAM

## 2023-03-20 PROCEDURE — C9113 INJ PANTOPRAZOLE SODIUM, VIA: HCPCS | Performed by: STUDENT IN AN ORGANIZED HEALTH CARE EDUCATION/TRAINING PROGRAM

## 2023-03-20 PROCEDURE — 2500000003 HC RX 250 WO HCPCS: Performed by: HOSPITALIST

## 2023-03-20 PROCEDURE — 6360000002 HC RX W HCPCS

## 2023-03-20 PROCEDURE — 2580000003 HC RX 258: Performed by: HOSPITALIST

## 2023-03-20 PROCEDURE — 82962 GLUCOSE BLOOD TEST: CPT

## 2023-03-20 PROCEDURE — 80053 COMPREHEN METABOLIC PANEL: CPT

## 2023-03-20 PROCEDURE — 02HV33Z INSERTION OF INFUSION DEVICE INTO SUPERIOR VENA CAVA, PERCUTANEOUS APPROACH: ICD-10-PCS | Performed by: HOSPITALIST

## 2023-03-20 PROCEDURE — 6360000002 HC RX W HCPCS: Performed by: STUDENT IN AN ORGANIZED HEALTH CARE EDUCATION/TRAINING PROGRAM

## 2023-03-20 RX ADMIN — MEROPENEM 1000 MG: 1 INJECTION INTRAVENOUS at 00:11

## 2023-03-20 RX ADMIN — CARVEDILOL 3.12 MG: 3.12 TABLET, FILM COATED ORAL at 17:57

## 2023-03-20 RX ADMIN — SENNOSIDES AND DOCUSATE SODIUM 2 TABLET: 50; 8.6 TABLET ORAL at 09:44

## 2023-03-20 RX ADMIN — PANTOPRAZOLE SODIUM 40 MG: 40 INJECTION, POWDER, FOR SOLUTION INTRAVENOUS at 09:45

## 2023-03-20 RX ADMIN — SODIUM PHOSPHATE, MONOBASIC, MONOHYDRATE AND SODIUM PHOSPHATE, DIBASIC, ANHYDROUS 12 MMOL: 142; 276 INJECTION, SOLUTION INTRAVENOUS at 15:59

## 2023-03-20 RX ADMIN — METOCLOPRAMIDE HYDROCHLORIDE 5 MG: 5 SOLUTION ORAL at 17:57

## 2023-03-20 RX ADMIN — HEPARIN SODIUM 5000 UNITS: 5000 INJECTION INTRAVENOUS; SUBCUTANEOUS at 22:02

## 2023-03-20 RX ADMIN — HEPARIN SODIUM 5000 UNITS: 5000 INJECTION INTRAVENOUS; SUBCUTANEOUS at 14:36

## 2023-03-20 RX ADMIN — INSULIN LISPRO 4 UNITS: 100 INJECTION, SOLUTION INTRAVENOUS; SUBCUTANEOUS at 06:27

## 2023-03-20 RX ADMIN — INSULIN LISPRO 4 UNITS: 100 INJECTION, SOLUTION INTRAVENOUS; SUBCUTANEOUS at 00:19

## 2023-03-20 RX ADMIN — MEROPENEM 1000 MG: 1 INJECTION INTRAVENOUS at 18:35

## 2023-03-20 RX ADMIN — SODIUM ACETATE: 164 INJECTION, SOLUTION, CONCENTRATE INTRAVENOUS at 20:36

## 2023-03-20 RX ADMIN — INSULIN GLARGINE 10 UNITS: 100 INJECTION, SOLUTION SUBCUTANEOUS at 20:37

## 2023-03-20 RX ADMIN — CARVEDILOL 3.12 MG: 3.12 TABLET, FILM COATED ORAL at 09:46

## 2023-03-20 RX ADMIN — FOLIC ACID 1 MG: 1 TABLET ORAL at 09:44

## 2023-03-20 RX ADMIN — MEROPENEM 1000 MG: 1 INJECTION INTRAVENOUS at 11:01

## 2023-03-20 RX ADMIN — THERA TABS 1 TABLET: TAB at 09:44

## 2023-03-20 RX ADMIN — THIAMINE HYDROCHLORIDE 200 MG: 100 INJECTION, SOLUTION INTRAMUSCULAR; INTRAVENOUS at 12:01

## 2023-03-20 RX ADMIN — INSULIN LISPRO 6 UNITS: 100 INJECTION, SOLUTION INTRAVENOUS; SUBCUTANEOUS at 12:01

## 2023-03-20 RX ADMIN — METOCLOPRAMIDE HYDROCHLORIDE 5 MG: 5 SOLUTION ORAL at 11:08

## 2023-03-20 RX ADMIN — HEPARIN SODIUM 5000 UNITS: 5000 INJECTION INTRAVENOUS; SUBCUTANEOUS at 06:27

## 2023-03-20 RX ADMIN — METOCLOPRAMIDE HYDROCHLORIDE 5 MG: 5 SOLUTION ORAL at 06:34

## 2023-03-20 RX ADMIN — SODIUM CHLORIDE, PRESERVATIVE FREE 10 ML: 5 INJECTION INTRAVENOUS at 09:47

## 2023-03-20 RX ADMIN — INSULIN LISPRO 4 UNITS: 100 INJECTION, SOLUTION INTRAVENOUS; SUBCUTANEOUS at 17:57

## 2023-03-20 ASSESSMENT — PAIN SCALES - GENERAL
PAINLEVEL_OUTOF10: 0
PAINLEVEL_OUTOF10: 0

## 2023-03-21 LAB
ALBUMIN SERPL-MCNC: 1.3 G/DL (ref 3.4–5)
ALBUMIN/GLOB SERPL: 0.4 (ref 0.8–1.7)
ALP SERPL-CCNC: 226 U/L (ref 45–117)
ALT SERPL-CCNC: 40 U/L (ref 16–61)
ANION GAP SERPL CALC-SCNC: 4 MMOL/L (ref 3–18)
AST SERPL-CCNC: 73 U/L (ref 10–38)
BILIRUB SERPL-MCNC: 7.3 MG/DL (ref 0.2–1)
BUN SERPL-MCNC: 30 MG/DL (ref 7–18)
BUN/CREAT SERPL: 24 (ref 12–20)
CALCIUM SERPL-MCNC: 7.2 MG/DL (ref 8.5–10.1)
CHLORIDE SERPL-SCNC: 103 MMOL/L (ref 100–111)
CO2 SERPL-SCNC: 27 MMOL/L (ref 21–32)
CREAT SERPL-MCNC: 1.25 MG/DL (ref 0.6–1.3)
ERYTHROCYTE [DISTWIDTH] IN BLOOD BY AUTOMATED COUNT: 16.3 % (ref 11.6–14.5)
GLOBULIN SER CALC-MCNC: 3.6 G/DL (ref 2–4)
GLUCOSE BLD STRIP.AUTO-MCNC: 257 MG/DL (ref 70–110)
GLUCOSE BLD STRIP.AUTO-MCNC: 273 MG/DL (ref 70–110)
GLUCOSE BLD STRIP.AUTO-MCNC: 286 MG/DL (ref 70–110)
GLUCOSE BLD STRIP.AUTO-MCNC: 302 MG/DL (ref 70–110)
GLUCOSE SERPL-MCNC: 243 MG/DL (ref 74–99)
HCT VFR BLD AUTO: 20.5 % (ref 36–48)
HGB BLD-MCNC: 6.8 G/DL (ref 13–16)
HISTORY CHECK: NORMAL
MAGNESIUM SERPL-MCNC: 1.9 MG/DL (ref 1.6–2.6)
MCH RBC QN AUTO: 26.4 PG (ref 24–34)
MCHC RBC AUTO-ENTMCNC: 33.2 G/DL (ref 31–37)
MCV RBC AUTO: 79.5 FL (ref 78–100)
NRBC # BLD: 0 K/UL (ref 0–0.01)
NRBC BLD-RTO: 0 PER 100 WBC
PHOSPHATE SERPL-MCNC: 2 MG/DL (ref 2.5–4.9)
PLATELET # BLD AUTO: 327 K/UL (ref 135–420)
PMV BLD AUTO: 9.5 FL (ref 9.2–11.8)
POTASSIUM SERPL-SCNC: 3.7 MMOL/L (ref 3.5–5.5)
PROT SERPL-MCNC: 4.9 G/DL (ref 6.4–8.2)
RBC # BLD AUTO: 2.58 M/UL (ref 4.35–5.65)
SODIUM SERPL-SCNC: 134 MMOL/L (ref 136–145)
TRIGL SERPL-MCNC: 256 MG/DL
WBC # BLD AUTO: 6.5 K/UL (ref 4.6–13.2)

## 2023-03-21 PROCEDURE — 2140000001 HC CVICU INTERMEDIATE R&B

## 2023-03-21 PROCEDURE — 2500000003 HC RX 250 WO HCPCS: Performed by: HOSPITALIST

## 2023-03-21 PROCEDURE — 99233 SBSQ HOSP IP/OBS HIGH 50: CPT | Performed by: SURGERY

## 2023-03-21 PROCEDURE — 6360000002 HC RX W HCPCS

## 2023-03-21 PROCEDURE — P9016 RBC LEUKOCYTES REDUCED: HCPCS

## 2023-03-21 PROCEDURE — 84478 ASSAY OF TRIGLYCERIDES: CPT

## 2023-03-21 PROCEDURE — 6360000002 HC RX W HCPCS: Performed by: HOSPITALIST

## 2023-03-21 PROCEDURE — 2580000003 HC RX 258: Performed by: HOSPITALIST

## 2023-03-21 PROCEDURE — 85027 COMPLETE CBC AUTOMATED: CPT

## 2023-03-21 PROCEDURE — 86923 COMPATIBILITY TEST ELECTRIC: CPT

## 2023-03-21 PROCEDURE — 36415 COLL VENOUS BLD VENIPUNCTURE: CPT

## 2023-03-21 PROCEDURE — 36430 TRANSFUSION BLD/BLD COMPNT: CPT

## 2023-03-21 PROCEDURE — 6370000000 HC RX 637 (ALT 250 FOR IP): Performed by: STUDENT IN AN ORGANIZED HEALTH CARE EDUCATION/TRAINING PROGRAM

## 2023-03-21 PROCEDURE — C9113 INJ PANTOPRAZOLE SODIUM, VIA: HCPCS | Performed by: STUDENT IN AN ORGANIZED HEALTH CARE EDUCATION/TRAINING PROGRAM

## 2023-03-21 PROCEDURE — 6360000002 HC RX W HCPCS: Performed by: STUDENT IN AN ORGANIZED HEALTH CARE EDUCATION/TRAINING PROGRAM

## 2023-03-21 PROCEDURE — 80053 COMPREHEN METABOLIC PANEL: CPT

## 2023-03-21 PROCEDURE — 6370000000 HC RX 637 (ALT 250 FOR IP): Performed by: HOSPITALIST

## 2023-03-21 PROCEDURE — 30233N1 TRANSFUSION OF NONAUTOLOGOUS RED BLOOD CELLS INTO PERIPHERAL VEIN, PERCUTANEOUS APPROACH: ICD-10-PCS | Performed by: HOSPITALIST

## 2023-03-21 PROCEDURE — 86900 BLOOD TYPING SEROLOGIC ABO: CPT

## 2023-03-21 PROCEDURE — 83735 ASSAY OF MAGNESIUM: CPT

## 2023-03-21 PROCEDURE — 84100 ASSAY OF PHOSPHORUS: CPT

## 2023-03-21 PROCEDURE — 82962 GLUCOSE BLOOD TEST: CPT

## 2023-03-21 PROCEDURE — 6370000000 HC RX 637 (ALT 250 FOR IP): Performed by: INTERNAL MEDICINE

## 2023-03-21 RX ORDER — SODIUM CHLORIDE 9 MG/ML
INJECTION, SOLUTION INTRAVENOUS PRN
Status: DISCONTINUED | OUTPATIENT
Start: 2023-03-21 | End: 2023-03-22 | Stop reason: HOSPADM

## 2023-03-21 RX ADMIN — INSULIN LISPRO 4 UNITS: 100 INJECTION, SOLUTION INTRAVENOUS; SUBCUTANEOUS at 12:22

## 2023-03-21 RX ADMIN — THIAMINE HYDROCHLORIDE 200 MG: 100 INJECTION, SOLUTION INTRAMUSCULAR; INTRAVENOUS at 09:25

## 2023-03-21 RX ADMIN — MEROPENEM 1000 MG: 1 INJECTION INTRAVENOUS at 02:51

## 2023-03-21 RX ADMIN — SENNOSIDES AND DOCUSATE SODIUM 2 TABLET: 50; 8.6 TABLET ORAL at 08:23

## 2023-03-21 RX ADMIN — INSULIN LISPRO 4 UNITS: 100 INJECTION, SOLUTION INTRAVENOUS; SUBCUTANEOUS at 00:22

## 2023-03-21 RX ADMIN — INSULIN GLARGINE 10 UNITS: 100 INJECTION, SOLUTION SUBCUTANEOUS at 20:43

## 2023-03-21 RX ADMIN — CARVEDILOL 3.12 MG: 3.12 TABLET, FILM COATED ORAL at 09:32

## 2023-03-21 RX ADMIN — MEROPENEM 1000 MG: 1 INJECTION INTRAVENOUS at 10:43

## 2023-03-21 RX ADMIN — SODIUM PHOSPHATE, MONOBASIC, MONOHYDRATE AND SODIUM PHOSPHATE, DIBASIC, ANHYDROUS 9 MMOL: 142; 276 INJECTION, SOLUTION INTRAVENOUS at 14:43

## 2023-03-21 RX ADMIN — THERA TABS 1 TABLET: TAB at 08:23

## 2023-03-21 RX ADMIN — METOCLOPRAMIDE HYDROCHLORIDE 5 MG: 5 SOLUTION ORAL at 08:23

## 2023-03-21 RX ADMIN — METOCLOPRAMIDE HYDROCHLORIDE 5 MG: 5 SOLUTION ORAL at 10:43

## 2023-03-21 RX ADMIN — HEPARIN SODIUM 5000 UNITS: 5000 INJECTION INTRAVENOUS; SUBCUTANEOUS at 13:44

## 2023-03-21 RX ADMIN — LEUCINE, PHENYLALANINE, LYSINE, METHIONINE, ISOLEUCINE, VALINE, HISTIDINE, THREONINE, TRYPTOPHAN, ALANINE, GLYCINE, ARGININE, PROLINE, SERINE, TYROSINE, SODIUM ACETATE, DIBASIC POTASSIUM PHOSPHATE, MAGNESIUM CHLORIDE, SODIUM CHLORIDE, CALCIUM CHLORIDE, DEXTROSE
365; 280; 290; 200; 300; 290; 240; 210; 90; 1035; 515; 575; 340; 250; 20; 340; 261; 51; 59; 33; 20 INJECTION INTRAVENOUS at 20:41

## 2023-03-21 RX ADMIN — TRAMADOL HYDROCHLORIDE 50 MG: 50 TABLET, COATED ORAL at 18:50

## 2023-03-21 RX ADMIN — PANTOPRAZOLE SODIUM 40 MG: 40 INJECTION, POWDER, FOR SOLUTION INTRAVENOUS at 08:23

## 2023-03-21 RX ADMIN — INSULIN LISPRO 4 UNITS: 100 INJECTION, SOLUTION INTRAVENOUS; SUBCUTANEOUS at 06:25

## 2023-03-21 RX ADMIN — HEPARIN SODIUM 5000 UNITS: 5000 INJECTION INTRAVENOUS; SUBCUTANEOUS at 06:06

## 2023-03-21 RX ADMIN — METOCLOPRAMIDE HYDROCHLORIDE 5 MG: 5 SOLUTION ORAL at 16:05

## 2023-03-21 RX ADMIN — CARVEDILOL 3.12 MG: 3.12 TABLET, FILM COATED ORAL at 16:05

## 2023-03-21 RX ADMIN — FOLIC ACID 1 MG: 1 TABLET ORAL at 08:23

## 2023-03-21 RX ADMIN — INSULIN LISPRO 6 UNITS: 100 INJECTION, SOLUTION INTRAVENOUS; SUBCUTANEOUS at 18:50

## 2023-03-21 RX ADMIN — MEROPENEM 1000 MG: 1 INJECTION INTRAVENOUS at 20:43

## 2023-03-21 ASSESSMENT — PAIN DESCRIPTION - LOCATION: LOCATION: BACK

## 2023-03-21 ASSESSMENT — PAIN DESCRIPTION - ORIENTATION: ORIENTATION: RIGHT;MID

## 2023-03-21 ASSESSMENT — PAIN SCALES - GENERAL
PAINLEVEL_OUTOF10: 0
PAINLEVEL_OUTOF10: 6

## 2023-03-21 ASSESSMENT — PAIN DESCRIPTION - DESCRIPTORS: DESCRIPTORS: ACHING

## 2023-03-21 NOTE — PLAN OF CARE
Problem: Discharge Planning  Goal: Discharge to home or other facility with appropriate resources  Outcome: Progressing     Problem: Pain  Goal: Verbalizes/displays adequate comfort level or baseline comfort level  Outcome: Progressing     Problem: Safety - Adult  Goal: Free from fall injury  Outcome: Progressing     Problem: Chronic Conditions and Co-morbidities  Goal: Patient's chronic conditions and co-morbidity symptoms are monitored and maintained or improved  Outcome: Progressing
Problem: Discharge Planning  Goal: Discharge to home or other facility with appropriate resources  Outcome: Progressing     Problem: Pain  Goal: Verbalizes/displays adequate comfort level or baseline comfort level  Outcome: Progressing     Problem: Safety - Adult  Goal: Free from fall injury  Outcome: Progressing     Problem: Chronic Conditions and Co-morbidities  Goal: Patient's chronic conditions and co-morbidity symptoms are monitored and maintained or improved  Outcome: Progressing     Problem: Nutrition Deficit:  Goal: Optimize nutritional status  Outcome: Progressing  Flowsheets (Taken 3/20/2023 1237 by Malena Bliss RD)  Nutrient intake appropriate for improving, restoring, or maintaining nutritional needs:   Monitor oral intake, labs, and treatment plans   Recommend appropriate diets, oral nutritional supplements, and vitamin/mineral supplements   Recommend, monitor, and adjust tube feedings and TPN/PPN based on assessed needs     Problem: Skin/Tissue Integrity  Goal: Absence of new skin breakdown  Description: 1. Monitor for areas of redness and/or skin breakdown  2. Assess vascular access sites hourly  3. Every 4-6 hours minimum:  Change oxygen saturation probe site  4. Every 4-6 hours:  If on nasal continuous positive airway pressure, respiratory therapy assess nares and determine need for appliance change or resting period.   Outcome: Progressing
Problem: Discharge Planning  Goal: Discharge to home or other facility with appropriate resources  Outcome: Progressing     Problem: Pain  Goal: Verbalizes/displays adequate comfort level or baseline comfort level  Outcome: Progressing     Problem: Safety - Adult  Goal: Free from fall injury  Outcome: Progressing     Problem: Chronic Conditions and Co-morbidities  Goal: Patient's chronic conditions and co-morbidity symptoms are monitored and maintained or improved  Outcome: Progressing  Flowsheets (Taken 3/3/2023 2609 by Alexandre Macdonald RN)  Care Plan - Patient's Chronic Conditions and Co-Morbidity Symptoms are Monitored and Maintained or Improved: Monitor and assess patient's chronic conditions and comorbid symptoms for stability, deterioration, or improvement
Problem: Discharge Planning  Goal: Discharge to home or other facility with appropriate resources  Outcome: Progressing  Flowsheets (Taken 3/18/2023 0734 by Heydi Stone RN)  Discharge to home or other facility with appropriate resources: Identify barriers to discharge with patient and caregiver     Problem: Pain  Goal: Verbalizes/displays adequate comfort level or baseline comfort level  Outcome: Progressing     Problem: Safety - Adult  Goal: Free from fall injury  Outcome: Progressing     Problem: Chronic Conditions and Co-morbidities  Goal: Patient's chronic conditions and co-morbidity symptoms are monitored and maintained or improved  Outcome: Progressing  Flowsheets (Taken 3/18/2023 0734 by Heydi Stone RN)  Care Plan - Patient's Chronic Conditions and Co-Morbidity Symptoms are Monitored and Maintained or Improved: Monitor and assess patient's chronic conditions and comorbid symptoms for stability, deterioration, or improvement     Problem: Nutrition Deficit:  Goal: Optimize nutritional status  Outcome: Progressing     Problem: Skin/Tissue Integrity  Goal: Absence of new skin breakdown  Description: 1. Monitor for areas of redness and/or skin breakdown  2. Assess vascular access sites hourly  3. Every 4-6 hours minimum:  Change oxygen saturation probe site  4. Every 4-6 hours:  If on nasal continuous positive airway pressure, respiratory therapy assess nares and determine need for appliance change or resting period.   Outcome: Progressing
Problem: Discharge Planning  Goal: Discharge to home or other facility with appropriate resources  Outcome: Progressing  Flowsheets (Taken 3/19/2023 0727 by Hari Burns, RN)  Discharge to home or other facility with appropriate resources: Identify barriers to discharge with patient and caregiver     Problem: Pain  Goal: Verbalizes/displays adequate comfort level or baseline comfort level  Outcome: Progressing     Problem: Safety - Adult  Goal: Free from fall injury  Outcome: Progressing     Problem: Chronic Conditions and Co-morbidities  Goal: Patient's chronic conditions and co-morbidity symptoms are monitored and maintained or improved  Outcome: Progressing  Flowsheets (Taken 3/19/2023 0727 by Hari Burns, RN)  Care Plan - Patient's Chronic Conditions and Co-Morbidity Symptoms are Monitored and Maintained or Improved: Monitor and assess patient's chronic conditions and comorbid symptoms for stability, deterioration, or improvement     Problem: Nutrition Deficit:  Goal: Optimize nutritional status  Outcome: Progressing     Problem: Skin/Tissue Integrity  Goal: Absence of new skin breakdown  Description: 1. Monitor for areas of redness and/or skin breakdown  2. Assess vascular access sites hourly  3. Every 4-6 hours minimum:  Change oxygen saturation probe site  4. Every 4-6 hours:  If on nasal continuous positive airway pressure, respiratory therapy assess nares and determine need for appliance change or resting period.   Outcome: Progressing
Problem: Discharge Planning  Goal: Discharge to home or other facility with appropriate resources  Outcome: Progressing  Flowsheets (Taken 3/8/2023 2000)  Discharge to home or other facility with appropriate resources: Identify barriers to discharge with patient and caregiver     Problem: Pain  Goal: Verbalizes/displays adequate comfort level or baseline comfort level  Outcome: Progressing     Problem: Safety - Adult  Goal: Free from fall injury  Outcome: Progressing     Problem: Chronic Conditions and Co-morbidities  Goal: Patient's chronic conditions and co-morbidity symptoms are monitored and maintained or improved  Outcome: Progressing  Flowsheets (Taken 3/8/2023 2000)  Care Plan - Patient's Chronic Conditions and Co-Morbidity Symptoms are Monitored and Maintained or Improved: Monitor and assess patient's chronic conditions and comorbid symptoms for stability, deterioration, or improvement     Problem: Nutrition Deficit:  Goal: Optimize nutritional status  Outcome: Progressing
Problem: Occupational Therapy - Adult  Goal: By Discharge: Performs self-care activities at highest level of function for planned discharge setting. See evaluation for individualized goals. Description: Occupational Therapy Goals:  Initiated 3/7/2023 to be met within 7-10 days. 1.  Patient will perform lower body dressing with AE with modified independence. 2.  Patient will perform lower body bathing with AE with supervision/set-up. 3.  Patient will perform bathroom mobility with LRAD with modified independence. 4.  Patient will perform toilet transfers with modified independence. 5.  Patient will perform grooming in stance x 3-5 min and G balance. 6.  Patient will participate in upper extremity therapeutic exercise/activities with supervision/set-up for 8-10 minutes to increase strength/endurance for ADLs. 7.  Patient will utilize energy conservation techniques during functional activities with verbal cues. PLOF:Pt reports independence     Outcome: Completed  OCCUPATIONAL THERAPY RE-EVALUATION/DISCHARGE    Patient: Carol Lugo (64 y.o. male)  Date: 3/15/2023  Primary Diagnosis: Acute pancreatitis, unspecified complication status, unspecified pancreatitis type [K85.90]  Pancreatitis, reflux [K85.80]       Precautions: Seizure, Fall Risk    ASSESSMENT AND RECOMMENDATIONS:    Pt is sitting in recliner upon entry, reports not having a good day. Pt is agreeable to OT re-evaluation, however, declining to perform functional mobility to the BR. Pt reports understanding of LB dressing techniques with AE, which he practiced during previous sessions, denies need to practice again. Pt educated on and issued long handled sponge for LB bathing. Pt verbalized understanding of the proper use of AE.  Pt required Supervision to  preparation for standing ADLs, reports his family will be available to provide supervision for safety after he is DCd from the hospital. Pt educated on EC techniques to utilize
Problem: Occupational Therapy - Adult  Goal: By Discharge: Performs self-care activities at highest level of function for planned discharge setting. See evaluation for individualized goals. Description: Occupational Therapy Goals:  Initiated 3/7/2023 to be met within 7-10 days. 1.  Patient will perform lower body dressing with AE with modified independence. 2.  Patient will perform lower body bathing with AE with supervision/set-up. 3.  Patient will perform bathroom mobility with LRAD with modified independence. 4.  Patient will perform toilet transfers with modified independence. 5.  Patient will perform grooming in stance x 3-5 min and G balance. 6.  Patient will participate in upper extremity therapeutic exercise/activities with supervision/set-up for 8-10 minutes to increase strength/endurance for ADLs. 7.  Patient will utilize energy conservation techniques during functional activities with verbal cues. PLOF:Pt reports independence     Outcome: Progressing       OCCUPATIONAL THERAPY TREATMENT    Patient: Carol Lugo (1 y.o. male)  Date: 3/9/2023  Diagnosis: Acute pancreatitis, unspecified complication status, unspecified pancreatitis type [K85.90]  Pancreatitis, reflux [K85.80] Acute pancreatitis without infection or necrosis    Precautions: Seizure, Fall Risk,    Chart, occupational therapy assessment, plan of care, and goals were reviewed. ASSESSMENT:  Upon entering the room, the patient was supine in bed, alert, and agreeable to participate in OT session. Patient with recent BM, reports feeling a little better but abdominal discomfort still present. Patient with 2L NC donned, O2 remained > 92% however pt with multiple rest breaks taken and slow pace noted for all tasks. Patient educated on energy conservation techniques, pursed lip breathing, and self pacing during daily activities.  Lengthy discussion regarding ADLs and E.C. as pt reports even eating meals are difficult for him d/t
Problem: Pain  Goal: Verbalizes/displays adequate comfort level or baseline comfort level  Outcome: Not Progressing       Problem: Safety - Adult  Goal: Free from fall injury  Outcome: Progressing
Rosalee Leblanc RN  Outcome: Progressing  Flowsheets (Taken 3/9/2023 0830)  Care Plan - Patient's Chronic Conditions and Co-Morbidity Symptoms are Monitored and Maintained or Improved:   Monitor and assess patient's chronic conditions and comorbid symptoms for stability, deterioration, or improvement   Collaborate with multidisciplinary team to address chronic and comorbid conditions and prevent exacerbation or deterioration   Update acute care plan with appropriate goals if chronic or comorbid symptoms are exacerbated and prevent overall improvement and discharge  3/9/2023 0732 by Devoria Olszewski, RN  Outcome: Progressing  Flowsheets (Taken 3/8/2023 2000)  Care Plan - Patient's Chronic Conditions and Co-Morbidity Symptoms are Monitored and Maintained or Improved: Monitor and assess patient's chronic conditions and comorbid symptoms for stability, deterioration, or improvement     Problem: Occupational Therapy - Adult  Goal: By Discharge: Performs self-care activities at highest level of function for planned discharge setting. See evaluation for individualized goals. Description: Occupational Therapy Goals:  Initiated 3/7/2023 to be met within 7-10 days. 1.  Patient will perform lower body dressing with AE with modified independence. 2.  Patient will perform lower body bathing with AE with supervision/set-up. 3.  Patient will perform bathroom mobility with LRAD with modified independence. 4.  Patient will perform toilet transfers with modified independence. 5.  Patient will perform grooming in stance x 3-5 min and G balance. 6.  Patient will participate in upper extremity therapeutic exercise/activities with supervision/set-up for 8-10 minutes to increase strength/endurance for ADLs. 7.  Patient will utilize energy conservation techniques during functional activities with verbal cues.     PLOF:Pt reports independence     3/9/2023 1321 by Mike Alfaro, OTR/L  Outcome: Progressing     Problem: Nutrition
address the above impairments.  Patient's rehabilitation potential/Therapy Prognosis: Good.  Factors which may influence rehabilitation potential include:   [x]         None noted  []         Mental ability/status  []         Medical condition  []         Home/family situation and support systems  []         Safety awareness  []         Pain tolerance/management  []         Other:      PLAN :  Recommendations and Planned Interventions:   [x]           Bed Mobility Training             [x]    Neuromuscular Re-Education  [x]           Transfer Training                   []    Orthotic/Prosthetic Training  [x]           Gait Training                          []    Modalities  [x]           Therapeutic Exercises           []    Edema Management/Control  [x]           Therapeutic Activities            [x]    Family Training/Education  [x]           Patient Education  []           Other (comment):    Frequency/Duration: Patient will be followed by physical therapy to address goals, 1-2 times per day/3-5 days per week to address goals.    Further Equipment Recommendations for Discharge: RW         AMPAC:   Current research shows that an AM-PAC score of 18 (14 without stairs) or greater is associated with a discharge to the patient's home setting. Based on an AM-PAC score of 18/24 (or **/20 if omitting stairs) and their current functional mobility deficits, it is recommended that the patient have 2-3 sessions per week of Physical Therapy at d/c to increase the patient's independence.    This AMPAC score should be considered in conjunction with interdisciplinary team recommendations to determine the most appropriate discharge setting. Patient's social support, diagnosis, medical stability, and prior level of function should also be taken into consideration.     SUBJECTIVE:   Patient stated “I like to walk .”    OBJECTIVE DATA SUMMARY:     Past Medical History:   Diagnosis Date    Allergic rhinitis     GERD (gastroesophageal 
bed alarm active, and all needs left within reach. RN made aware. Progression toward goals:  []          Improving appropriately and progressing toward goals  [x]          Improving slowly and progressing toward goals  []          Not making progress toward goals and plan of care will be adjusted     PLAN:  Patient continues to benefit from skilled intervention to address the above impairments. Continue treatment per established plan of care. Further Equipment Recommendations for Discharge: Shower chair    Discharge Recommendations: Home with Home health OT; Home with assist PRN    AMPAC:   Current research shows that an AM-PAC score of 18 or greater is associated with a discharge to the patient's home setting. Based on an AM-PAC score of 19/24 and their current ADL deficits; it is recommended that the patient have 2-3 sessions per week of Occupational Therapy at d/c to increase the patient's independence. This AMPAC score should be considered in conjunction with interdisciplinary team recommendations to determine the most appropriate discharge setting. Patient's social support, diagnosis, medical stability, and prior level of function should also be taken into consideration. SUBJECTIVE:   Patient stated, \"I have a dog that is 3years old. \"    OBJECTIVE DATA SUMMARY:     Functional Mobility and Transfers for ADLs:   Bed Mobility:      Transfers:  Transfer Training  Transfer Training: Yes  Sit to Stand: Supervision  Stand to Sit: Supervision    Balance:  Balance  Sitting: Intact  Standing: Impaired  Standing - Static: Good  Standing - Dynamic: Fair (+)    Pain:  Pain level pre-treatment: 0/10   Pain level post-treatment: 0/10    Activity Tolerance:    Activity Tolerance: Patient limited by endurance; Patient limited by fatigue  Please refer to the flowsheet for vital signs taken during this treatment.   After treatment:   []  Patient left in no apparent distress sitting up in chair  [x]  Patient left in
reports independence     3/10/2023 1406 by JUDI Colón  Outcome: Progressing
See doc flow    Activity Tolerance:   Activity Tolerance: Patient tolerated treatment well  Please refer to the flowsheet for vital signs taken during this treatment. After treatment:   [x]         Patient left in no apparent distress sitting up in chair  []         Patient left in no apparent distress in bed  [x]         Call bell left within reach  [x]         Nursing notified  []         Caregiver present  []         Bed alarm activated  []         SCDs applied    COMMUNICATION/EDUCATION:   Patient Education  Education Given To: Patient  Education Provided: Role of Therapy;Plan of Care;Transfer Training;Home Exercise Program;Energy Conservation; Fall Prevention Strategies; Equipment  Education Method: Verbal  Barriers to Learning: None  Education Outcome: Verbalized understanding    Thank you for this referral.  Nestor Ramon, PT  Minutes: [address] [facility] AM-PAC® Basic Mobility Inpatient Short Form (6-Clicks) Version 2    How much HELP from another person does the patient currently need    (If the patient hasn't done an activity recently, how much help from another person do you think he/she would need if he/she tried?)   Total (Total A or Dep)   A Lot  (Mod to Max A)   A Little (Sup or Min A)   None (Mod I to I)   Turning from your back to your side while in a flat bed without using bedrails? [] 1 [] 2 [] 3 [x] 4   2. Moving from lying on your back to sitting on the side of a flat bed without using bedrails? [] 1 [] 2 [] 3 [x] 4   3. Moving to and from a bed to a chair (including a wheelchair)? [] 1 [] 2 [] 3 [x] 4   4. Standing up from a chair using your arms (e.g., wheelchair, or bedside chair)? [] 1 [] 2 [] 3 [x] 4   5. Walking in hospital room? [] 1 [] 2 [] 3 [x] 4   6. Climbing 3-5 steps with a railing?+   [] 1 [] 2 [x] 3 [] 4   +If stair climbing cannot be assessed, skip item #6. Sum responses from items 1-5.      At this time and based on an AM-PAC score of 23/24 (or **/20 if
treatment. After treatment:   [] Patient left in no apparent distress sitting up in chair  [x] Patient left in no apparent distress in bed  [x] Call bell left within reach  [x] Nursing notified  [] Caregiver present  [] Bed alarm activated  [] SCDs applied      COMMUNICATION/EDUCATION:   Patient Education  Education Given To: Patient  Education Provided: Role of Therapy;Plan of Care;Transfer Training;Home Exercise Program;Energy Conservation; Fall Prevention Strategies; Equipment  Education Method: Verbal  Barriers to Learning: None  Education Outcome: Verbalized understanding      Keisha Galeas PT  Minutes: [address] AM-PAC® Basic Mobility Inpatient Short Form (6-Clicks) Version 2    How much HELP from another person does the patient currently need    (If the patient hasn't done an activity recently, how much help from another person do you think he/she would need if he/she tried?)   Total (Total A or Dep)   A Lot  (Mod to Max A)   A Little (Sup or Min A)   None (Mod I to I)   Turning from your back to your side while in a flat bed without using bedrails? [] 1 [] 2 [] 3 [x] 4   2. Moving from lying on your back to sitting on the side of a flat bed without using bedrails? [] 1 [] 2 [x] 3 [] 4   3. Moving to and from a bed to a chair (including a wheelchair)? [] 1 [] 2 [x] 3 [] 4   4. Standing up from a chair using your arms (e.g., wheelchair, or bedside chair)? [] 1 [] 2 [x] 3 [] 4   5. Walking in hospital room? [] 1 [] 2 [x] 3 [] 4   6. Climbing 3-5 steps with a railing?+   [] 1 [] 2 [x] 3 [] 4   +If stair climbing cannot be assessed, skip item #6. Sum responses from items 1-5.
treatment. After treatment:   [x]  Patient left in no apparent distress sitting up in chair  []  Patient left in no apparent distress in bed  [x]  Call bell left within reach  [x]  Nursing notified  []  Caregiver present  []  Bed alarm activated    COMMUNICATION/EDUCATION:   Patient Education  Education Given To: Patient  Education Provided: Role of Therapy;Transfer Training;Plan of Care;Energy Conservation; ADL Adaptive Strategies  Education Method: Demonstration;Verbal;Teach Back  Barriers to Learning: None  Education Outcome: Verbalized understanding;Continued education needed      Thank you for this referral.  JUDI Gibson  Minutes: [address] [facility] AM-PAC® Daily Activity Inpatient Short Form (6-Clicks)    How much HELP from another person does the patient currently need    (If the patient hasn't done an activity recently, how much help from another person do you think he/she would need if he/she tried?)   Total (Total A or Dep)   A Lot  (Mod to Max A)   A Little (Sup or Min A)   None (Mod I to I)   Putting on and taking off regular lower body clothing? [] 1 [] 2 [x] 3 [] 4   2. Bathing (including washing, rinsing,      drying)? [] 1 [] 2 [x] 3 [] 4   3. Toileting, which includes using toilet, bedpan or urinal?   [] 1 [] 2 [x] 3 [] 4   4. Putting on and taking off regular upper body clothing? [] 1 [] 2 [x] 3 [] 4   5. Taking care of personal grooming such as brushing teeth? [] 1 [] 2 [x] 3 [] 4   6. Eating meals?    [] 1 [] 2 [x] 3 [] 4
[] 1 [] 2 [x] 3 [] 4   6. Climbing 3-5 steps with a railing?+   [] 1 [] 2 [x] 3 [] 4   +If stair climbing cannot be assessed, skip item #6. Sum responses from items 1-5.
AM-PAC® Daily Activity Inpatient Short Form (6-Clicks)*    How much HELP from another person does the patient currently need    (If the patient hasn't done an activity recently, how much help from another person do you think he/she would need if he/she tried?)   Total (Total A or Dep)   A Lot  (Mod to Max A)   A Little (Sup or Min A)   None (Mod I to I)   Putting on and taking off regular lower body clothing? [] 1 [x] 2 [] 3 [] 4   2. Bathing (including washing, rinsing,      drying)? [] 1 [x] 2 [] 3 [] 4   3. Toileting, which includes using toilet, bedpan or urinal?   [] 1 [] 2 [x] 3 [] 4   4. Putting on and taking off regular upper body clothing? [] 1 [] 2 [x] 3 [] 4   5. Taking care of personal grooming such as brushing teeth? [] 1 [] 2 [] 3 [x] 4   6. Eating meals?    [] 1 [] 2 [] 3 [x] 4

## 2023-03-22 VITALS
HEART RATE: 87 BPM | HEIGHT: 66 IN | WEIGHT: 225.09 LBS | OXYGEN SATURATION: 100 % | DIASTOLIC BLOOD PRESSURE: 58 MMHG | BODY MASS INDEX: 36.17 KG/M2 | SYSTOLIC BLOOD PRESSURE: 117 MMHG | TEMPERATURE: 98.7 F | RESPIRATION RATE: 22 BRPM

## 2023-03-22 LAB
ABO + RH BLD: NORMAL
BLD PROD TYP BPU: NORMAL
BLOOD BANK DISPENSE STATUS: NORMAL
BLOOD GROUP ANTIBODIES SERPL: NORMAL
BPU ID: NORMAL
CALLED TO: NORMAL
CROSSMATCH RESULT: NORMAL
SPECIMEN EXP DATE BLD: NORMAL
UNIT DIVISION: 0

## 2023-03-22 NOTE — TRANSFER CENTER NOTE
Patient was transferred [facility] via [facility] transport. Report was called into Delfina Otriz RN at [facility] on 9th floor. Patient will be in room 134.

## 2023-03-22 NOTE — HOME CARE
Notified [address]; Scheduling;  Team.  Patient has transported to [facility] via transport.

## 2023-03-22 NOTE — PROGRESS NOTES
conducted an initial consultation and Spiritual Assessment for Carol Lugo, who is a 75 y. o.,male. Patient's Primary Language is: [place]. According to the patient's EMR Mosque Affiliation is: [place]. The reason the Patient came to the hospital is:   Patient Active Problem List    Diagnosis Date Noted    Alcohol-induced acute pancreatitis 03/03/2023    Ileus ([facility] 03/03/2023    SIRS (systemic inflammatory response syndrome) ([facility] 03/03/2023    Lactic acidosis 03/03/2023    MISTY (acute kidney injury) ([facility] 03/03/2023    Hyperkalemia 03/03/2023    Alcohol dependence with unspecified alcohol-induced disorder ([facility] 03/03/2023    Acute pancreatitis without infection or necrosis 03/02/2023    Prediabetes     GERD (gastroesophageal reflux disease)     Hypertension     Severe obstructive sleep apnea 02/23/2018    Restless legs syndrome (RLS) 02/23/2018        The  provided the following Interventions:  Initiated a relationship of care and support. Explored issues of shawn, belief, spirituality and Nondenominational/ritual needs while hospitalized. Listened empathically. Provided information about Spiritual Care Services. Offered prayer and assurance of continued prayers on patient's behalf. Chart reviewed. The following outcomes where achieved:  Patient not interested in completing an Advance Medical Directive at this time.  confirmed Patient's Mosque Affiliation. Patient expressed gratitude for 's visit. Assessment:  Patient does not have any Nondenominational/cultural needs that will affect patient's preferences in health care. There are no spiritual or Nondenominational issues which require intervention at this time. Plan:  Chaplains will continue to follow and will provide pastoral care on an as needed/requested basis.  recommends bedside caregivers page  on duty if patient shows signs of acute spiritual or emotional distress.     Karen Davis
0700: Bedside and Verbal shift change report given to SONIA Mendez (oncoming nurse) by Usha Hernandez RN (offgoing nurse). Report included the following information Nurse Handoff Report, MAR, and Cardiac Rhythm NSR .     2015: Paged Dr Corrine June about patient's parameters for his Coreg medication as his diastolic BP is less than 60.    1350: Type and screen lab collected off patient's PICC. Sent to lab. Patient signed blood consent form.
0730:  Shift change report received from Yokasta Fox. Sleeping in bed.  0800:  Assisted OOB to recliner per pt request; tolerated well. Complete assessment performed. No acute changes noted from report received. Denies pain or discomfort. Legs elevated. Breakfast served. Call bell, phone and urinals at side. Monitor. 1000:  Physical Therapy worked with patient, tolerated well. Returned to [facility]. 1230:  Accucheck =392. Insulin coverage given as ordered. RN paged Dr Chelsey Aponte to inform per orders. Lunch served. 1600:  Ambulated in halls using rolling walker, RN accompany as standby assist. Tolerated well. Returned to [facility]. Denies pain. 1620:  RN has paged Dr Chelsey Aponte mistakenly today, Dr Petros Chance is attending MD today. Informed Dr Petros Chance of elevated Glucose/ Accuchecks. New orders received to resume medications. See MAR for details. 1930: Wife remains at side. Pt remains in recliner, legs elevated. Shift change report given to SONIA Casas with bedside rounds.
0800:  Shift change report received from [facility]. AAOx4 in bed. Complete assessment performed with no acute changes noted. Assisted with reposition for comfort. Call bell and phones at side. Monitor. 1030:  RN called to room by patient with new c/o wheezing. Remains in bed, AAOx4. Room air pulse ox sats 95%. New finding scattered wheeze right upper lobes. Denies SOB. Dr Hermelindo Adrian paged, informed. New order for Duoneb treatments received, see MAR for administration. RN requested Dr Hermelindo Adrian review medication orders as pt NPO and several orders are oral route. MD has held several meds, see MAR.  1100:  GI provider on rounds. RN inquired if pt able to have any meds crushed, administered via NGT at present time; GI Provider confirms NO PO meds at this time. RN will inform Dr Hermelindo Adrian as he left Coreg to administer oral route. NGT remains LIS. No meds given oral/ngt route. 1115:  Assisted OOB to cardiac recliner chair, legs elevated. Tolerated well. Wheezing gone after Duoneb treatment. Feels \"better\" OOB in chair. Monitor. 1230:  Dr Hermelindo Adrian on rounds, see new held orders for meds. 1430: Alvares discontinued per MD orders without difficulty. Assisted with standing and reposition in recliner. Urinal at side. Monitor. 1830:  Assisted back to bed. Wife requests attending MD call their son every day; RN paged and informed Dr Hermelindo Adrian of this request.   1930:  Shift change report given bedside to SONIA Shea with bedside rounds and info of urination needed by 2030 tonight.  Wife at side, RN updated her to message request to have MD call their son daily was indeed passed on via phone to MD.
1433 [facility] Pharmacokinetic Monitoring Service - Vancomycin     Dallin Hopson is a 75 y.o. male starting on vancomycin therapy for Sepsis of Unknown Etiology x 7 days. Pharmacy consulted for monitoring and adjustment. Target Concentration: Goal AUC/SHELLY 400-600 mg*hr/L    Additional Antimicrobials: Meropenem    Pertinent Laboratory Values:   Temp: 97.7 °F (36.5 °C), Weight: 225 lb 1.4 oz (102.1 kg)  Recent Labs     03/15/23  0423 03/16/23  0353   CREATININE 1.01 1.22   BUN 9 10   WBC  --  7.9     Estimated Creatinine Clearance: 65 mL/min (based on SCr of 1.22 mg/dL).     Plan:  Dosing recommendations based on Bayesian software  Start vancomycin 2000 mg x 1 followed by Vancomycin 750 mg q12h (pt previously on Vancomycin 3/10-3/12)  Anticipated AUC of 439 and trough concentration of 15.1 at steady state  Renal labs as indicated   Vancomycin concentration ordered for 3/18 @ 0400  Pharmacy will continue to monitor patient and adjust therapy as indicated    Thank you for the consult,  Niels Cortez, [address] [facility]  3/16/2023
[phone]    Gastroenterology follow up-Progress note    Impression:  Acute pancreatitis - clinically severe with significant retroperitoneal inflammation and associated ileus   Periumbilical pain, n/v - improved  MISTY improved   Marked hyperglycemia , this needs to be corrected as it is delaying recovery of gut function: BG numbers seem to be improving today  GERD   HTN  Severe MEAGAN  Pre-DM  Hx of colon polyps - last colo 10/2017 w/ SSA polyps w/ 5yr recall. Pt reports more recent colo 2mo ago w/ Dr. Devante Hayes w/ polyps noted. Multifactorial anemia    Ascites? Plan:  1. Continue tight glucose control  2. Advance diet to full liquids, if tolerated, continue to advance as long as N/V does not start  3. Up in chair today        Chief Complaint: ABD pain, acute pancreatitis      Subjective:  Pt seen sitting up in bed dozing, falls asleep during conversation. Dyspnea/tachypnea noted while sleeping and during conversation, O2 via NC. States his ABD is still distended and causing discomfort, but that the symptoms are overall improving. Tolerating clear liquids. Had one tiny episode of emesis last night of stomach acid, pt stated that he thinks it's because he tried laying completely flat. Blood sugars seem to be improving today. ROS: Denies any fevers, chills, rash.      Eyes: conjunctiva normal, EOM normal   Neck: ROM normal, supple and trachea normal   Cardiovascular: heart normal, intact distal pulses, normal rate and regular rhythm   Pulmonary/Chest Wall: breath sounds normal and effort normal   Abdominal: appearance normal, bowel sounds normal and soft, non-acute, non-tender     Patient Active Problem List   Diagnosis    Prediabetes    GERD (gastroesophageal reflux disease)    Severe obstructive sleep apnea    Restless legs syndrome (RLS)    Hypertension    Acute pancreatitis without infection or necrosis    Alcohol-induced acute pancreatitis    Ileus (HCC)    SIRS (systemic inflammatory response syndrome)
1500  Walked with patient around the unit using a walker. Patient tolerated ambulating well.      1600  Assisted patient with personal hygiene and washed hair. Patient is back in chair with call bell in place.
1900. Attempt by two different nurses to establish NGT without success due to resistance.  Attending made aware
1930 Bedside and Verbal shift change  Received from SONIA Oconnell (outgoing nurse), to LEO Miller (incoming)  Pt. Is AOX 4. IV patent and infusing well, Pt. denies pain at this time. Report included the following information SBAR, Procedure Summary, Intake/Output, MAR, Recent Results, Med Rec Status, and Cardiac Rhythm @ SR. Will Resume care and monitor Pt. Condition. 2030 Pt. Head to Toe Assessment Done and documented. Pt. Educated on call bell when in need of help and assistance. Pt. verbalized understanding. 2100 Pt resting on reclining chair. 2230  Pt. Pt eyes closed, easily awaken. 2345  Pt oOB to bathroom, voided rene clear yellow urine. 0100 Pt eyes closed, easily awaken. 0300  Pt. Denies discomfort. 0440 OOB to bathroom, assisted back to reclining chair. 0600  Verbal and bedside Shift changed report given to Chris Parra RN (oncoming RN) on Pt. Condition. Report consisted of patients Situation, History, Activities, intake/output,  Background, Assessment and Recommendations(SBAR). Information from the following report(s) Kardex, order Summary, Lab results and MAR was reviewed with the receiving nurse. Opportunity for questions and clarification was provided.
1930 Received critical lab results from [address] [place] [address] as follows.     Lattarsha Acid - 19      Relayed results to  at 1933
1933 Bedside and Verbal shift change  Received from SONIA Oconnell (outgoing nurse), to LEO Vera (incoming)  Pt. Is AOX 4. IV patent and infusing well, Pt. denies pain at this time. Report included the following information SBAR, Procedure Summary, Intake/Output, MAR, Recent Results, Med Rec Status, and Cardiac Rhythm @ SR. Will Resume care and monitor Pt. Condition. 2000 Pt. Head to Toe Assessment Done and documented. Pt. Educated on call bell when in need of help and assistance. Pt. verbalized understanding. 2105  OOB to bathroom x1 assist.    2230  Pt. OOB ambulating sa hallway back to room. Maria A [address] on.      0000  Pt. Resting with eyes closed, easily awaken. Voided [place] clear urine. 0200 Pt. Resting in bed, not in distress. 0330  Pt. Ambulalated to hallway and back. [facility] on.    0500 Pt assisted back to reclining chair. 0630 Pt made no complaints. Verbal and bedside Shift changed report given to Sam Neves RN (oncoming RN) on Pt. Condition. Report consisted of patients Situation, History, Activities, intake/output,  Background, Assessment and Recommendations(SBAR). Information from the following report(s) Kardex, order Summary, Lab results and MAR was reviewed with the receiving nurse. Opportunity for questions and clarification was provided.
193: Bedside shift change report given to SONIA Shea (oncoming nurse) by Chidi Rogers RN (offgoing nurse). Report included the following information SBAR, Kardex, Intake/Output, MAR, Recent Results, and Cardiac Rhythm NSR . Wound Prevention Checklist    Patient: Nain Fleming (6 y.o. male)  Date: 3/21/2023  Diagnosis: Acute pancreatitis, unspecified complication status, unspecified pancreatitis type [K85.90]  Pancreatitis, reflux [K85.80] Acute pancreatitis without infection or necrosis    Precautions:         []  Heel prevention boots placed on patient    []  Patient turned q2h during shift    []  Lift team ordered    []  Patient on [place] bed/Specialty bed    [x]  Each Wound is documented during shift (Stage, Color, drainage, odor, measurements, and dressings)    [x]  Dual skin check done with SONIA Reynoso RN     : Shift assessment done. V/S obtained. Pt resting quietly in chair with wife at the bedside. A/Ox4. No c/o pain or SOB at this time. Pt on RA; CPAP at bedside to be used HS. TPN running; rate verified. No other needs expressed. Call light within reach    : New bag TPN given; rate changed to 83 mL/hr per order. Scheduled meds also given. No complaints. : Scheduled med given. No complaints. 0000: No changes from previous assessment. Pt sleeping comfortably in his recliner. No distress noted at this time. TPN running. Call light within reach. 0022: B; Insulin coverage given per protocol. 0200: CHG bath done. Gown, bed sheet, bed pad, linen changed. No complaints. 0251: Scheduled med given. No complaints. 0400: No changes from previous assessment. Pt sleeping comfortably in his recliner. No distress noted at this time. TPN running. Call light within reach. 0606: Scheduled med given. No complaints. CHG bath done. 5783: B; Insulin coverage given per protocol. 0720:  Bedside shift change report given to [facility]
193: Bedside shift change report given to SONIA Shea (oncoming nurse) by Michelle Bowen (offgoing nurse). Report included the following information SBAR, Kardex, Intake/Output, MAR, Recent Results, and Cardiac Rhythm NSR . Wound Prevention Checklist    Patient: Sophie Batista (20 y.o. male)  Date: 3/14/2023  Diagnosis: Acute pancreatitis, unspecified complication status, unspecified pancreatitis type [K85.90]  Pancreatitis, reflux [K85.80] Acute pancreatitis without infection or necrosis    Precautions:         []  Heel prevention boots placed on patient    []  Patient turned q2h during shift    []  Lift team ordered    []  Patient on [place] bed/Specialty bed    []  Each Wound is documented during shift (Stage, Color, drainage, odor, measurements, and dressings)    [x]  Dual skin check done with [address] SONIA Da Silva     : Shift assessment done. V/S obtained. Pt resting quietly in bed with wife at the bedside. A/Ox4. No c/o SOB at this time, but c/o back pain. PRN pain med given per pt request. IVF running. No other needs expressed. Call light within reach    2125: Scheduled meds given. No complaints. New bag of IVF given. 2200: Pt sleeping comfortably. No distress noted at this time. 0000: No changes from previous assessment. Pt sleeping comfortably. No c/o pain at this time but c/o wheezing, Duoneb given since pt missed a dose. Respiratory therapist made aware. BP:168/73; PRN hydralazine given per protocol. B; Insulin coverage given per protocol. No other needs expressed. Call light within reach. 0200: Pt sleeping comfortably. No distress noted at this time. 0400: No changes from previous assessment. Pt sleeping comfortably. No distress noted at this time. Call light within reach. 6533: Pt c/o nausea; PRN nausea med given. Scheduled meds also given. New bag IVF given. 0600: Inserted new IV on pt.     2955: B; Insulin coverage given per protocol.      0715:
[phone]    Gastroenterology follow up-Progress note    Impression:  66yo M with PMHx of HTN, T2DM, and MEAGAN who is admitted with acute pancreatitis. Initially thought to be uncomplicated, he has now developed significant necrosis and gas within the pancreatic parenchyma, which is certainly concerning. He has some degree of ileus, though seems to be improving. Plan:  1. There are no data to guide fluid therapy after first 24hrs. Current maintenance with LR while he is NPO is appropriate, and urine output should be monitored as close as possible. 2. Pain control and antiemetics as necessary. 3. Would consider sampling pancreatic fluid with IR colleagues when possible. 4. Will defer choice of antibiotics to our ID colleagues, but would consider carbapenems as part of the regimen while waiting for sampling. 5. NPO while he has ileus is appropriate. Would encourage mild activity and NGT removal as soon as LIWS shows no residuals. Start diet as soon as this occurs, as Nutrition is paramount and one of the few things to change outcome in severe pancreatitis. 6. Patient seems to be clinically improving, which is encouraging. If there is a deterioration, necrosectomy should be discussed and considered. 7. Patient is a non-smoker. Should also avoid any further alcohol intake as well. Subjective:    Abdominal discomfort and bloating have improved with low-intermitted wall suctioning. Had a bowel movement last night. Had both son and wife visit him. ROS: Denies any fevers, chills, rash.      Eyes: conjunctiva normal, EOM normal   Neck: ROM normal, supple and trachea normal   Cardiovascular: heart normal, intact distal pulses, normal rate and regular rhythm   Pulmonary/Chest Wall: Diffuse rhonchi, mild wheeze   Abdominal: appearance normal, bowel sounds decreased but present, mild tenderness to palpation in epigastric region, though no guarding or rebound tenderness     Patient Active Problem List
[phone]    Gastroenterology follow up-Progress note    Impression:  1. Acute pancreatitis - clinically severe with significant retroperitoneal inflammation and associated ileus   2. Periumbilical pain, n/v - improved  3. MISTY improved   4. Marked hyperglycemia , this needs to be corrected as it is delaying recovery of gut function  5. GERD   6. HTN  7. Severe MEAGAN  8. Pre-DM  9. Hx of colon polyps - last colo 10/2017 w/ SSA polyps w/ 5yr recall. Pt reports more recent colo 2mo ago w/ Dr. Maribel Boothe w/ polyps noted. 10. Multifactorial anemia        Plan:  1. Imperative to have better glucose control  2. IV fluid hydration  3. Up in chair today     Chief Complaint: abd pain       Subjective: The pt has had persistent distention and abd discomfort . No n/v or bleeding noted . Tachypnea noted for days but stable. blood sugar quite high. ROS: Denies any fevers, chills, rash.      Eyes: conjunctiva normal, EOM normal   Neck: ROM normal, supple and trachea normal   Cardiovascular: heart normal, intact distal pulses, normal rate and regular rhythm   Pulmonary/Chest Wall: breath sounds normal and effort normal   Abdominal: appearance normal, bowel sounds normal and soft, non-acute, non-tender     Patient Active Problem List   Diagnosis    Prediabetes    GERD (gastroesophageal reflux disease)    Severe obstructive sleep apnea    Restless legs syndrome (RLS)    Hypertension    Acute pancreatitis without infection or necrosis    Alcohol-induced acute pancreatitis    Ileus (HCC)    SIRS (systemic inflammatory response syndrome) (HCC)    Lactic acidosis    MISTY (acute kidney injury) ([facility]    Hyperkalemia    Alcohol dependence with unspecified alcohol-induced disorder (HCC)         BP (!) 166/88   Pulse 91   Temp 98.8 °F (37.1 °C) (Oral)   Resp (!) 32   Ht 5' 6\" (1.676 m)   Wt 157 lb (71.2 kg)   SpO2 95%   BMI 25.34 kg/m²         Intake/Output Summary (Last 24 hours) at 3/5/2023 8448  Last data filed at 3/5/2023
Bedside and Verbal shift change report givEN by Aaliyah Gray RN (offgoing nurse). Report included the following information Nurse Handoff Report, Intake/Output, Recent Results, and Cardiac Rhythm NSR .      2001: sleeping on recliner, on room air, with regular, nonlabored breathing; awakens easily with verbal commands; v/s monitored, shift assessment done; NSR on tele; call bell within reach    2100: Assisted to bathroom; had incontinence episode on recliner, with moderate amount of brown loose stools; shaji care done; gown and pads changed    2117: Tramadol  1 tab po given as requested for abdominal discomfort- see flowsheet    2200: up on recliner sleeping; no visual indicators for pain noted at this time    2314: reassessment done; no changes noted    0030: Assisted with bathroom use; had a medium amount of stools, including incontinence; shaji care done; gown and pads changed; kept comfortable back on recliner; hooked on CPAP    0154: sleeping on recliner with CPAP on    0334: reassessment done; no changes noted    0533: due heparin sc given    0633: G 20 IV heplcock inserted on right forearm    Bedside and Verbal shift change report given to Ish Sheth (oncoming nurse) by Sanaz Aguirre RN (offgoing nurse). Report included the following information Nurse Handoff Report, Intake/Output, Recent Results, and Cardiac Rhythm NSR  .       Wound Prevention Checklist    Patient: Lili Burt (34 y.o. male)  Date: 3/17/2023  Diagnosis: Acute pancreatitis, unspecified complication status, unspecified pancreatitis type [K85.90]  Pancreatitis, reflux [K85.80] Acute pancreatitis without infection or necrosis    Precautions:         []  Heel prevention boots placed on patient    [x]  Patient turned q2h during shift    []  Lift team ordered    []  Patient on [place] bed/Specialty bed    []  Each Wound is documented during shift (Stage, Color, drainage, odor, measurements, and dressings)    [x]  Dual skin check done
Bedside and Verbal shift change report given to [facility] (oncoming nurse) by Tc Conti RN (offgoing nurse). Report included the following information Nurse Handoff Report, Intake/Output, MAR, Recent Results, and Cardiac Rhythm ST . Quietly resting in bed. HOB elevated. No SOB on oxygen at 3 LPM. NGT present to left nare . Clamped. Pending X ray result. 2059: X ray resulted. Paged Hospitalist on call. 2106: Dr. Mckeon President called back. Made aware that the Portable XR resulted. Order obtained to placed NGT to suction. Placed on LCWS at 20 mmHg. 2203: Due meds given. 2336: No change from previous assessment. 0114: Patient pulled out his NGT. \" I cannot take it anymore.' Refused reinsertion. Due med given. Medicated for pain per patient request.     0130: Paged & talked to Dr. Mckeon President re above situations. 0345: No change from previous assessment. 9573: Slept good thru night. Needs attended. Bathed. Medicated for pain per patient request.     0730: Bedside and Verbal shift change report given to [address] [facility] (oncoming nurse) by Promise Harrington RN (offgoing nurse).  Report included the following information Nurse Handoff Report, Intake/Output, MAR, Recent Results, and Cardiac Rhythm SR .
Bedside and Verbal shift change report given to [address] [facility] (oncoming nurse) by Cynthia Arenas (offgoing nurse). Report included the following information Nurse Handoff Report, Intake/Output, MAR, Recent Results, and Cardiac Rhythm SR . OOB in Recliner. Comfortably resting. No SOB on RA. Denies any pain or discomfort at this time. On IVF of LR at 125 ml/hr. Infusing to Left wrist. Call light within reach. Wife at bedside. Wound Prevention Checklist    Patient: Michelle Wayne (27 y.o. male)  Date: 3/15/2023  Diagnosis: Acute pancreatitis, unspecified complication status, unspecified pancreatitis type [K85.90]  Pancreatitis, reflux [K85.80] Acute pancreatitis without infection or necrosis    Precautions:         []  Heel prevention boots placed on patient    []  Patient turned q2h during shift    []  Lift team ordered    []  Patient on [place] bed/Specialty bed    []  Each Wound is documented during shift (Stage, Color, drainage, odor, measurements, and dressings)    [x]  Dual skin check done with Bj Shine RN        2134: Due meds given. 2147: . Coverage provided per protocol. Encouraged patient to practice incentive spirometer 5-10 X every hour while awake or watching TV. Can reached 1500  each time. 0033: No change from previous assessment. 0230: Comfortably sleeping in the recliner. 0447: No change from previous assessment. 0530: Slept good thru night. Needs attended. Bathed. 8626: Due meds given. 0740: Bedside and Verbal shift change report given to [address]  (oncoming nurse) by Oscar Alvarado RN (offgoing nurse).  Report included the following information Nurse Handoff Report, Intake/Output, MAR, Recent Results, and Cardiac Rhythm SR .
Bedside and Verbal shift change report given to [address] [facility] (oncoming nurse) by Carla Roy RN (offgoing nurse). Report included the following information Nurse Handoff Report, Intake/Output, MAR, Recent Results, and Cardiac Rhythm SR .OOB in recliner. No SOB on RA. Denies any pain or discomfort at this time. Call light within reach. Wife at bedside. 1930: Assisted patient to bathroom. Incontinence care provided. 2102: Due meds given. . No coverage provided per protocol. Encouraged patient to practice & use incentive spirometer every hour while awake or watching TV. \" I did it 3X already. \"     2300: No change from previous assessment. Assisted patient to bathroom & back in bed.     2324: Intake > output. Concerned about bladder/abdomen distended. Bladder scan done. Post void residual 581 ml.      2330: Paged & talked to Dr. Madhavi Jin. Informed of the above situation. Order to d/c IVF & do straight cath X 1 time. 2355: Straight cath done. Output is only 50 ml. Procedure tolerated well. 0320: No change from previous assessment. 0374: Medicated for nausea per patient request.     6697: Slept on & off thru night. Needs attended. Due meds given. Refused AM care for now. 5652: Bedside and Verbal shift change report given to [address] [facility] (oncoming nurse) by Nisa Sharp RN (offgoing nurse).  Report included the following information Nurse Handoff Report, Intake/Output, MAR, Recent Results, and Cardiac Rhythm SR .
Bedside and Verbal shift change report given to Ambar Potter RN (oncoming nurse) by Vinnie Bender RN (offgoing nurse). Report included the following information Nurse Handoff Report, Index, and Cardiac Rhythm SR .     8556 Patient resting with CPAP on. Bed in lowest position, call bell within reach, and bed alarm on. Encouraged patient to sit up in chair during the day. 0830: Patient up to chair. 1128: Tramadol given for pain 8/10 and acetaminophen given for oral temp 100.6.     1215: Patient reports relief after receiving tramadol. In bed resting with CPAP on.       1635: Patient encouraged to use incentive spirometer throughout the day. Patient has used the IS three times today. He was educated on the importance of using the IS to help prevent pneumonia. Patient verbalized understanding.
Bedside and Verbal shift change report given to Augusto SCOTT (oncoming nurse) by Michael Velazquez RN (offgoing nurse). Report included the following information Nurse Handoff Report, Intake/Output, MAR, Recent Results, and Cardiac Rhythm SR. Quietly resting in bed. HOB elevated. No SOB on RA. Denies any pain or discomfort at this time. Call light within reach. Wife at beside. 2055: Wife concerned about patient's been having elevated temperature for the last 2 nights & for discharge tomorrow. Paged & talked to Dr. Clinton Urena. Will see & evaluate the patient. 2115: Dr Clinton Urena at bedside. 2143: Due meds given. Medicated for fever. 2230: BC X 2 & PCXRay done    0000: No change from previous assessment. Temp down to 98.5    0018: Due Zosyn loading dose started. 0125: Due Vanco loading dose started. 0400: No change from previous assessment. Instructions given on how to use incentive spirometer. Can reached 1500. Encouraged to practice 5-10X every hour while awake or watching TV. Instructed also for sputum specimen collection. Container at bedside. 2845: Medicated for elevated Temperature     0610: Slept on & off thru night. Needs attended. Due meds given. Sputum specimen collected & Nasal swab for MRSA done    0740: Bedside and Verbal shift change report given to [address] (oncoming nurse) by Yancy Katz RN (offgoing nurse).  Report included the following information Nurse Handoff Report, Intake/Output, MAR, Recent Results, and Cardiac Rhythm SR .
Bedside and Verbal shift change report given to [employer] RN (oncoming nurse) by Karolyn Garrison RN (offgoing nurse). Report included the following information Nurse Handoff Report, MAR, Recent Results, and Cardiac Rhythm NSR .
Bedside and Verbal shift change report given to [employer] RN (oncoming nurse) by Yoana Rubio RN (offgoing nurse). Report included the following information Nurse Handoff Report, Intake/Output, MAR, Recent Results, and Cardiac Rhythm NSR .
Bedside and Verbal shift change report given to [facility]  RN(oncoming nurse) by Jackie Meléndez RN (offgoing nurse). Report included the following information Nurse Handoff Report, Intake/Output, MAR, Recent Results, and Cardiac Rhythm SR . Quietly resting in bed. HOB elevated. No SOB on oxygen at 3 LPM. On IVF of NS at 100 ml/hr. Infusing well to Right hand. Call light within reach. Bed exit alarm on.     1951: Medicated for pain per patient request.     2107: Due meds given. . No coverage provided per protocol. 2111: Medicated for nausea per patient request. Advised patient to keep HOB elevated. 2330: No change from previous assessment. Patient expressed relief from Zofran. 2342: Medicated for pain per patient request.     0211: Medicated for pain per patient request.     0430: Patient vomited greenish \" bile like \" secretions. Assisted patient to bathroom & back in bed. Steady gait. Per patient moved bowels, \" small soft brown stool \". Bathed/AM care provided. 3415: Due med given. Medicated for pain per patient request.     8928: Slept on & off thru night. Needs attended. Bedside and Verbal shift change report given to Alvaro Salas RN (oncoming nurse) by [facility] RN (offgoing nurse).  Report included the following information Nurse Handoff Report, Intake/Output, MAR, Recent Results, and Cardiac Rhythm SR .
Bedside and Verbal shift change report given to Daryle President, RN (oncoming nurse) by Hortencia Hart RN (offgoing nurse). Report included the following information Nurse Handoff Report, ED SBAR, Intake/Output, MAR, Recent Results, Med Rec Status, and Cardiac Rhythm NSR .      2000: Pt  awake, denies pain or sob, on room air, VSS, shift assessment completed, bed locked and low position, call bell within reach    2155: Assisted pt to bathroom, had a large, watery BM,  back to bed comfortably    2208: Due medication given, BG 99- No coverage per protocol, Lantus 10 untis sc given as schedule    0001: VSS, Pt ambulated to bathroom and back to bed    0144: Pt sleeping, CPAP on, no distress noted    0420: VSS, Pt voiced no complaints, reassessment completed, nasal swab done and sent to Lab    0616: Due Medication given, PRN Tylenol 650 mg PO given for fever    0631: Dr. Carey Hernandes made aware of Pt K 2.9, orders received    0650: Potassium 50 meq tab given    0705: New IV gauge # 22 inserted    Bedside and Verbal shift change report given to Connell RN (oncoming nurse) by Daryle President, RN (offgoing nurse). Report included the following information Nurse Handoff Report, ED SBAR, Intake/Output, MAR, Recent Results, Med Rec Status, and Cardiac Rhythm NSR, Sinus Tach .
Bedside and Verbal shift change report given to SAHARA Poole RN (oncoming nurse) by Kennedy Tate RN (offgoing nurse). Report included the following information Nurse Handoff Report, Intake/Output, Recent Results, and Cardiac Rhythm NSR . Assumed care of patient. He is resting in bed with home cpap machine in place, wife at bedside. Shift assessment completed. Patient assessed for safety precautions. 2138 scheduled medications given at this time. Cpap machine removed at this time. Patient tolerated well. Patient c/o abd pain prn tramadol given. 2230 patient assisted with putting cpap machine on.     0000 reassessment of patient. No changes. He is resting in bed, w/ cpap machine in place. NSR on monitor. 0200 assisted patient with ambulating to bathroom. He has a steady gait. Patient has medium bowel movement. Assisted back into bed.    0400 patient reassessment. No changes    0600 scheduled medication given at this time. Patient provided with personal hygiene care. Bedside and Verbal shift change report given to SONIA Alvarez (oncoming nurse) by [employer], RN (offgoing nurse). Report included the following information Nurse Handoff Report, Intake/Output, Recent Results, and Cardiac Rhythm NSR .
Bedside and Verbal shift change report given to SONIA Chan (oncoming nurse) by Chari Doran RN (offgoing nurse). Report included the following information Nurse Handoff Report, Index, and Cardiac Rhythm SR . Skin assessment completed by this writer. Bruising noted around left eye, present at admission. Patient awake and alert. Call bell within reach, bed alarm on and bed in lowest position. 9997: Patient reports drinking 1 - 2 shots of gin daily. Slight tremors noted in upper extremities. 8429: CIWA assessment completed - score is 4. Patient is resting, denies pain. 7108: Patient reports abdominal pain 4/10.     0956: Morphine 2 mg via IV given. 1004: Off unit for ultrasound. 1108: Patient returned to unit. 1231: Dr. Margaret Vick on unit. Discussed patient with MD. Notified Dr. Margaret Vick that patient's BG is 357. Verbal order to start clear liquid diet and advance as tolerated per Dr. Margaret Vick. NG tube will not be inserted at this time. Per MD assess if patient is able to tolerate clear liquids. 1241: Diet ginger ale provided. Patient tolerated well.    1320: Patient's son approached this writer and requested that he be added as to patient's contact list. I spoke with the patient and he gave permission to add both sons to his contact list. Patient denies nausea and vomiting. 1533: Patient resting. Wife and son at bedside. Tolerating small sips of ginger ale. 1613: Patient tachypnea. Dr. Margaret Vick informed. Per Dr. Randolph Books has been asked to assess the patient. Patient also states that he uses a CPAP at home. Patient's son will bring the CPAP to the hospital. Dr. Margaret Vick informed. 1805: Received call from  to attempt NG tube placement. Staff was unable to place yesterday. 1823: NG tube placed by Ronda Tan RN. Verbal order obtained for chest x-ray to verify placement.
Bedside and Verbal shift [facility] by Diane Tiardo RN (offgoing nurse). Report included the following information Nurse Handoff Report, Intake/Output, and Event Log.
Bedside and Verbal shift change report given to Trinidad Villaseñor RN (oncoming nurse) by Jay Salse RN (offgoing nurse). Report included the following information Nurse Handoff Report, Intake/Output, MAR, and Cardiac Rhythm SR/ST .       7665: Patient awake and alert. Bed in lowest position, call bell within reach, and bed alarm on. Patient rates pain 3/10 and requesting pain medication. /88, HR 87, RR 35. Patient out of bed to chair today with nurse assistance.
[facility] Hospitalist Group  Progress Note    Patient: Keo Keller Age: 75 y.o. : 1955 MR#: [id number] SSN: [id number]  Date/Time: 3/3/2023    Subjective:     Pt c/o abdominal pain, looked uncomfortable w/ rapid respiratory rate, states his abdomen is distended and that he's passing gas. Assessment/Plan:           -Acute pancreatitis likely due to EtOH use, triglycerides wnl  Pt w/ tachypnea and tachycardia today, appears uncomfortable. Evidence of hemoconcentration given increasing BUN, crit. All concerning and indicate need for escalation of care. Pt presently telemetry unit. -MISTY in setting of above    -Hyperkalemia in setting of above and after potassium replacement    -SIRS: suspected due to acute pancreatitis, given tachycardia, tachypnea    -Stomach distension ? Due to underlying pancreatitis, +flatus presently, no n/v    PLAN:    -Upgrade to stepdown  -Use normal saline instead of LR until K normalizes  -Hydrate and follow renal function, K  -Avoid nephrotoxic agents  -GI consulted  -GI ordered KUB, will cont to monitor pt to see if he needs NGT given some type of ileus vs less likely un obstructive process causing stomach distension.         Additional Notes:      Case discussed with:  [x]Patient  []Family  []Nursing  []Case Management  DVT Prophylaxis:  [x]Lovenox  []Hep SQ  []SCDs  []Coumadin   []On Heparin gtt    Objective:   VS: /76   Pulse (!) 108   Temp 98.7 °F (37.1 °C) (Oral)   Resp 30   Ht 5' 6\" (1.676 m)   Wt 157 lb (71.2 kg)   SpO2 95%   BMI 25.34 kg/m²    Tmax/24hrs: Temp (24hrs), Av.7 °F (37.1 °C), Min:98.4 °F (36.9 °C), Max:98.9 °F (37.2 °C)    Input/Output:   Intake/Output Summary (Last 24 hours) at 3/3/2023 1405  Last data filed at 3/3/2023 0955  Gross per 24 hour   Intake 100 ml   Output --   Net 100 ml     Genera: alert, awake, in mild distress  HEENT: NCAT, sclerae anicteric,  mmm, neck supple  COR: rrr, no murmurs,
Comprehensive Nutrition Assessment    Type and Reason for Visit:  Initial, RD Nutrition Re-Screen/LOS    Nutrition Recommendations/Plan:   Continue current diet as tolerated by pt. While reassess ONS needs at follow-up, pending dietary tolerance. Plan to order scale wt to assess possibility of wt loss. Plan to order MVI and folic acid r.t h/o alcohol use. Continue thiamine. Malnutrition Assessment:  Malnutrition Status:  Insufficient data (need scale wt to assess potentially significant wt loss PTA) (03/08/23 1439)      Nutrition History and Allergies:   PMHx of GERD, HTN, prediabetes, alcohol use. Pt presented with abdominal pain, N/V which began of morning of admission. Reported becoming lightheaded and falling after one instance of vomiting- CT without evidence of bleed, trauma. Nutrition Assessment:    Pt presented with abdominal pain, N/V which began on morning of admission. Wt hx review:  c wt- 157.2 lb (bed scale); 11/07/22- 193 lb (-18.5%); 5/02/22- 197.6 lb (-20.4%); 4/28/21- 190 lb (-17.4%). Pt reported usual wt was 190 lb, though unable to tell time frame. Reported some wt loss intentional, but lately decreased appetite/pain while eating has contributed as well. Pt currently tolerating full liquids without increases in abdominal pain/nausea, though still noting loose bowel movements. Will continue to follow to assess tolerance as diet is advanced- diet advanced to current @ 13:00, pt has not had meal yet. Obtained bedscale wt of 213 lb while in room- likely inaccurate; plan to order scale wt. Nutrition Related Findings:    + BM 3/08. Output: 400 mL (urine- catheter). Labs: K (3.4) L; POC glucose  x previous 24-hrs. Wound Type: None       Current Nutrition Intake & Therapies:    Average Meal Intake:  (Previous diet clear liquids- tolerating intake >50%)  Average Supplements Intake: None Ordered  ADULT DIET; Regular; 3 carb choices (45 gm/meal);  Low Fat/Low Chol/High Fiber/2 gm Na;
Comprehensive Nutrition Assessment    Type and Reason for Visit:  Reassess, Consult, NPO/Clear Liquid    Nutrition Recommendations/Plan:    Provide parenteral nutrition using standard premixed product until patient is able to tolerate adequate intake of oral diet. and adequate enteral nutrition. Modify PN to start at 20:00 tonight. See below for order contents/details. Lipids provided Monday, Wednesday, and Friday only  2. Plan to add insulin in next PPN bag  3. Continue oral thiamine and multivitamin supplementation. 4.   Monitor and replace electrolytes - K replacement addressed by MD  5. Check ionized Ca level tomorrow  6. Continue to monitor readiness for nutrition support, weight, labs, and plan of care during admission. Parenteral Nutrition Order:       Current bag:     Next bag:        Malnutrition Assessment:  Malnutrition Status:  Mild malnutrition (03/16/23 1325)    Context:  Acute Illness     Findings of the 6 clinical characteristics of malnutrition:  Energy Intake:  50% or less of estimated energy requirements for 5 or more days  Weight Loss:  Unable to assess     Body Fat Loss:  Unable to assess     Muscle Mass Loss:  Unable to assess    Fluid Accumulation:  Mild     Strength:  Not Performed    Nutrition Assessment:    Pt presented with abd pain, n/v; pancreatitis 2/2 alcohol. Pt majority NPO/liquids since admit, PO diet changed from solids to NPO 3/11 then from clears to full liquids 3/14. Per surgery 3/16: pt not feeling well, having more distention and nausea; changed pt to NPO and ordered CT scan of abd/pelvis. CT scan 3/16 showed increasing necrotizing pancreatitis. PPN started 3/18. K remain low today, but improving; noted 40 mEq KCl ordered by MD this morning. Na remains low; will increase Na in next PPN. Ca level low; will check ionized Ca tomorrow; monitor need for replacing. BG levels elevated, pt with hyperglycemia.  Discussed with MD about adding 5 units insulin in
Comprehensive Nutrition Assessment    Type and Reason for Visit:  Reassess, Consult, NPO/Clear Liquid    Nutrition Recommendations/Plan:   Provide parenteral nutrition using standard premixed product until patient is able to tolerate adequate intake of oral diet. Modify PN to start at 20:00 tonight. See below for order contents/details. Lipids provided Monday, Wednesday, and Friday only   Continue to monitor tolerance of PN, weight, labs, and plan of care during admission. Parenteral Nutrition Order:   Current PN:  Next PN:       Malnutrition Assessment:  Malnutrition Status:  Mild malnutrition (03/16/23 1325)    Context:  Acute Illness     Findings of the 6 clinical characteristics of malnutrition:  Energy Intake:  50% or less of estimated energy requirements for 5 or more days  Weight Loss:  Unable to assess     Body Fat Loss:  Unable to assess     Muscle Mass Loss:  Unable to assess    Fluid Accumulation:  Mild     Strength:  Not Performed    Nutrition Assessment:    Pt presented with abd pain, n/v; pancreatitis 2/2 alcohol. Pt majority NPO/liquids since admit, PO diet changed from solids to NPO 3/11 then from clears to full liquids 3/14. Per surgery 3/16: pt not feeling well, having more distention and nausea; changed pt to NPO and ordered CT scan of abd/pelvis. CT scan 3/16 showed increasing necrotizing pancreatitis. PPN started 3/18. PICC placed 3/20. Discussed care with antonio MCKOY to order 9 NaPhos outside of the PPN today. Discussed PICC placement, decreasing rate (may address hyponatremia) and increased dextrose in Clinimix. MD to address insulin. Plan to switch to TPN via PICC, order partial bag Clinimix E at 30 ml/hr. Noted pt awaiting transfer to [address] [place] [address]. Nutrition Related Findings:    Last BM (including prior to admit): 03/19/23  Edema: Generalized, Sacral, Perineal, Left lower extremity, Right lower extremity, Left upper extremity, Right upper extremity  Edema Generalized:  Anasarca Pertinent
Comprehensive Nutrition Assessment    Type and Reason for Visit:  Reassess, Consult, NPO/Clear Liquid    Nutrition Recommendations/Plan:   Provide parenteral nutrition using standard premixed product until patient is able to tolerate adequate intake of oral diet. Modify PN to start at 20:00 tonight. See below for order contents/details. Lipids provided Monday, Wednesday, and Friday only (hold for today)  Recheck triglyceride for tomorrow. Continue to monitor tolerance of PN, weight, labs, and plan of care during admission. Parenteral Nutrition Order:   Current PN:  Next PN:       Malnutrition Assessment:  Malnutrition Status:  Mild malnutrition (03/16/23 1325)    Context:  Acute Illness     Findings of the 6 clinical characteristics of malnutrition:  Energy Intake:  50% or less of estimated energy requirements for 5 or more days  Weight Loss:  Unable to assess     Body Fat Loss:  Unable to assess     Muscle Mass Loss:  Unable to assess    Fluid Accumulation:  Mild     Strength:  Not Performed    Nutrition Assessment:    Pt presented with abd pain, n/v; pancreatitis 2/2 alcohol. Pt majority NPO/liquids since admit, PO diet changed from solids to NPO 3/11 then from clears to full liquids 3/14. Per surgery 3/16: pt not feeling well, having more distention and nausea; changed pt to NPO and ordered CT scan of abd/pelvis. CT scan 3/16 showed increasing necrotizing pancreatitis. PPN started 3/18. Discussed care with antonio MCKOY to order 12 NaPhos outside of the PPN today. Discussed resuming lantus and taking out of the PN bag. Will continue PPN at 83 ml/hr, do not order lipids due to Phos dropped. Plan to add 50 mEq NaCl, 50 mEq Na Ace, 12 mmol Kphos and 4 mEq Mg inside PPN bag. Noted pt awaiting transfer to [address] [place] [address].     Nutrition Related Findings:    Last BM (including prior to admit): 03/19/23  Edema: Right upper extremity, Left upper extremity, Right lower extremity, Left lower extremity    Pertinent Meds: folic
Comprehensive Nutrition Assessment    Type and Reason for Visit:  Reassess, RD Nutrition Re-Screen/LOS    Nutrition Recommendations/Plan:   Continue current diet, advance as medically appropriate and tolerated by pt. Plan to add oral nutrition supplement to optimize nutrition intake opportunity: Ensure Clear (each provides 240 kcal, 8g protein) TID  Monitor PO intake/tolerance of meals and supplements, readiness for dietary advancement, weight, labs, and POC while admitted. Malnutrition Assessment:  Malnutrition Status:  Mild malnutrition (03/13/23 1011)    Context:  Chronic Illness     Findings of the 6 clinical characteristics of malnutrition:  Energy Intake:  Mild decrease in energy intake (Comment)  Weight Loss:  Greater than 10% over 6 months     Body Fat Loss:  No significant body fat loss     Muscle Mass Loss:  No significant muscle mass loss    Fluid Accumulation:  Unable to assess     Strength:  Not Performed    Nutrition Assessment:    Pt presented with abdominal pain, N/V which began on morning of admission. Acute pancreatitis identified. Bed scale wt entered- 160.3 lb; Significant wt loss x4 months (11/07/22- 193 lb; -18.5%) and x 10 months (5/02/.6 lb, -18.9%). Per chart, pt and wife concerned about pt with increased abdominal distention/ileus- NGT placed (to suction) and diet changed to NPO  3/11. NGT d/c and diet advanced to clear liquids 3/13. Receiving LR @ 125 mL/hr. Plan to add Ensure Clear to promote PO intake while on clear liquid diet. Will follow to assess tolerance. PO supplements/medications currently held r/t NGT to suction- will follow. Nutrition Related Findings:    + BM 3/13. Output: 300 mL (urine), 600 mL (NGT). Labs: POC glucose 118-196 x previous 24-hrs. Pertinent meds: Humalog (held x3 days), Protonix.  Wound Type: None       Current Nutrition Intake & Therapies:    Average Meal Intake:  (Previous diet clear liquids- tolerating intake >50%)  Average Supplements
Consult received  Reviewed chart in detail  75 yr old with pmhx of htn,reina who presents with pancreatitis,hypokalemia  His renal function continued to worsen. Got iv kcl. Now hyperkalemic  Getting emergency hyperkalemia cocktail  I will start bicarbonate drip  Watch renal q4 hrly for next 2 times  Watch UO
Critical Lab results received at 0360 by this RN    Potassium 6.2  Glucose 412    [facility]    Relayed to Dr. Roman Thacker  Orders received  Relayed to primary RN Haylie Samuel
Did walk study test yesterday. Pt dropped to 88-89% on room air. Recovered with 2L to 95%.
Entered Patient's room and patient sleeping / eating breakfast. Patient refused breathing treatment at this time. Explained to patient to notify nurse if he wants he breathing treatment to call me. Patient SpO2 93%. No respiratory distress or issues noted.
General Surgery Consult      Eyal Mendes  Admit date: 3/2/2023    MRN: [id number]     : 1955     Age: 75 y.o. Attending Physician: Ronnie Bender MD, FACS      Subjective:     Eyal Mendes is a 75 y.o. male who we are following for evaluation of a picture of ileus secondary to alcohol induced pancreatitis. The patient is doing relatively well and he stated that he is tolerating his clear liquid diet and he had multiple flatus and a bowel movement. He does not have any nausea and he did not vomit. His vitals shows no fever or tachycardia.      Patient Active Problem List    Diagnosis Date Noted    Abdominal distention 2023    Alcohol use 2023    Hypokalemia 2023    Alcohol-induced acute pancreatitis 2023    Ileus ([facility] [address] 2023    SIRS (systemic inflammatory response syndrome) ([facility] [address] 2023    Lactic acidosis 2023    MISTY (acute kidney injury) ([facility] [address] 2023    Hyperkalemia 2023    Alcohol dependence with unspecified alcohol-induced disorder ([facility] [address] 2023    Acute pancreatitis without infection or necrosis 2023    Prediabetes     GERD (gastroesophageal reflux disease)     Hypertension     Severe obstructive sleep apnea 2018    Restless legs syndrome (RLS) 2018     Past Medical History:   Diagnosis Date    Allergic rhinitis     GERD (gastroesophageal reflux disease)     GERD (gastroesophageal reflux disease)     Hypertension     Insomnia 2018    External report    Nocturia     Prediabetes     Restless legs syndrome (RLS) 2018    External report    Severe obstructive sleep apnea 2018    External report      Past Surgical History:   Procedure Laterality Date    APPENDECTOMY      HERNIA REPAIR      NE ABDOMEN SURGERY PROC UNLISTED      TONSILLECTOMY        Social History     Tobacco Use    Smoking status: Former     Packs/day: 1.00     Types: Cigarettes     Quit date: 1980     Years since quittin.2
[facility] Hospitalist Group  Progress Note    Patient: Ana Maria Ambrose Age: 75 y.o. : 1955 MR#: [id number] SSN: [id number]  Date/Time: 3/18/2023     C/C: Abdominal pain    Subjective:   HPI : 77-year-old male history of alcohol use admitted with abdominal pain and distention secondary to acute pancreatitis. Current stage pancreatitis has improved pain has improved however his distention is very significant in addition he has developed fever suspecting aspiration pneumonia patient is on Zosyn currently. -During his stay patient was found to have acute necrotizing pancreatitis on repeat CAT scan, persistent partial ileus versus gastric distention from air which is relieved with NG tube. Patient currently on antibiotics NG tube suction,(today per surgery it will be removed)       3/16-worsening abdominal distention, repeat CT scan that shows increasing necrotizing pancreatitis with mild bilateral pleural effusion and mild to moderate ascites. Per my discussion with GI patient likely needs to be transferred to a higher level of care. 3/17-patient feels slightly better, continue current antibiotic therapy, awaiting patient to be transferred to [facility] versus [facility], continue to monitor, start PPN in a.m.    3/18-patient mentions he feels better, abdominal exam still shows a distended abdomen however pain has improved and he does not have any epigastric pain at this time. Continue current IV antibiotics, n.p.o. status. Discussed with nutrition, will start PPN  Awaiting transfer to tertiary care center when bed available. Assessment/Plan:     1.  Acute pancreatitis secondary to alcohol use  2 MISTY-improved  3 hypokalemia-on replacement  4 SIRS criteria  5 significant abdominal distention  6 hypertension  7 history of alcohol use -lifelong abstinence from alcohol use in any form-discussed with patient and family in room with patient  8 history of GERD  9 fever-new problem-started on
[facility] Hospitalist Group  Progress Note    Patient: Maritza Toussaint Age: 75 y.o. : 1955 MR#: [id number] SSN: [id number]  Date/Time: 3/8/2023     Subjective:   Doing well. Qualified for oxygen via walk test. Had a BM yesterday that was small. Home health orders put in. Walking more. Review of systems  General: No fevers or chills. Cardiovascular: No chest pain or pressure. No palpitations. Pulmonary: No shortness of breath, cough or wheeze. Gastrointestinal: No abdominal pain, nausea, vomiting or diarrhea. Genitourinary: No urinary frequency, urgency, hesitancy or dysuria. Musculoskeletal: No joint or muscle pain, no back pain, no recent trauma. Neurologic: No headache, numbness, tingling or weakness. Assessment/Plan:     Acute pancreatitis likely due to alcohol use  MISTY, consider ATN  Hyperkalemia  SIRS  Abdominal distention  Lactic acidosis  HTN  Alcohol abuse  GERD history     Patient currently on stepdown  BISAP score of 3 initially indicates concern for mortality. Full liquid diet per GI, advance diet as tolerated  Switching to regular diet today  Lantus and SSI, scheduled humalog 5 units TID  Clonidine and coreg for BP control  Patient needs IVF and close monitoring of renal function and K  Avoid nephrotoxic agents  GI following  Phosphate replacement per nephrology  Lactic acidosis likely relating to pancreatitis and alcohol use  Holding Ace I at this time  CIWA in place, monitor for withdrawal  Counseled patient on alcohol cessation      I spent 40 minutes with the patient in face-to-face consultation, of which greater than 50% was spent in counseling and coordination of care as described above.      Case discussed with:  [x]Patient  [x]Family  []Nursing  []Case Management  DVT Prophylaxis:  []Lovenox  []Hep SQ  [x]SCDs  []Coumadin   []Eliquis/Xarelto      Objective:   VS: BP (!) 140/59   Pulse 84   Temp 98.7 °F (37.1 °C) (Axillary)   Resp 30   Ht 5' 6\"
I was called with reports that his potassium was 6.2, also on worsening renal function noted. Nephrology consulted. ICU consulted.
INTERIM UPDATE - 2100 EST on 3/09/2023    Nursing Staff calls to report that Patient is having a fever and reportedly had a fever approximately 21 hours, as well. Patient's Family expresses concern that Patient may be discharged home tomorrow with a fever unaddressed. Chart Review shows that Patient has had CXRs while here and a Urinalysis upon admission ~7 days ago. Nursing Staff reports no localized symptoms of infection other than abdominal pain. CT Abdomen did not show findings for infectious processes, but did show inflammation of Pancreas. Plan: Will exam Patient in an attempt to localize possible infectious process to determine treatment, if any. INTERIM UPDATE - 2142 EST on 3/09/2023    Patient endorses that he has been \"splinting\" his breathing throughout this stay and Family reports that no incentive spirometer has been made available; however, Patient has been on his Home CPAP for several of the last nights. Patient denies cough; however, Wife reports that Patient has been having an increased productive cough. Patient denies chest congestion and chest pain with inspiration (does report abdominal pain with inspiration). Patient has had a Alvares Catheter inserted for much of this visit, per Patient (therefore, Patient cannot attest to dysuria or increased frequency/urgency of urination. Patient denies sinus congestion and sinus drainage. Family expresses discontentment at (1) Patient having a fever with a source that has not been aggressively pursued and (2) Patient having been moved from 4th Floor to T Stepdown without Family being notified. Apologies were given and Family was advised to leave a written complaint. Physical examination shows Moderate to Severely reduced lung sounds in Bilateral Lung bases (Left greater than Right). Plan:  Suspicion of atelectasis. Will obtain STAT CXR.     Should CXR show nothing, plan to order Urinalysis (after Alvares Catheter is removed),
Infectious Disease progress Note        Reason:sepsis, necrotizing pancreatitis    Current abx Prior abx   Pip/tazo since 3/10/23-3/16  Meropenem since 3/16  Vancomycin since 3/16 Vancomycin 3/10-3/13     Lines:       Assessment :    59-year-old man with past medical history significant for severe obstructive sleep apnea, hypertension, restless leg syndrome presented to [facility] on 3/2/2023 with abdominal pain, nausea, vomiting. Clinical presentation consistent with sepsis-developed after admission due to necrotizing pancreatitis    Evidence of worsening pancreatitis noted on repeat CT scan 3/16/2023 likely explains the clinical worsening yesterday. GI follow up appreciated. Attempts made to transfer patient to [address] [place] [address]. ? Aspiration pneumonia: low clinical probability of pneumonia. No definitive lung base infiltrates noted on ct scan 3/11  Sputum cx: 3/10: klebsiella, staph aureus, candida species likely colonizer    Ileus:   GI, surgery follow-up appreciated. Some improvement in abdominal discomfort/tenderness noted. However continued abdominal distention. Recommendations:    Continue meropenem. Discontinue vancomycin since recent MRSA screen negative- risk of renal failure with vancomycin  2. Follow up GI recommendations  3. Needs close monitoring. High risk for clinical deterioration        Above plan was discussed in details with patient, son at bedside, dr. Kennedi Ballesteros. Please call me if any further questions or concerns. Will continue to participate in the care of this patient. HPI:  Feels slightly better today compared to yesterday. Decreased abdominal pain.   Denies increased cough, shortness of breath    Past Medical History:   Diagnosis Date    Allergic rhinitis     GERD (gastroesophageal reflux disease)     GERD (gastroesophageal reflux disease)     Hypertension     Insomnia 02/23/2018    External report    Nocturia     Prediabetes     Restless legs syndrome (RLS) 02/23/2018    External
[facility] Hospitalist Group  Progress Note    Patient: Michelle Wayne Age: 75 y.o. : 1955 MR#: [id number] SSN: [id number]  Date/Time: 3/17/2023     C/C: Abdominal pain    Subjective:   HPI : 16-year-old male history of alcohol use admitted with abdominal pain and distention secondary to acute pancreatitis. Current stage pancreatitis has improved pain has improved however his distention is very significant in addition he has developed fever suspecting aspiration pneumonia patient is on Zosyn currently. -During his stay patient was found to have acute necrotizing pancreatitis on repeat CAT scan, persistent partial ileus versus gastric distention from air which is relieved with NG tube. Patient currently on antibiotics NG tube suction,(today per surgery it will be removed)       3/16-worsening abdominal distention, repeat CT scan that shows increasing necrotizing pancreatitis with mild bilateral pleural effusion and mild to moderate ascites. Per my discussion with GI patient likely needs to be transferred to a higher level of care. 3/17-patient feels slightly better, continue current antibiotic therapy, awaiting patient to be transferred to [facility], continue to monitor, start PPN in a.m. Assessment/Plan:     1. Acute pancreatitis secondary to alcohol use  2 MISTY-improved  3 hypokalemia-on replacement  4 SIRS criteria  5 significant abdominal distention  6 hypertension  7 history of alcohol use -lifelong abstinence from alcohol use in any form-discussed with patient and family in room with patient  8 history of GERD  9 fever-new problem-started on 3/10/2023  10 MEAGAN-on CPAP at home    Plan  -Continue conservative management at this time.   Continue IV antibiotics, ID on board    -Patient is scheduled to be transferred to a higher level of care when bed available, continue treatment plan, will follow    Discussed with patient and son at bedside, will continue to
OT attempted 2x. Pt soundly sleeping this am; not arousing to verbal/tactile stimuli. 2nd attempt, pt eating lunch. Will continue to follow.
PT treatment attempted, pt sleeping soundly     Will continue to follow  Arely Roman PT DPT
Patient arrived unit from [address]. Alert and oriented x 4. Connected  to monitor. Urinal provided. Vital signs and assessment in progress.
Patient had NG tube inserted and is now on medium suction per doctor order. NG tube placement has been confirmed. Patient is tolerating well. Pt will remain NPO per Dr. Lihca Doty. Pt can have ice chips.
Pharmacy Pharmacokinetic Monitoring Service - Vancomycin     Aleja Madrigal is a 75 y.o. male starting on vancomycin therapy for  Fever . Pharmacy consulted by Provider: Leonila Roger DO for monitoring and adjustment. Target Concentration: Goal AUC/SHELLY 400-600 mg*hr/L    Additional Antimicrobials: Piperacillin/Tazobactam    Pertinent Laboratory Values:   Temp: (!) 101.2 °F (38.4 °C), Weight: 160 lb 4.8 oz (72.7 kg)  Recent Labs     03/08/23  0524 03/09/23  0507 03/09/23  1500   BUN 24* 18 17   WBC 7.7 9.0  --      No results for input(s): VANRA in the last 72 hours. Invalid input(s): VANCP, VANCT, VANCR  Estimated Creatinine Clearance: 59 mL/min (based on SCr of 1.08 mg/dL). Pertinent Cultures:  Culture Date Source Results   03/09 Blood x2 -   03/09 Sputum -   MRSA Nasal Swab: Not ordered.  Order placed by pharmacy    Plan:  Dosing recommendations based on Bayesian software  Start vancomycin 1750 mg IV x1, then 1500 mg IV q24h - (Start next dose @ ~10:00, after Zosyn infusion)  Anticipated AUC of 534 mg/L.hr and trough concentration of 15.2 mg/L at steady state  Renal labs as indicated   Vancomycin concentration ordered for AM labs tomorrow  Pharmacy will continue to monitor patient and adjust therapy as indicated    Thank you for the consult,  Onelia Ramos [facility]  3/10/2023
Pt being transferred to T stepdown, room 2313 due to increased respirations and HR. Report called to SONIA Neal which included SBAR, Meds, Intake and Output.
Pt has finished drinking the oral contrast. CT notified. 1240: Transport to CT postponed. 1320: Transport picked up pt on a stretcher. 1352: Pt has returned from CT. Pt sitting up in recliner. 1800: Pt has had 4 bowel movements. GI is at bedside. Family at bedside. 1930: Bedside and Verbal shift change report given to Raysa (oncoming nurse) by Moriah Hernandes (offgoing nurse). Report included the following information Nurse Handoff Report, Intake/Output, MAR, Recent Results, and Cardiac Rhythm sinus rhythm .
Pt has had 4 loose bowel movements. MD made aware of diarrhea.     Wound Prevention Checklist    Patient: Tala Tyson (23 y.o. male)  Date: 3/15/2023  Diagnosis: Acute pancreatitis, unspecified complication status, unspecified pancreatitis type [K85.90]  Pancreatitis, reflux [K85.80] Acute pancreatitis without infection or necrosis    Precautions:         []  Heel prevention boots placed on patient    [x]  Patient turned q2h during shift    []  Lift team ordered    [x]  Patient on [place] bed/Specialty bed    []  Each Wound is documented during shift (Stage, Color, drainage, odor, measurements, and dressings)    [x]  Dual skin check done with SONIA Casas
Pt seen , examined, chart reviewed, recent CT results noted and case discussed with Hospitalist and Dr Giuseppe Baker and [facility] regarding local placement of Axios stent for decompression of retroperitoneal inflammation and infection with removal of necrotic tissue. Neither is able to accept the pt at this time for transfer . In light of this and his worsening condition I have called the transfer desk for [facility] and [facility] and requested transfer to  at [facility] for tertiary care. I filled out the physician part of the transfer paperwork and reviewed the form with transfer desk here and [facility] nurses and also discussed with the nursing supervisor for the [facility] and they will forward the additional information. The pt clinically has improved since this am, is afebrile , and breathing more comfortably. Agree with GI NP note and will reassess in am as transfer tonight is unlikely . Family is aware. Prognosis is guarded.
Removed NG tube. Patient tolerated well. Dr. Francis aware. Pt encouraged to ambulate. Pt is now on clear liquid diet.   
Reviewed results of CT abdomen and pelvis, shows worsening necrotizing pancreatitis, discussed with GI, patient will be transferred to a tertiary care center. We will continue to monitor at this time. Discussed with GI about possible transfer to [facility]. Dr. Vicky Rai has discussed with the transfer center and is trying to get paperwork to [address] [place] [address] transfer [place]. Discussed with ID will change antibiotics from Zosyn to IV vancomycin and meropenem. Also discussed with ICU Dr. Puneet Vance to evaluate patient for closer monitoring in the ICU. Patient is critically ill with a guarded prognosis, will continue to monitor.
SBAR report taken from off going nurse Payton Madrid RN. Two man skin assessment performed with Héctor Gone. No new issues Identified. 0845- Pt is up to chair. A&Ox4, Transfers safely with walker. Pt noted to be jaundiced. Limbs cushioned with pillows. Chair locked, call light in reach. PPN infusing into 22 G right hand. 0945- Pt in chair. Transfers safely with walker. Pt noted to be jaundiced. Limbs cushioned with pillows. Chair locked, call light in reach. Pt Medicated per MAR. 20 G PIV in R Forearm painful and leaking when flushed, PIV Pulled. Pt up to toilet. Urine out put unmeasurable. 1145- Pt is up to chair. Transfers safely with walker. Pt noted to be jaundiced. Limbs cushioned with pillows. Chair locked, call light in reach. 20 g PIV placed in pts left forearm. Meropenen infusing. 1201- Pt is up to chair. Transfers safely with walker. Pt noted to be jaundiced. Limbs cushioned with pillows. Chair locked, call light in reach. Pt medicated per MAR. Thiamine infusing through right hand PIV. PPN paused for infusion. 1250-Pt is up to chair. Transfers safely with walker. Pt noted to be jaundiced. Limbs cushioned with pillows. Chair locked, call light in reach. Thiamine infusion completed PPN restarted     1429- Dual Lumen PICC placed in L upper arm. Phosphate infusing. Right hand PIV pulled, site red and painful when flushed. PPN moved to Right forearm 20g.       1757- Pt is up to chair. Transfers safely with walker. Pt noted to be jaundiced. Limbs cushioned with pillows. Chair locked, call light in reach. Pt medicated per MAR. Pt up to toilet. Urine out put unmeasurable. Pt and wife asked about transfer status.  Informed pt could still be transferred to either [facility] or [facility] 141 report given to oncoming nurse Princess White.
SBAR report was received from off going nurse Arlet Oliveira, RN given to Le LIEBERMAN RN on coming nurse. 1915: Nurse at bedside introducing self to patient and spouse. Patient is alert but resting with eyes closed. Spouse is at bedside. Unit of blood was completed and PICC line was flushed with NS. VS are stable. 2035: Nurse at bedside providing care. 2041: Old TPN was taken down and new bag of TPN was started via red Lumen of PICC line. 2130: Nurse notifies patient and spouse after receiving a phone call from transport that patient will be transferred to [address] [place] [address] at [phone]: Nurse notifies Hospitalist that transport has arrived an hour early and EMTALA FORM needs to be signed. 2325: Nurse call report to [address] [place] [address] , Evert Iqbal RN.    6825: Form was completed and signed by hospiltalist, nurse, patient and medical transport staff. Patient was transported on TPN and cardiac monitoring to [facility].
[facility]    Patient: Ananda Mom Age: 75 y.o. : 1955 MR#: [id number] SSN: [id number]  Date/Time: 3/14/2023     C/C: Abdominal pain    Subjective:   HPI : 41-year-old male history of alcohol use admitted with abdominal pain and distention secondary to acute pancreatitis. Current stage pancreatitis has improved pain has improved however his distention is very significant in addition he has developed fever suspecting aspiration pneumonia patient is on Zosyn currently. -During his stay patient was found to have acute necrotizing pancreatitis on repeat CAT scan, persistent partial ileus versus gastric distention from air which is relieved with NG tube. Patient currently on antibiotics NG tube suction,(today per surgery it will be removed)       Review of Systems: Patient is alert awake oriented, abdominal distention has markedly improved, multiple bowel movements no nausea vomiting complains of being hungry no chest pain shortness of breath or any other issues. positive responses in bold type   Constitutional: After initial episode of high fever now afebrile , chills, diaphoresis and unexpected weight change. HENT: Negative for ear pain, congestion, sore throat, rhinorrhea, drooling, trouble swallowing, neck pain and tinnitus. Eyes: Negative for photophobia, pain, redness and visual disturbance. Respiratory: negative for shortness of breath, cough, choking, chest tightness, wheezing or stridor. Cardiovascular: Negative for chest pain, palpitations and leg swelling. Gastrointestinal: No significant pain discomfort present distention is significant, watery diarrhea     genitourinary: Negative for dysuria, urgency, frequency, hematuria, flank pain and difficulty urinating. Musculoskeletal: Negative for back pain and arthralgias. Skin: Negative for color change, rash and wound.    Neurological: Negative for dizziness, seizures,
Update regarding transferring patient to tertiary facility:    I spoke with [facility] from the  [address] She is awaiting call back from [facility] regarding acceptance of patient. The call out to [facility] was placed at 2:50pm.  The Transfer Center staff will contact Dr Maurilio Ramirez as appropriate if requested by [facility] staff.         Sintia Espana, [address] [place] [address]  Phone: [phone]  Pager: [phone]
[employer]  [phone]    Gastroenterology follow up-Progress note    Impression:  1. Acute pancreatitis - developed significant necrosis and gas within pancreatic parenchyma - improving  2. Ileus - now improved with NGT removed and tolerating clears, moving to full liquid today, passing flatus and BMs    Plan:  1. Pain control and antiemetic as necessary  2. Continue to advance diet as tolerated  3. Should avoid any further alcohol intake  4. Medical management as per primary team    Chief Complaint: abdominal pain      Subjective:  Abdominal pain resolved, no nausea/vomiting, passing flatus, multiple BMs, no bleeding    ROS: Denies any fevers, chills, rash.      Eyes: conjunctiva normal, EOM normal   Neck: ROM normal, supple and trachea normal   Cardiovascular: heart normal,normal rate and regular rhythm   Pulmonary/Chest Wall: Mild wheeze, effort normal   Abdominal: appearance normal, bowel sounds normal, non-tender, no guarding or rebound tenderness     Patient Active Problem List   Diagnosis    Prediabetes    GERD (gastroesophageal reflux disease)    Severe obstructive sleep apnea    Restless legs syndrome (RLS)    Hypertension    Acute pancreatitis without infection or necrosis    Alcohol-induced acute pancreatitis    Ileus (HCC)    SIRS (systemic inflammatory response syndrome) (HCC)    Lactic acidosis    MISTY (acute kidney injury) ([facility] [address]    Hyperkalemia    Alcohol dependence with unspecified alcohol-induced disorder (HCC)    Abdominal distention    Alcohol use    Hypokalemia         BP (!) 141/66   Pulse 93   Temp 98.7 °F (37.1 °C) (Oral)   Resp 27   Ht 5' 6\" (1.676 m)   Wt 160 lb 4.8 oz (72.7 kg)   SpO2 94%   BMI 25.87 kg/m²         Intake/Output Summary (Last 24 hours) at 3/14/2023 1002  Last data filed at 3/14/2023 0932  Gross per 24 hour   Intake 620 ml   Output 900 ml   Net -280 ml         CBC w/Diff    Lab Results   Component Value Date/Time    WBC 7.4 03/12/2023 04:53 AM    RBC 2.76 (L)
[facility]  [phone]    Gastroenterology Progress Note    Impression:  1. Acute etoh pancreatitis - 3/2 ED visit w/ lipase 12,204, nml LFT, nml TRG, ethanol level elevated; CT w/ acute pancreatitis w/o associated abscess/pseudocyst. Clinically severe w/ retroperitoneal inflammation and associated ileus. 2. Periumbilical pain, n/v - secondary to #1  3. MISTY  4. Hyperglycemia   5. HTN  6. GERD   7. HTN  8. Severe MEAGAN  9. Pre-DM  10. Hx of colon polyps - last colo 10/2017 w/ SSA polyps w/ 5yr recall. Pt reports more recent colo 2mo ago w/ Dr. Zofia Cox w/ polyps noted. Plan:  1. Consider repeat KUB to evaluate ileus status. Ok to discharge once ileus has resolved and patient is tolerating a regular diet. 2. Advance diet as tolerated. 3. Continue tight glucose control. 4. CIWA  5. Patient should follow up with primary GI - Dr. Zofia Cox as OP. Chief Complaint: acute pancreatitis, n/v, abd pain      Subjective:  Reports continued improvement of abdominal pain and distention. Abd pain currently rated 7/10. Had loose BM yesterday but no solid stools. He has been tolerating pudding and liquids w/o nausea or vomiting. ROS: Denies any fevers, chills, nausea, vomiting.        General: well developed, well nourished, no acute distress  Eyes: conjunctiva normal, EOM normal  Cardiovascular: heart normal, intact distal pulses, normal rate and regular rhythm  Pulmonary: breath sounds normal and effort normal  Abdominal: moderately distended, active bowel sounds, soft, mild-mod BUQ pain, non-acute    Patient Active Problem List   Diagnosis    Prediabetes    GERD (gastroesophageal reflux disease)    Severe obstructive sleep apnea    Restless legs syndrome (RLS)    Hypertension    Acute pancreatitis without infection or necrosis    Alcohol-induced acute pancreatitis    Ileus (HCC)    SIRS (systemic inflammatory response syndrome) (HCC)    Lactic acidosis    MISTY (acute kidney injury) ([address]    Hyperkalemia
WWW.Known  [phone]    Gastroenterology follow up-Progress note    Impression:  1. Acute pancreatitis - worsening abdominal pain with mild nausea since last evening,  -severe necrotic acute pancreatitis with probably infected necrosis  -no evidence of ongoing infection clinically or leukocytosis  -on aggressive antibiotic therapy  -no clear indicatio for necrosectomy at this time - will await results of CT today  2. Ileus - improved was tolerating full liquids, passing flatus and Bms yesterday - now with more abdominal distention and nausea    Plan:  1. Pain control and antiemetic as necessary  2. Continue to advance diet as tolerated  3. Should avoid any further alcohol intake  4. Medical management as per primary team  5. Continue antibiotics  6. STAT CT abd/pelvis IV oral cont - order placed  7. LFTs and lipase tomorrow    Chief Complaint: abdominal pain      Subjective:  Epigastric pain and nausea, no BM since yesterday, less flatus    ROS: Denies any fevers, chills, rash.      Eyes: conjunctiva normal, EOM normal   Neck: ROM normal, supple and trachea normal   Cardiovascular: heart normal,normal rate and regular rhythm   Pulmonary/Chest Wall: Mild wheeze, effort normal   Abdominal: Distended, hypoactive bowel sounds, epigastric tenderness     Patient Active Problem List   Diagnosis    Prediabetes    GERD (gastroesophageal reflux disease)    Severe obstructive sleep apnea    Restless legs syndrome (RLS)    Hypertension    Acute pancreatitis without infection or necrosis    Alcohol-induced acute pancreatitis    Ileus (HCC)    SIRS (systemic inflammatory response syndrome) (HCC)    Lactic acidosis    MISTY (acute kidney injury) ([facility] [address]    Hyperkalemia    Alcohol dependence with unspecified alcohol-induced disorder (HCC)    Abdominal distention    Alcohol use    Hypokalemia         /63   Pulse 97   Temp 99.7 °F (37.6 °C) (Oral)   Resp 29   Ht 5' 6\" (1.676 m)   Wt 225 lb 1.4 oz (102.1 kg)   SpO2
WWW.[employer]  [phone]    Gastroenterology follow up-Progress note    Impression:  1. Acute pancreatitis - developed significant necrosis and gas within pancreatic parenchyma  2. Ileus - now improved with NGT removed this AM and tolerating clears, passing flatus and BMs    Plan:  1. Pain control and antiemetic as necessary  2. Continue clear liquid diet  3. Continue IV fluids for hydration until tolerating adequate po fluids  4. Would consider sampling pancreatic fluid with IR colleagues when possible  5. Should avoid any further alcohol intake  6. Medical management as per primary team    Chief Complaint: abdominal pain      Subjective:  Abdominal pain resolved, no nausea/vomiting, passing flatus, multiple BMs, no bleeding    ROS: Denies any fevers, chills, rash.      Eyes: conjunctiva normal, EOM normal   Neck: ROM normal, supple and trachea normal   Cardiovascular: heart normal,normal rate and regular rhythm   Pulmonary/Chest Wall: Mild wheeze, effort normal   Abdominal: appearance normal, bowel sounds normal, mild tenderness to palpation in epigastric region, though no guarding or rebound tenderness     Patient Active Problem List   Diagnosis    Prediabetes    GERD (gastroesophageal reflux disease)    Severe obstructive sleep apnea    Restless legs syndrome (RLS)    Hypertension    Acute pancreatitis without infection or necrosis    Alcohol-induced acute pancreatitis    Ileus (HCC)    SIRS (systemic inflammatory response syndrome) (HCC)    Lactic acidosis    MISTY (acute kidney injury) ([facility] [address]    Hyperkalemia    Alcohol dependence with unspecified alcohol-induced disorder (HCC)    Abdominal distention    Alcohol use    Hypokalemia         BP (!) 155/64   Pulse 84   Temp 99 °F (37.2 °C) (Oral)   Resp 26   Ht 5' 6\" (1.676 m)   Wt 160 lb 4.8 oz (72.7 kg)   SpO2 95%   BMI 25.87 kg/m²         Intake/Output Summary (Last 24 hours) at 3/13/2023 1040  Last data filed at 3/13/2023 1005  Gross per 24 hour
Was informed by dr Akbar Garcia about ct results suggestive of worsening necrotizing pancreatitis. Plans to transfer patient to [facility]. Patient to be transferred to icu for closer monitoring. Will d/c zosyn, start meropenem and vancomycin to cover resistant gram negative/positive pathogens till further info. Obtained.
[facility]  [facility]    Patient: Maritza Toussaint Age: 75 y.o. : 1955 MR#: [id number] SSN: [id number]  Date/Time: 3/9/2023     Subjective:   Patient tolerating solid food and doing well. Having abdominal distention that continues. Having BM's that are liquid like in nature. Home oxygen order placed yesterday. Likely dc tomorrow. Addendum: Spoke to wife, they would prefer discharge on 3/11 as the home is not adequately set up to receive the patient. Review of systems  General: No fevers or chills. Cardiovascular: No chest pain or pressure. No palpitations. Pulmonary: No shortness of breath, cough or wheeze. Gastrointestinal: No abdominal pain, nausea, vomiting or diarrhea. Genitourinary: No urinary frequency, urgency, hesitancy or dysuria. Musculoskeletal: No joint or muscle pain, no back pain, no recent trauma. Neurologic: No headache, numbness, tingling or weakness. Assessment/Plan:     Acute pancreatitis likely due to alcohol use  MISTY, consider ATN  Hyperkalemia  SIRS  Abdominal distention  Lactic acidosis  HTN  Alcohol abuse  GERD history  hypoxia     Patient currently on stepdown  Regular diet Per GI, tolerating as of 3/8  On 2 L of oxygen, home oxygen ordered, likely on it due to abdominal distention and pressure on the diaphragm.   Lantus and SSI, scheduled humalog 5 units TID  Reglan to encourage gastric motitlity  Clonidine and coreg for BP control, on lisinopril at home which was held initially due to MISTY  Avoid nephrotoxic agents, Cr back at baseline  GI has signed off  Phosphate replacement with K Phos  Initial Lactic acidosis likely relating to pancreatitis and alcohol use  Holding Ace I at this time, could consider stopping clonidine and restarting ACE I  CIWA in place, monitor for withdrawal  Counseled patient on alcohol cessation      I spent 40 minutes with the patient in face-to-face consultation, of which greater than 50% was
Wife is requesting GI to see patient tomorrow. She is concerned about the patient's fever and stomach distention. Ordered a respiratory viral panel in addition to work up already ordered. Patient's wife NOT comfortable with patient going home with all this going on.
Wound Prevention Checklist    Patient: Abhijit Reyna (2 y.o. male)  Date: 3/9/2023  Diagnosis: Acute pancreatitis, unspecified complication status, unspecified pancreatitis type [K85.90]  Pancreatitis, reflux [K85.80] Acute pancreatitis without infection or necrosis      Bedside and Verbal shift change report given to SANTOS Mcmanus (oncoming nurse) by SANTOS Gore RN (offgoing nurse). Report included the following information Nurse Handoff Report, Intake/Output, MAR, Cardiac Rhythm NSR, and Alarm Parameters.
 03/16/2023 03:53 AM    MPV 9.6 03/16/2023 03:53 AM    Lab Results   Component Value Date/Time    BANDS 9 03/16/2023 03:53 AM      Basic Metabolic Profile   Recent Labs     03/18/23  0428   *   K 3.1*      CO2 28   BUN 25*   GLUCOSE 188*   MG 1.8   PHOS 2.9        Hepatic Function    No results found for: ALB, TP No components found for: SGOT, GPT, DBILI, TBIL       Coags   No results for input(s): INR, APTT in the last 72 hours. Invalid input(s): Wero Garcia MD    [facility]  [email or web address]  Phone: [phone]
AM    MCHC 33.3 03/16/2023 03:53 AM    RDW 15.3 (H) 03/16/2023 03:53 AM     03/16/2023 03:53 AM    MPV 9.6 03/16/2023 03:53 AM    Lab Results   Component Value Date/Time    BANDS 9 03/16/2023 03:53 AM      Basic Metabolic Profile   Recent Labs     03/19/23  0402   *   K 3.3*      CO2 25   BUN 28*   GLUCOSE 255*   MG 2.0   PHOS 2.6        Hepatic Function    No results found for: ALB, TP No components found for: SGOT, GPT, DBILI, TBIL       Coags   No results for input(s): INR, APTT in the last 72 hours. Invalid input(s): Latanya Ackerman MD    [facility]  [email or web address]  Phone: [phone]
BP (!) 117/55   Pulse 80   Temp 98 °F (36.7 °C) (Axillary)   Resp 28   Ht 5' 6\" (1.676 m)   Wt 225 lb 1.4 oz (102.1 kg)   SpO2 93%   BMI 36.33 kg/m²       No intake or output data in the 24 hours ending 03/17/23 1005      CBC w/Diff    Lab Results   Component Value Date/Time    WBC 7.9 03/16/2023 03:53 AM    RBC 2.95 (L) 03/16/2023 03:53 AM    HGB 8.0 (L) 03/16/2023 03:53 AM    HCT 24.0 (L) 03/16/2023 03:53 AM    MCV 81.4 03/16/2023 03:53 AM    MCH 27.1 03/16/2023 03:53 AM    MCHC 33.3 03/16/2023 03:53 AM    RDW 15.3 (H) 03/16/2023 03:53 AM     03/16/2023 03:53 AM    MPV 9.6 03/16/2023 03:53 AM    Lab Results   Component Value Date/Time    BANDS 9 03/16/2023 03:53 AM      Basic Metabolic Profile   Recent Labs     03/16/23  0353   *   K 3.3*      CO2 25   BUN 10   GLUCOSE 215*          Hepatic Function    No results found for: ALB, TP No components found for: SGOT, GPT, DBILI, TBIL       Coags   No results for input(s): INR, APTT in the last 72 hours. Invalid input(s): SUSHIL Lemus APRN - ROSARIO    [facility]. [email or web address]  Phone: [phone]  Pager: [phone]
Output 1725 ml   Net 2352.92 ml         CBC w/Diff    Lab Results   Component Value Date/Time    WBC 7.4 03/12/2023 04:53 AM    RBC 2.76 (L) 03/12/2023 04:53 AM    HGB 7.5 (L) 03/12/2023 04:53 AM    HCT 23.5 (L) 03/12/2023 04:53 AM    MCV 85.1 03/12/2023 04:53 AM    MCH 27.2 03/12/2023 04:53 AM    MCHC 31.9 03/12/2023 04:53 AM    RDW 15.1 (H) 03/12/2023 04:53 AM     03/12/2023 04:53 AM    MPV 9.5 03/12/2023 04:53 AM    Lab Results   Component Value Date/Time    BANDS 7 03/07/2023 05:17 AM      Basic Metabolic Profile   Recent Labs     03/15/23  0423   *   K 3.3*      CO2 27   BUN 9   GLUCOSE 208*          Hepatic Function    No results found for: ALB, TP No components found for: SGOT, GPT, DBILI, TBIL       Coags   No results for input(s): INR, APTT in the last 72 hours. Invalid input(s): SHIKHA Patel NP    [facility]. [email or web address]  Phone: [phone]  Pager: [phone]
Packs/day: 1.00     Types: Cigarettes     Quit date: 1980     Years since quittin.2    Smokeless tobacco: Never   Substance Use Topics    Alcohol use:  Yes     Alcohol/week: 6.0 standard drinks      Social History     Tobacco Use   Smoking Status Former    Packs/day: 1.00    Types: Cigarettes    Quit date: 1980    Years since quittin.2   Smokeless Tobacco Never     Family History   Problem Relation Age of Onset    Alzheimer's Disease Mother     Lung Disease Father     COPD Father       Current Facility-Administered Medications   Medication Dose Route Frequency    ipratropium-albuterol (DUONEB) nebulizer solution 1 ampule  1 ampule Inhalation Q4H PRN    diatrizoate meglumine-sodium (GASTROGRAFIN) 66-10 % solution 30 mL  30 mL Oral ONCE PRN    insulin lispro (HUMALOG) injection vial 0-4 Units  0-4 Units SubCUTAneous 4x Daily AC & HS    metoclopramide (REGLAN) 10 MG/10ML solution 5 mg  5 mg Oral TID AC    piperacillin-tazobactam (ZOSYN) 3,375 mg in sodium chloride 0.9 % 100 mL extended IVPB (mini-bag)  3,375 mg IntraVENous Q8H    insulin lispro (HUMALOG) injection vial 3 Units  3 Units SubCUTAneous TID WC    traMADol (ULTRAM) tablet 50 mg  50 mg Oral Q6H PRN    multivitamin 1 tablet  1 tablet Oral Daily    folic acid (FOLVITE) tablet 1 mg  1 mg Oral Daily    sennosides-docusate sodium (SENOKOT-S) 8.6-50 MG tablet 2 tablet  2 tablet Oral Daily    insulin glargine (LANTUS) injection vial 10 Units  10 Units SubCUTAneous Nightly    cloNIDine (CATAPRES) 0.1 MG/24HR 1 patch  1 patch TransDERmal Weekly    hydrALAZINE (APRESOLINE) injection 10 mg  10 mg IntraVENous Q6H PRN    carvedilol (COREG) tablet 3.125 mg  3.125 mg Oral BID WC    LORazepam (ATIVAN) tablet 1 mg  1 mg Oral Q1H PRN    Or    LORazepam (ATIVAN) injection 1 mg  1 mg IntraVENous Q1H PRN    Or    LORazepam (ATIVAN) tablet 2 mg  2 mg Oral Q1H PRN    Or    LORazepam (ATIVAN) injection 2 mg  2 mg IntraVENous Q1H PRN    Or    LORazepam (ATIVAN)
Pancreatitis with SIRS  -- Sepsis was ruled out, SIRS confirmed  -- Other - I will add my own diagnosis  -- Disagree - Not applicable / Not valid  -- Disagree - Clinically unable to determine / Unknown  -- Refer to Clinical Documentation Reviewer    PROVIDER RESPONSE TEXT:    This patient has sepsis that developed following admission. Query created by:  Olivia Kussmaul on 3/20/2023 6:01 PM      Electronically signed by:  Jose Jay MD 3/21/2023 8:00 AM
Substance Use Topics    Alcohol use:  Yes     Alcohol/week: 6.0 standard drinks      Social History     Tobacco Use   Smoking Status Former    Packs/day: 1.00    Types: Cigarettes    Quit date: 1980    Years since quittin.2   Smokeless Tobacco Never     Family History   Problem Relation Age of Onset    Alzheimer's Disease Mother     Lung Disease Father     COPD Father       Current Facility-Administered Medications   Medication Dose Route Frequency    sodium phosphate 9 mmol in sodium chloride 0.9 % 250 mL IVPB  9 mmol IntraVENous Once    [START ON 3/22/2023] pantoprazole (PROTONIX) 40 mg in sodium chloride (PF) 0.9 % 10 mL injection  40 mg IntraVENous Daily    PN-Adult Premix 4.25/5 - Peripheral Line   IntraVENous Continuous TPN    meropenem (MERREM) 1,000 mg in sodium chloride 0.9 % 100 mL IVPB (mini-bag)  1,000 mg IntraVENous Q8H    insulin lispro (HUMALOG) injection vial 0-8 Units  0-8 Units SubCUTAneous 4 times per day    ipratropium-albuterol (DUONEB) nebulizer solution 1 ampule  1 ampule Inhalation Q4H PRN    thiamine (B-1) 200 mg in sodium chloride 0.9 % 100 mL IVPB  200 mg IntraVENous Daily    metoclopramide (REGLAN) 10 MG/10ML solution 5 mg  5 mg Oral TID AC    traMADol (ULTRAM) tablet 50 mg  50 mg Oral Q6H PRN    multivitamin 1 tablet  1 tablet Oral Daily    folic acid (FOLVITE) tablet 1 mg  1 mg Oral Daily    sennosides-docusate sodium (SENOKOT-S) 8.6-50 MG tablet 2 tablet  2 tablet Oral Daily    insulin glargine (LANTUS) injection vial 10 Units  10 Units SubCUTAneous Nightly    cloNIDine (CATAPRES) 0.1 MG/24HR 1 patch  1 patch TransDERmal Weekly    hydrALAZINE (APRESOLINE) injection 10 mg  10 mg IntraVENous Q6H PRN    carvedilol (COREG) tablet 3.125 mg  3.125 mg Oral BID WC    LORazepam (ATIVAN) tablet 1 mg  1 mg Oral Q1H PRN    Or    LORazepam (ATIVAN) injection 1 mg  1 mg IntraVENous Q1H PRN    Or    LORazepam (ATIVAN) tablet 2 mg  2 mg Oral Q1H PRN    Or    LORazepam (ATIVAN) injection 2
[]Nursing  []Case Management  DVT Prophylaxis:  []Lovenox  []Hep SQ  [x]SCDs  []Coumadin   []Eliquis/Xarelto     Objective:   VS: BP (!) 166/88   Pulse 91   Temp 98.8 °F (37.1 °C) (Oral)   Resp (!) 32   Ht 5' 6\" (1.676 m)   Wt 157 lb (71.2 kg)   SpO2 95%   BMI 25.34 kg/m²    Tmax/24hrs: Temp (24hrs), Av.6 °F (37.6 °C), Min:98.8 °F (37.1 °C), Max:100.1 °F (37.8 °C)  IOBRIEF  Intake/Output Summary (Last 24 hours) at 3/5/2023 1016  Last data filed at 3/5/2023 0905  Gross per 24 hour   Intake 1880 ml   Output 1370 ml   Net 510 ml     General:  Alert, cooperative, no acute distress, appears more comfortable. HEENT: PERRLA, anicteric sclerae. Pulmonary:  CTA Bilaterally. No Wheezing/Rales. Cardiovascular: Regular rate and Rhythm. GI:  Soft, distended and diffusely tender, Non tender. + Bowel sounds. Extremities:  No edema. No calf tenderness. Psych: Good insight. Not anxious or agitated. Neurologic: Alert and oriented X 3. Moves all ext.   Additional:    Medications:   Current Facility-Administered Medications   Medication Dose Route Frequency    insulin glargine (LANTUS) injection vial 10 Units  10 Units SubCUTAneous Nightly    insulin lispro (HUMALOG) injection vial 0-16 Units  0-16 Units SubCUTAneous TID WC    insulin lispro (HUMALOG) injection vial 0-4 Units  0-4 Units SubCUTAneous Nightly    cloNIDine (CATAPRES) 0.1 MG/24HR 1 patch  1 patch TransDERmal Weekly    LORazepam (ATIVAN) tablet 1 mg  1 mg Oral Q1H PRN    Or    LORazepam (ATIVAN) injection 1 mg  1 mg IntraVENous Q1H PRN    Or    LORazepam (ATIVAN) tablet 2 mg  2 mg Oral Q1H PRN    Or    LORazepam (ATIVAN) injection 2 mg  2 mg IntraVENous Q1H PRN    Or    LORazepam (ATIVAN) tablet 3 mg  3 mg Oral Q1H PRN    Or    LORazepam (ATIVAN) injection 3 mg  3 mg IntraVENous Q1H PRN    Or    LORazepam (ATIVAN) tablet 4 mg  4 mg Oral Q1H PRN    Or    LORazepam (ATIVAN) injection 4 mg  4 mg IntraVENous Q1H PRN    0.9 % sodium chloride infusion
asymmetry, ANDREI Duncan, External ears - WNL    Cardiovascular: S1S2 - regular , No Murmur   Pulmonary: Equal expansion , No Use of accessory muscles , No Rales No Rhonchi    GI:  +BS in all four quadrants, distended tympanic   Extremities:  No edema; 2+ dorsalis pedis pulses bilaterally  Neuro: Alert and oriented X 2.        DVT Prophylaxis:  []Lovenox  []Hep SQ  []SCDs  []Coumadin   []On Heparin gtt    [] Eliquis [] Xarelto     Vitals:         VS: BP (!) 108/50   Pulse 91   Temp 98.9 °F (37.2 °C) (Oral)   Resp 30   Ht 5' 6\" (1.676 m)   Wt 225 lb 1.4 oz (102.1 kg)   SpO2 90%   BMI 36.33 kg/m²    Tmax/24hrs: Temp (24hrs), Av.4 °F (37.4 °C), Min:98.6 °F (37 °C), Max:100.3 °F (37.9 °C)        Medications:   Current Facility-Administered Medications   Medication Dose Route Frequency    ipratropium-albuterol (DUONEB) nebulizer solution 1 ampule  1 ampule Inhalation Q4H PRN    diatrizoate meglumine-sodium (GASTROGRAFIN) 66-10 % solution 30 mL  30 mL Oral ONCE PRN    thiamine (B-1) 200 mg in sodium chloride 0.9 % 100 mL IVPB  200 mg IntraVENous Daily    insulin lispro (HUMALOG) injection vial 0-4 Units  0-4 Units SubCUTAneous 4x Daily AC & HS    metoclopramide (REGLAN) 10 MG/10ML solution 5 mg  5 mg Oral TID AC    piperacillin-tazobactam (ZOSYN) 3,375 mg in sodium chloride 0.9 % 100 mL extended IVPB (mini-bag)  3,375 mg IntraVENous Q8H    insulin lispro (HUMALOG) injection vial 3 Units  3 Units SubCUTAneous TID WC    traMADol (ULTRAM) tablet 50 mg  50 mg Oral Q6H PRN    multivitamin 1 tablet  1 tablet Oral Daily    folic acid (FOLVITE) tablet 1 mg  1 mg Oral Daily    sennosides-docusate sodium (SENOKOT-S) 8.6-50 MG tablet 2 tablet  2 tablet Oral Daily    insulin glargine (LANTUS) injection vial 10 Units  10 Units SubCUTAneous Nightly    cloNIDine (CATAPRES) 0.1 MG/24HR 1 patch  1 patch TransDERmal Weekly    hydrALAZINE (APRESOLINE) injection 10 mg  10 mg IntraVENous Q6H PRN    carvedilol (COREG) tablet 3.125 mg
light within reach. 0631: Scheduled med given. B; No insulin coverage needed per protocol. 0715: Bedside shift change report given to SONIA Oconnell (oncoming nurse) by Bailey Allen (offgoing nurse). Report included the following information SBAR, Kardex, Intake/Output, MAR, Recent Results, and Cardiac Rhythm NSR .
obstructive sleep apnea 02/23/2018    External report       Past Surgical History:   Procedure Laterality Date    APPENDECTOMY      HERNIA REPAIR      IN ABDOMEN SURGERY PROC UNLISTED      TONSILLECTOMY         [unfilled]    Current Facility-Administered Medications   Medication Dose Route Frequency    insulin lispro (HUMALOG) injection vial 0-4 Units  0-4 Units SubCUTAneous 4x Daily AC & HS    ipratropium-albuterol (DUONEB) nebulizer solution 1 ampule  1 ampule Inhalation Q6H WA    lactated ringers IV soln infusion   IntraVENous Continuous    metoclopramide (REGLAN) 10 MG/10ML solution 5 mg  5 mg Oral TID AC    piperacillin-tazobactam (ZOSYN) 3,375 mg in sodium chloride 0.9 % 100 mL extended IVPB (mini-bag)  3,375 mg IntraVENous Q8H    insulin lispro (HUMALOG) injection vial 3 Units  3 Units SubCUTAneous TID WC    traMADol (ULTRAM) tablet 50 mg  50 mg Oral Q6H PRN    multivitamin 1 tablet  1 tablet Oral Daily    folic acid (FOLVITE) tablet 1 mg  1 mg Oral Daily    sennosides-docusate sodium (SENOKOT-S) 8.6-50 MG tablet 2 tablet  2 tablet Oral Daily    insulin glargine (LANTUS) injection vial 10 Units  10 Units SubCUTAneous Nightly    cloNIDine (CATAPRES) 0.1 MG/24HR 1 patch  1 patch TransDERmal Weekly    hydrALAZINE (APRESOLINE) injection 10 mg  10 mg IntraVENous Q6H PRN    carvedilol (COREG) tablet 3.125 mg  3.125 mg Oral BID WC    LORazepam (ATIVAN) tablet 1 mg  1 mg Oral Q1H PRN    Or    LORazepam (ATIVAN) injection 1 mg  1 mg IntraVENous Q1H PRN    Or    LORazepam (ATIVAN) tablet 2 mg  2 mg Oral Q1H PRN    Or    LORazepam (ATIVAN) injection 2 mg  2 mg IntraVENous Q1H PRN    Or    LORazepam (ATIVAN) tablet 3 mg  3 mg Oral Q1H PRN    Or    LORazepam (ATIVAN) injection 3 mg  3 mg IntraVENous Q1H PRN    Or    LORazepam (ATIVAN) tablet 4 mg  4 mg Oral Q1H PRN    Or    LORazepam (ATIVAN) injection 4 mg  4 mg IntraVENous Q1H PRN    ondansetron (ZOFRAN) injection 4 mg  4 mg IntraVENous Q6H PRN    heparin (porcine)
(ATIVAN) tablet 1 mg  1 mg Oral Q1H PRN    Or    LORazepam (ATIVAN) injection 1 mg  1 mg IntraVENous Q1H PRN    Or    LORazepam (ATIVAN) tablet 2 mg  2 mg Oral Q1H PRN    Or    LORazepam (ATIVAN) injection 2 mg  2 mg IntraVENous Q1H PRN    Or    LORazepam (ATIVAN) tablet 3 mg  3 mg Oral Q1H PRN    Or    LORazepam (ATIVAN) injection 3 mg  3 mg IntraVENous Q1H PRN    Or    LORazepam (ATIVAN) tablet 4 mg  4 mg Oral Q1H PRN    Or    LORazepam (ATIVAN) injection 4 mg  4 mg IntraVENous Q1H PRN    ondansetron (ZOFRAN) injection 4 mg  4 mg IntraVENous Q6H PRN    heparin (porcine) injection 5,000 Units  5,000 Units SubCUTAneous 3 times per day    sodium chloride flush 0.9 % injection 5-40 mL  5-40 mL IntraVENous PRN    polyethylene glycol (GLYCOLAX) packet 17 g  17 g Oral Daily PRN    acetaminophen (TYLENOL) tablet 650 mg  650 mg Oral Q6H PRN    Or    acetaminophen (TYLENOL) suppository 650 mg  650 mg Rectal Q6H PRN    glucose chewable tablet 16 g  4 tablet Oral PRN    dextrose bolus 10% 125 mL  125 mL IntraVENous PRN    Or    dextrose bolus 10% 250 mL  250 mL IntraVENous PRN    glucagon (rDNA) injection 1 mg  1 mg SubCUTAneous PRN    dextrose 10 % infusion   IntraVENous Continuous PRN    aluminum & magnesium hydroxide-simethicone (MAALOX) 200-200-20 MG/5ML suspension 30 mL  30 mL Oral Q6H PRN    pantoprazole (PROTONIX) injection 40 mg  40 mg IntraVENous Daily       Labs:    No results for input(s): WBC, HGB, HCT, PLT in the last 72 hours. Recent Labs     03/13/23  0902 03/14/23  0457 03/15/23  0423    136 135*   K 3.7 3.6 3.3*    104 103   CO2 21 26 27   BUN 15 13 9           Time spent on direct patient care >35 mints     Complexity : High complex - due to multiple medical issues outlined above.      CODE Status : Full edema    Case discussed with:  [x]Patient  [] Elly Dooley []Nursing  []Case Management         Disclaimer: Sections of this note are dictated utilizing voice recognition software,
1,000 mg in sodium chloride 0.9 % 100 mL IVPB (mini-bag)  1,000 mg IntraVENous Q8H    insulin lispro (HUMALOG) injection vial 0-8 Units  0-8 Units SubCUTAneous 4 times per day    ipratropium-albuterol (DUONEB) nebulizer solution 1 ampule  1 ampule Inhalation Q4H PRN    thiamine (B-1) 200 mg in sodium chloride 0.9 % 100 mL IVPB  200 mg IntraVENous Daily    metoclopramide (REGLAN) 10 MG/10ML solution 5 mg  5 mg Oral TID AC    traMADol (ULTRAM) tablet 50 mg  50 mg Oral Q6H PRN    multivitamin 1 tablet  1 tablet Oral Daily    folic acid (FOLVITE) tablet 1 mg  1 mg Oral Daily    sennosides-docusate sodium (SENOKOT-S) 8.6-50 MG tablet 2 tablet  2 tablet Oral Daily    [Held by provider] insulin glargine (LANTUS) injection vial 10 Units  10 Units SubCUTAneous Nightly    cloNIDine (CATAPRES) 0.1 MG/24HR 1 patch  1 patch TransDERmal Weekly    hydrALAZINE (APRESOLINE) injection 10 mg  10 mg IntraVENous Q6H PRN    carvedilol (COREG) tablet 3.125 mg  3.125 mg Oral BID WC    LORazepam (ATIVAN) tablet 1 mg  1 mg Oral Q1H PRN    Or    LORazepam (ATIVAN) injection 1 mg  1 mg IntraVENous Q1H PRN    Or    LORazepam (ATIVAN) tablet 2 mg  2 mg Oral Q1H PRN    Or    LORazepam (ATIVAN) injection 2 mg  2 mg IntraVENous Q1H PRN    Or    LORazepam (ATIVAN) tablet 3 mg  3 mg Oral Q1H PRN    Or    LORazepam (ATIVAN) injection 3 mg  3 mg IntraVENous Q1H PRN    Or    LORazepam (ATIVAN) tablet 4 mg  4 mg Oral Q1H PRN    Or    LORazepam (ATIVAN) injection 4 mg  4 mg IntraVENous Q1H PRN    ondansetron (ZOFRAN) injection 4 mg  4 mg IntraVENous Q6H PRN    heparin (porcine) injection 5,000 Units  5,000 Units SubCUTAneous 3 times per day    sodium chloride flush 0.9 % injection 5-40 mL  5-40 mL IntraVENous PRN    polyethylene glycol (GLYCOLAX) packet 17 g  17 g Oral Daily PRN    acetaminophen (TYLENOL) tablet 650 mg  650 mg Oral Q6H PRN    Or    acetaminophen (TYLENOL) suppository 650 mg  650 mg Rectal Q6H PRN    glucose chewable tablet 16 g  4 tablet
1.56* 1.43*  1.41* 1.39* 1.21   CALCIUM 8.5 8.0* 8.1* 7.4*  7.5* 7.1* 6.8*   LABALBU 3.6  --  2.9* 2.6*  2.6* 2.4* 2.3*   K 5.6* 6.2* 4.5 4.5  4.4 4.2 3.7   * 130* 135* 136  134* 134* 138    105 107 106  105 101 106   CO2 22 19* 24 24  24 30 27   PHOS  --   --  1.6* 3.0 2.3*  --         Images:    US GALLBLADDER RUQ    Result Date: 3/4/2023  No evidence of cholelithiasis, acute cholecystitis, or biliary ductal dilatation. Pancreas obscured by overlying bowel gas. XR CHEST PORTABLE    Result Date: 3/4/2023  Enteric tube in the stomach. Bibasilar atelectasis. EKG Results for orders placed or performed during the hospital encounter of 03/02/23   EKG 12 Lead   Result Value Ref Range    Ventricular Rate 107 BPM    Atrial Rate 107 BPM    P-R Interval 152 ms    QRS Duration 84 ms    Q-T Interval 310 ms    QTc Calculation (Bazett) 413 ms    P Axis 23 degrees    R Axis -20 degrees    T Axis 15 degrees    Diagnosis Sinus tachycardia         I have personally reviewed the old medical records and labs    Plan / Recommendation:      1.  Arf related to pancreatitis,acei,iv contrast.improving discussed with family  2.hyponatremia corrected  3.hypocalcemia,give iv calcium,check phos  4.htn,start clonidine patch    D/w Dr. Xin Villasenor MD  Nephrology  3/5/2023
2 mg  2 mg IntraVENous Q1H PRN    Or    LORazepam (ATIVAN) tablet 3 mg  3 mg Oral Q1H PRN    Or    LORazepam (ATIVAN) injection 3 mg  3 mg IntraVENous Q1H PRN    Or    LORazepam (ATIVAN) tablet 4 mg  4 mg Oral Q1H PRN    Or    LORazepam (ATIVAN) injection 4 mg  4 mg IntraVENous Q1H PRN    ondansetron (ZOFRAN) injection 4 mg  4 mg IntraVENous Q6H PRN    heparin (porcine) injection 5,000 Units  5,000 Units SubCUTAneous 3 times per day    sodium chloride flush 0.9 % injection 5-40 mL  5-40 mL IntraVENous PRN    polyethylene glycol (GLYCOLAX) packet 17 g  17 g Oral Daily PRN    acetaminophen (TYLENOL) tablet 650 mg  650 mg Oral Q6H PRN    Or    acetaminophen (TYLENOL) suppository 650 mg  650 mg Rectal Q6H PRN    glucose chewable tablet 16 g  4 tablet Oral PRN    dextrose bolus 10% 125 mL  125 mL IntraVENous PRN    Or    dextrose bolus 10% 250 mL  250 mL IntraVENous PRN    glucagon (rDNA) injection 1 mg  1 mg SubCUTAneous PRN    dextrose 10 % infusion   IntraVENous Continuous PRN    aluminum & magnesium hydroxide-simethicone (MAALOX) 200-200-20 MG/5ML suspension 30 mL  30 mL Oral Q6H PRN    pantoprazole (PROTONIX) injection 40 mg  40 mg IntraVENous Daily      Allergies   Allergen Reactions    Nifedipine Other (See Comments)     Facial redness    Sulfa Antibiotics Other (See Comments)     Unknown reaction        Review of Systems:  Pertinent items are noted in the History of Present Illness.     Objective:     BP (!) 127/55   Pulse 78   Temp 98.5 °F (36.9 °C) (Oral)   Resp 14   Ht 5' 6\" (1.676 m)   Wt 225 lb 1.4 oz (102.1 kg)   SpO2 96%   BMI 36.33 kg/m²     Physical Exam:      General:  in no apparent distress and alert                   Abdomen:   rounded and protuberant, soft, nontender               Imaging and Lab Review:     CBC:   Lab Results   Component Value Date/Time    WBC 6.6 03/20/2023 04:33 AM    RBC 2.70 03/20/2023 04:33 AM    HGB 7.2 03/20/2023 04:33 AM    HCT 21.9 03/20/2023 04:33 AM    
Advancement/Tolerance  Physical Signs/Symptoms Outcomes: Biochemical Data, GI Status, Fluid Status or Edema, Hemodynamic Status, Weight    Discharge Planning:     Too soon to determine     [facility] Em [facility] [address], [address] [place] [address], [address] [place] [address] Dr  Contact: [phone]
IntraVENous Daily    meropenem (MERREM) 1,000 mg in sodium chloride 0.9 % 100 mL IVPB (mini-bag)  1,000 mg IntraVENous Q8H    metoclopramide (REGLAN) 10 MG/10ML solution 5 mg  5 mg Oral TID AC    traMADol (ULTRAM) tablet 50 mg  50 mg Oral Q6H PRN    multivitamin 1 tablet  1 tablet Oral Daily    folic acid (FOLVITE) tablet 1 mg  1 mg Oral Daily    sennosides-docusate sodium (SENOKOT-S) 8.6-50 MG tablet 2 tablet  2 tablet Oral Daily    [Held by provider] insulin glargine (LANTUS) injection vial 10 Units  10 Units SubCUTAneous Nightly    cloNIDine (CATAPRES) 0.1 MG/24HR 1 patch  1 patch TransDERmal Weekly    hydrALAZINE (APRESOLINE) injection 10 mg  10 mg IntraVENous Q6H PRN    carvedilol (COREG) tablet 3.125 mg  3.125 mg Oral BID WC    LORazepam (ATIVAN) tablet 1 mg  1 mg Oral Q1H PRN    Or    LORazepam (ATIVAN) injection 1 mg  1 mg IntraVENous Q1H PRN    Or    LORazepam (ATIVAN) tablet 2 mg  2 mg Oral Q1H PRN    Or    LORazepam (ATIVAN) injection 2 mg  2 mg IntraVENous Q1H PRN    Or    LORazepam (ATIVAN) tablet 3 mg  3 mg Oral Q1H PRN    Or    LORazepam (ATIVAN) injection 3 mg  3 mg IntraVENous Q1H PRN    Or    LORazepam (ATIVAN) tablet 4 mg  4 mg Oral Q1H PRN    Or    LORazepam (ATIVAN) injection 4 mg  4 mg IntraVENous Q1H PRN    ondansetron (ZOFRAN) injection 4 mg  4 mg IntraVENous Q6H PRN    heparin (porcine) injection 5,000 Units  5,000 Units SubCUTAneous 3 times per day    sodium chloride flush 0.9 % injection 5-40 mL  5-40 mL IntraVENous PRN    polyethylene glycol (GLYCOLAX) packet 17 g  17 g Oral Daily PRN    acetaminophen (TYLENOL) tablet 650 mg  650 mg Oral Q6H PRN    Or    acetaminophen (TYLENOL) suppository 650 mg  650 mg Rectal Q6H PRN    glucose chewable tablet 16 g  4 tablet Oral PRN    dextrose bolus 10% 125 mL  125 mL IntraVENous PRN    Or    dextrose bolus 10% 250 mL  250 mL IntraVENous PRN    glucagon (rDNA) injection 1 mg  1 mg SubCUTAneous PRN    dextrose 10 % infusion   IntraVENous Continuous PRN
determine     [facility] Boone [facility] [address], [address] [place] [address], [address] [place]   Contact: [phone]
100 - 111 mmol/L    CO2 24 21 - 32 mmol/L    Anion Gap 4 3.0 - 18 mmol/L    Glucose 231 (H) 74 - 99 mg/dL    BUN 47 (H) 7.0 - 18 MG/DL    Creatinine 1.56 (H) 0.6 - 1.3 MG/DL    Bun/Cre Ratio 30 (H) 12 - 20      Est, Glom Filt Rate 48 (L) >60 ml/min/1.73m2    Calcium 8.1 (L) 8.5 - 10.1 MG/DL    Total Bilirubin 1.5 (H) 0.2 - 1.0 MG/DL    ALT 24 16 - 61 U/L    AST 68 (H) 10 - 38 U/L    Alk Phosphatase 43 (L) 45 - 117 U/L    Total Protein 6.2 (L) 6.4 - 8.2 g/dL    Albumin 2.9 (L) 3.4 - 5.0 g/dL    Globulin 3.3 2.0 - 4.0 g/dL    Albumin/Globulin Ratio 0.9 0.8 - 1.7     Magnesium    Collection Time: 03/03/23 10:00 PM   Result Value Ref Range    Magnesium 2.0 1.6 - 2.6 mg/dL   Phosphorus    Collection Time: 03/03/23 10:00 PM   Result Value Ref Range    Phosphorus 1.6 (L) 2.5 - 4.9 MG/DL   Calcium, Ionized    Collection Time: 03/03/23 10:00 PM   Result Value Ref Range    IONIZED CALCIUM 1.05 (L) 1.15 - 1.33 MMOL/L   Renal Function Panel    Collection Time: 03/04/23  5:40 AM   Result Value Ref Range    Sodium 134 (L) 136 - 145 mmol/L    Potassium 4.4 3.5 - 5.5 mmol/L    Chloride 105 100 - 111 mmol/L    CO2 24 21 - 32 mmol/L    Anion Gap 5 3.0 - 18 mmol/L    Glucose 313 (H) 74 - 99 mg/dL    BUN 42 (H) 7.0 - 18 MG/DL    Creatinine 1.41 (H) 0.6 - 1.3 MG/DL    Bun/Cre Ratio 30 (H) 12 - 20      Est, Glom Filt Rate 54 (L) >60 ml/min/1.73m2    Calcium 7.5 (L) 8.5 - 10.1 MG/DL    Phosphorus 3.0 2.5 - 4.9 MG/DL    Albumin 2.6 (L) 3.4 - 5.0 g/dL   CBC with Auto Differential    Collection Time: 03/04/23  5:40 AM   Result Value Ref Range    WBC 7.6 4.6 - 13.2 K/uL    RBC 3.94 (L) 4.35 - 5.65 M/uL    Hemoglobin 11.0 (L) 13.0 - 16.0 g/dL    Hematocrit 32.1 (L) 36.0 - 48.0 %    MCV 81.5 78.0 - 100.0 FL    MCH 27.9 24.0 - 34.0 PG    MCHC 34.3 31.0 - 37.0 g/dL    RDW 15.1 (H) 11.6 - 14.5 %    Platelets 950 (L) 268 - 420 K/uL    MPV 9.7 9.2 - 11.8 FL    Nucleated RBCs 0.0 0  WBC    nRBC 0.00 0.00 - 0.01 K/uL    Seg Neutrophils 60 40 -
IntraVENous Q1H PRN    ondansetron (ZOFRAN) injection 4 mg  4 mg IntraVENous Q6H PRN    heparin (porcine) injection 5,000 Units  5,000 Units SubCUTAneous 3 times per day    sodium chloride flush 0.9 % injection 5-40 mL  5-40 mL IntraVENous PRN    polyethylene glycol (GLYCOLAX) packet 17 g  17 g Oral Daily PRN    acetaminophen (TYLENOL) tablet 650 mg  650 mg Oral Q6H PRN    Or    acetaminophen (TYLENOL) suppository 650 mg  650 mg Rectal Q6H PRN    glucose chewable tablet 16 g  4 tablet Oral PRN    dextrose bolus 10% 125 mL  125 mL IntraVENous PRN    Or    dextrose bolus 10% 250 mL  250 mL IntraVENous PRN    glucagon (rDNA) injection 1 mg  1 mg SubCUTAneous PRN    dextrose 10 % infusion   IntraVENous Continuous PRN    aluminum & magnesium hydroxide-simethicone (MAALOX) 200-200-20 MG/5ML suspension 30 mL  30 mL Oral Q6H PRN       Labs:    Recent Labs     03/20/23  0433 03/21/23  0425   WBC 6.6 6.5   HGB 7.2* 6.8*   HCT 21.9* 20.5*    327       Recent Labs     03/19/23  0402 03/19/23  1356 03/20/23  0433 03/21/23  0425   *  --  134* 134*   K 3.3*  --  3.7 3.7     --  103 103   CO2 25  --  25 27   BUN 28*  --  31* 30*   MG 2.0  --  1.9 1.9   PHOS 2.6  --  1.5* 2.0*   ALT  --  22 30 40           Time spent on direct patient care >35 mints     Complexity : High complex - due to multiple medical issues outlined above. CODE Status : Full edema    Case discussed with:  [x]Patient  [] Keira [place] []Nursing  []Case Management         Disclaimer: Sections of this note are dictated utilizing voice recognition software, which may have resulted in some phonetic based errors in grammar and contents. Even though attempts were made to correct all the mistakes, some may have been missed, and remained in the body of the document. If questions arise, please contact our department.     Signed By: Regina Javier MD     March 21, 2023
MCHC 32.1 31.0 - 37.0 g/dL    RDW 15.4 (H) 11.6 - 14.5 %    Platelets 286 375 - 701 K/uL    MPV 9.6 9.2 - 11.8 FL    Nucleated RBCs 0.0 0  WBC    nRBC 0.00 0.00 - 0.01 K/uL    Seg Neutrophils 89 (H) 40 - 73 %    Band Neutrophils 3 %    Lymphocytes 3 (L) 21 - 52 %    Monocytes 3 3 - 10 %    Eosinophils % 0 0 - 5 %    Basophils 0 0 - 2 %    Metamyelocytes 1 %    Myelocytes 1 %    Immature Granulocytes 0 0.0 - 0.5 %    Segs Absolute 8.6 (H) 1.8 - 8.0 K/UL    Absolute Lymph # 0.3 (L) 0.9 - 3.6 K/UL    Absolute Mono # 0.3 0.05 - 1.2 K/UL    Absolute Eos # 0.0 0.0 - 0.4 K/UL    Basophils Absolute 0.0 0.0 - 0.1 K/UL    Absolute Immature Granulocyte 0.0 0.00 - 0.04 K/UL    Differential Type MANUAL      Platelet Comment ADEQUATE PLATELETS      RBC Comment ANISOCYTOSIS  1+        RBC Comment POLYCHROMASIA  1+        WBC Comment SMUDGE CELLS SEEN     Comprehensive Metabolic Panel    Collection Time: 03/06/23  5:12 AM   Result Value Ref Range    Sodium 139 136 - 145 mmol/L    Potassium 3.8 3.5 - 5.5 mmol/L    Chloride 106 100 - 111 mmol/L    CO2 26 21 - 32 mmol/L    Anion Gap 7 3.0 - 18 mmol/L    Glucose 174 (H) 74 - 99 mg/dL    BUN 26 (H) 7.0 - 18 MG/DL    Creatinine 1.11 0.6 - 1.3 MG/DL    Bun/Cre Ratio 23 (H) 12 - 20      Est, Glom Filt Rate >60 >60 ml/min/1.73m2    Calcium 7.5 (L) 8.5 - 10.1 MG/DL    Total Bilirubin 1.9 (H) 0.2 - 1.0 MG/DL    ALT 22 16 - 61 U/L    AST 42 (H) 10 - 38 U/L    Alk Phosphatase 47 45 - 117 U/L    Total Protein 6.0 (L) 6.4 - 8.2 g/dL    Albumin 2.5 (L) 3.4 - 5.0 g/dL    Globulin 3.5 2.0 - 4.0 g/dL    Albumin/Globulin Ratio 0.7 (L) 0.8 - 1.7     POCT Glucose    Collection Time: 03/06/23  8:13 AM   Result Value Ref Range    POC Glucose 179 (H) 70 - 110 mg/dL   POCT Glucose    Collection Time: 03/06/23 12:25 PM   Result Value Ref Range    POC Glucose 205 (H) 70 - 110 mg/dL       Signed By: Rimma Sprague DO     March 6, 2023      Disclaimer: Sections of this note are dictated using utilizing
[[id number]] Collected: 03/13/23 1300    Order Status: Canceled Specimen: Body Fluid     Respiratory Panel, Molecular, with COVID-19 (Restricted: peds pts or suitable admitted adults) [[id number]] Collected: 03/11/23 0420    Order Status: Completed Specimen: Nasopharyngeal Updated: 03/11/23 0601     Adenovirus by PCR Not detected        Coronavirus 229E by PCR Not detected        Coronavirus HKU1 by PCR Not detected        Coronavirus NL63 by PCR Not detected        Coronavirus OC43 by PCR Not detected        SARS-CoV-2, PCR Not detected        Human Metapneumovirus by PCR Not detected        Rhinovirus Enterovirus PCR Not detected        Influenza A by PCR Not detected        Influenza B PCR Not detected        Parainfluenza 1 PCR Not detected        Parainfluenza 2 PCR Not detected        Parainfluenza 3 PCR Not detected        Parainfluenza 4 PCR Not detected        Respiratory Syncytial Virus by PCR Not detected        Bordetella parapertussis by PCR Not detected        Bordetella pertussis by PCR Not detected        Chlamydophila Pneumonia PCR Not detected        Mycoplasma pneumo by PCR Not detected       Culture, MRSA, Screening [[id number]] Collected: 03/10/23 0615    Order Status: Completed Specimen: Nares Updated: 03/11/23 1608     Special Requests NO SPECIAL REQUESTS        Culture MRSA NOT PRESENT               Screening of patient nares for MRSA is for surveillance purposes and, if positive, to facilitate isolation considerations in high risk settings. It is not intended for automatic decolonization interventions per se as regimens are not sufficiently effective to warrant routine use.       Culture, Respiratory [[id number]]  (Abnormal)  (Susceptibility) Collected: 03/10/23 0610    Order Status: Completed Specimen: Sputum Expectorated Updated: 03/14/23 0922     Special Requests NO SPECIAL REQUESTS        Gram stain RARE WBCS SEEN               1+ Gram positive cocci IN CLUSTERS            RARE GRAM
cloNIDine (CATAPRES) 0.1 MG/24HR 1 patch  1 patch TransDERmal Weekly    hydrALAZINE (APRESOLINE) injection 10 mg  10 mg IntraVENous Q6H PRN    carvedilol (COREG) tablet 3.125 mg  3.125 mg Oral BID WC    LORazepam (ATIVAN) tablet 1 mg  1 mg Oral Q1H PRN    Or    LORazepam (ATIVAN) injection 1 mg  1 mg IntraVENous Q1H PRN    Or    LORazepam (ATIVAN) tablet 2 mg  2 mg Oral Q1H PRN    Or    LORazepam (ATIVAN) injection 2 mg  2 mg IntraVENous Q1H PRN    Or    LORazepam (ATIVAN) tablet 3 mg  3 mg Oral Q1H PRN    Or    LORazepam (ATIVAN) injection 3 mg  3 mg IntraVENous Q1H PRN    Or    LORazepam (ATIVAN) tablet 4 mg  4 mg Oral Q1H PRN    Or    LORazepam (ATIVAN) injection 4 mg  4 mg IntraVENous Q1H PRN    ondansetron (ZOFRAN) injection 4 mg  4 mg IntraVENous Q6H PRN    heparin (porcine) injection 5,000 Units  5,000 Units SubCUTAneous 3 times per day    sodium chloride flush 0.9 % injection 5-40 mL  5-40 mL IntraVENous 2 times per day    sodium chloride flush 0.9 % injection 5-40 mL  5-40 mL IntraVENous PRN    polyethylene glycol (GLYCOLAX) packet 17 g  17 g Oral Daily PRN    acetaminophen (TYLENOL) tablet 650 mg  650 mg Oral Q6H PRN    Or    acetaminophen (TYLENOL) suppository 650 mg  650 mg Rectal Q6H PRN    glucose chewable tablet 16 g  4 tablet Oral PRN    dextrose bolus 10% 125 mL  125 mL IntraVENous PRN    Or    dextrose bolus 10% 250 mL  250 mL IntraVENous PRN    glucagon (rDNA) injection 1 mg  1 mg SubCUTAneous PRN    dextrose 10 % infusion   IntraVENous Continuous PRN    aluminum & magnesium hydroxide-simethicone (MAALOX) 200-200-20 MG/5ML suspension 30 mL  30 mL Oral Q6H PRN    pantoprazole (PROTONIX) injection 40 mg  40 mg IntraVENous Daily       Labs:    Recent Labs     03/09/23  0507 03/10/23  0452   WBC 9.0 9.3   HGB 8.5* 8.1*   HCT 26.4* 24.8*    192     Recent Labs     03/09/23  0507 03/09/23  1500 03/10/23  0452 03/11/23  0501   * 136 134* 136   K 3.4* 3.5 3.3* 2.9*    103 103 105   CO2
mL  30 mL Oral Q6H PRN    pantoprazole (PROTONIX) injection 40 mg  40 mg IntraVENous Daily       Labs:    Recent Labs     03/10/23  0452 03/12/23  0453   WBC 9.3 7.4   HGB 8.1* 7.5*   HCT 24.8* 23.5*    228     Recent Labs     03/10/23  0452 03/11/23  0501 03/11/23  1610 03/12/23  0453   * 136  --  137   K 3.3* 2.9* 3.8 3.5    105  --  105   CO2 26 25  --  24   BUN 16 14  --  13   MG  --   --  1.7  --    ALT 16  --   --  18         Time spent on direct patient care >35 mints     Complexity : High complex - due to multiple medical issues outlined above. CODE Status : Full edema    Case discussed with:  [x]Patient  [x] Keira [place]inas []Nursing  []Case Management         Disclaimer: Sections of this note are dictated utilizing voice recognition software, which may have resulted in some phonetic based errors in grammar and contents. Even though attempts were made to correct all the mistakes, some may have been missed, and remained in the body of the document. If questions arise, please contact our department.     Signed By: Ernie Sharp MD     March 12, 2023
   March 7, 2023      Disclaimer: Sections of this note are dictated using utilizing voice recognition software.  Minor typographical errors may be present. If questions arise, please do not hesitate to contact me or call our department.      
affect         Labs: Results:   Chemistry Recent Labs     03/14/23  0457 03/15/23  0423 03/16/23  0353   GLUCOSE 332* 208* 215*    135* 133*   K 3.6 3.3* 3.3*    103 101   CO2 26 27 25   BUN 13 9 10   CREATININE 1.07 1.01 1.22        CBC w/Diff Recent Labs     03/16/23  0353   WBC 7.9   RBC 2.95*   HGB 8.0*   HCT 24.0*           Microbiology Results       Procedure Component Value Units Date/Time    Culture, Body Fluid [[id number]] Collected: 03/13/23 1300    Order Status: Canceled Specimen: Body Fluid     Respiratory Panel, Molecular, with COVID-19 (Restricted: peds pts or suitable admitted adults) [[id number]] Collected: 03/11/23 0420    Order Status: Completed Specimen: Nasopharyngeal Updated: 03/11/23 0601     Adenovirus by PCR Not detected        Coronavirus 229E by PCR Not detected        Coronavirus HKU1 by PCR Not detected        Coronavirus NL63 by PCR Not detected        Coronavirus OC43 by PCR Not detected        SARS-CoV-2, PCR Not detected        Human Metapneumovirus by PCR Not detected        Rhinovirus Enterovirus PCR Not detected        Influenza A by PCR Not detected        Influenza B PCR Not detected        Parainfluenza 1 PCR Not detected        Parainfluenza 2 PCR Not detected        Parainfluenza 3 PCR Not detected        Parainfluenza 4 PCR Not detected        Respiratory Syncytial Virus by PCR Not detected        Bordetella parapertussis by PCR Not detected        Bordetella pertussis by PCR Not detected        Chlamydophila Pneumonia PCR Not detected        Mycoplasma pneumo by PCR Not detected       Culture, MRSA, Screening [[id number]] Collected: 03/10/23 0615    Order Status: Completed Specimen: Nares Updated: 03/11/23 1608     Special Requests NO SPECIAL REQUESTS        Culture MRSA NOT PRESENT               Screening of patient nares for MRSA is for surveillance purposes and, if positive, to facilitate isolation considerations in high risk settings.  It is not
concern of infected necrotic pancreatitis then an aspiration should be done before embarking into pancreatic necrosectomy because of the extremely high mortality.      Plan:     Continue on only full liquid diet and do not advance for now based on the clinical picture  Continue with antibiotics per ID recommendation  This process may take weeks to months to improve  Abstain from alcohol intake    Please call me if you have any questions (cell phone: [phone])     Signed By: Michael Fernandes MD     March 15, 2023
department.     Signed By: Mary Ann Viera MD     March 13, 2023
errors may be present. If questions arise, please do not hesitate to contact me or call our department.
surveillance purposes and, if positive, to facilitate isolation considerations in high risk settings. It is not intended for automatic decolonization interventions per se as regimens are not sufficiently effective to warrant routine use.       Culture, Respiratory [[id number]]  (Abnormal)  (Susceptibility) Collected: 03/10/23 0610    Order Status: Completed Specimen: Sputum Expectorated Updated: 03/14/23 7361     Special Requests NO SPECIAL REQUESTS        Gram stain RARE WBCS SEEN               1+ Gram positive cocci IN CLUSTERS            RARE GRAM POSITIVE RODS        Culture       LIGHT Klebsiella pneumoniae                  LIGHT Staphylococcus aureus                  MODERATE Yeast, (apparent Candida albicans/Candida dubliniensis)                  MODERATE NORMAL RESPIRATORY SAJI          Susceptibility        Klebsiella pneumoniae      BACTERIAL SUSCEPTIBILITY PANEL SHELLY      amikacin <=2 ug/mL Sensitive      ampicillin >=32 ug/mL Resistant      ampicillin-sulbactam 4 ug/mL Sensitive      ceFAZolin <=4 ug/mL Sensitive      cefepime <=1 ug/mL Sensitive      cefOXitin <=4 ug/mL Sensitive      cefTAZidime <=1 ug/mL Sensitive      cefTRIAXone <=1 ug/mL Sensitive      ciprofloxacin <=0.25 ug/mL Sensitive      gentamicin <=1 ug/mL Sensitive      levofloxacin <=0.12 ug/mL Sensitive      meropenem <=0.25 ug/mL Sensitive      piperacillin-tazobactam <=4 ug/mL Sensitive      tobramycin <=1 ug/mL Sensitive      trimethoprim-sulfamethoxazole <=20 ug/mL Sensitive                       Susceptibility        Staphylococcus aureus      BACTERIAL SUSCEPTIBILITY PANEL SHELLY      ciprofloxacin <=0.5 ug/mL Sensitive      clindamycin 0.25 ug/mL Sensitive      doxycycline <=0.5 ug/mL Sensitive      erythromycin 0.5 ug/mL Sensitive      gentamicin <=0.5 ug/mL Sensitive      levofloxacin 0.25 ug/mL Sensitive      linezolid 2 ug/mL Sensitive      moxifloxacin <=0.25 ug/mL Sensitive      oxacillin <=0.25 ug/mL Sensitive      tetracycline
[facility]  Phone: [phone]  Pager: [phone]
NO SPECIAL REQUESTS        Culture NO GROWTH 5 DAYS       Culture, Blood 2 [[id number]] Collected: 03/09/23 2211    Order Status: Completed Specimen: Blood Updated: 03/14/23 0652     Special Requests NO SPECIAL REQUESTS        Culture NO GROWTH 5 DAYS                   RADIOLOGY:    All available imaging studies/reports in [facility] for this admission were reviewed      Disclaimer: Sections of this note are dictated utilizing voice recognition software, which may have resulted in some phonetic based errors in grammar and contents. Even though attempts were made to correct all the mistakes, some may have been missed, and remained in the body of the document. If questions arise, please contact our department.     Dr. Sekou Christie, Infectious Disease Specialist  [phone]  March 14, 2023  8:07 AM
moxifloxacin <=0.25 ug/mL Sensitive      oxacillin <=0.25 ug/mL Sensitive      tetracycline <=1 ug/mL Sensitive      trimethoprim-sulfamethoxazole <=10 ug/mL Sensitive      vancomycin 1 ug/mL Sensitive                           Culture, Blood 1 [[id number]] Collected: 03/09/23 2221    Order Status: Completed Specimen: Blood Updated: 03/15/23 0648     Special Requests NO SPECIAL REQUESTS        Culture NO GROWTH 6 DAYS       Culture, Blood 2 [[id number]] Collected: 03/09/23 2211    Order Status: Completed Specimen: Blood Updated: 03/15/23 0648     Special Requests NO SPECIAL REQUESTS        Culture NO GROWTH 6 DAYS                   RADIOLOGY:    All available imaging studies/reports in [facility] [place] for this admission were reviewed          Disclaimer: Sections of this note are dictated utilizing voice recognition software, which may have resulted in some phonetic based errors in grammar and contents. Even though attempts were made to correct all the mistakes, some may have been missed, and remained in the body of the document. If questions arise, please contact our department.     Dr. Lydia Benton, Infectious Disease Specialist  [phone]  March 21, 2023  8:02 AM

## 2023-05-16 ENCOUNTER — TELEPHONE (OUTPATIENT)
Age: 68
End: 2023-05-16

## 2023-05-16 NOTE — TELEPHONE ENCOUNTER
Patient trans from ecc to schedule hosp follow up appt, pt is still currently admitted, will contact office when discharged to schedule appt

## 2023-05-22 NOTE — PROGRESS NOTES
1. \"Have you been to the ER, urgent care clinic since your last visit? Hospitalized since your last visit? \" Yes When: 3/22/23  Where: [facility]  Reason for visit: for necrotizing pancreatitis    2. \"Have you seen or consulted any other health care providers outside of the [address] [place] [address] since your last visit? \" No     3. For patients aged 39-70: Has the patient had a colonoscopy / FIT/ Cologuard? Yes - Care Gap present.  Most recent result on file
LABVLDL 21 12/09/2019    LABVLDL 18 08/08/2019    VLDL 12 11/08/2022    VLDL 20 05/02/2022    VLDL 11 04/28/2021     Lab Results   Component Value Date    TAYLOR 2.5 03/02/2023         Physical Exam  Constitutional:       General: He is not in acute distress. Appearance: Normal appearance. HENT:      Head: Normocephalic and atraumatic. Pulmonary:      Effort: Pulmonary effort is normal.   Skin:     General: Skin is warm and dry. Findings: No erythema or rash. Comments: Biliary drain CDI  Pancreatic IR drain- CDI   Neurological:      General: No focal deficit present. Mental Status: He is alert and oriented to person, place, and time. Psychiatric:         Mood and Affect: Mood normal.         Behavior: Behavior normal.         Thought Content: Thought content normal.         Judgment: Judgment normal.           An electronic signature was used to authenticate this note.     --SHIKHA Hazel - NP on 5/23/2023 at 1:39 PM

## 2023-05-23 ENCOUNTER — OFFICE VISIT (OUTPATIENT)
Age: 68
End: 2023-05-23
Payer: MEDICARE

## 2023-05-23 VITALS
TEMPERATURE: 99.7 F | BODY MASS INDEX: 29.12 KG/M2 | OXYGEN SATURATION: 99 % | DIASTOLIC BLOOD PRESSURE: 60 MMHG | WEIGHT: 181.2 LBS | HEART RATE: 86 BPM | HEIGHT: 66 IN | SYSTOLIC BLOOD PRESSURE: 140 MMHG | RESPIRATION RATE: 20 BRPM

## 2023-05-23 DIAGNOSIS — Z09 HOSPITAL DISCHARGE FOLLOW-UP: Primary | ICD-10-CM

## 2023-05-23 DIAGNOSIS — E11.9 TYPE 2 DIABETES MELLITUS TREATED WITH INSULIN (HCC): ICD-10-CM

## 2023-05-23 DIAGNOSIS — Z79.4 TYPE 2 DIABETES MELLITUS TREATED WITH INSULIN (HCC): ICD-10-CM

## 2023-05-23 DIAGNOSIS — E16.0 HYPOGLYCEMIA DUE TO INSULIN: ICD-10-CM

## 2023-05-23 DIAGNOSIS — T38.3X5A HYPOGLYCEMIA DUE TO INSULIN: ICD-10-CM

## 2023-05-23 PROCEDURE — 3017F COLORECTAL CA SCREEN DOC REV: CPT | Performed by: NURSE PRACTITIONER

## 2023-05-23 PROCEDURE — 2022F DILAT RTA XM EVC RTNOPTHY: CPT | Performed by: NURSE PRACTITIONER

## 2023-05-23 PROCEDURE — 3044F HG A1C LEVEL LT 7.0%: CPT | Performed by: NURSE PRACTITIONER

## 2023-05-23 PROCEDURE — 99214 OFFICE O/P EST MOD 30 MIN: CPT | Performed by: NURSE PRACTITIONER

## 2023-05-23 PROCEDURE — 1123F ACP DISCUSS/DSCN MKR DOCD: CPT | Performed by: NURSE PRACTITIONER

## 2023-05-23 PROCEDURE — G8419 CALC BMI OUT NRM PARAM NOF/U: HCPCS | Performed by: NURSE PRACTITIONER

## 2023-05-23 PROCEDURE — G8427 DOCREV CUR MEDS BY ELIG CLIN: HCPCS | Performed by: NURSE PRACTITIONER

## 2023-05-23 PROCEDURE — 3077F SYST BP >= 140 MM HG: CPT | Performed by: NURSE PRACTITIONER

## 2023-05-23 PROCEDURE — 3078F DIAST BP <80 MM HG: CPT | Performed by: NURSE PRACTITIONER

## 2023-05-23 PROCEDURE — 1036F TOBACCO NON-USER: CPT | Performed by: NURSE PRACTITIONER

## 2023-05-23 RX ORDER — PEN NEEDLE, DIABETIC 32GX 5/32"
NEEDLE, DISPOSABLE MISCELLANEOUS
COMMUNITY
Start: 2023-05-16

## 2023-05-23 RX ORDER — INSULIN GLARGINE 100 [IU]/ML
INJECTION, SOLUTION SUBCUTANEOUS
COMMUNITY
Start: 2023-05-16 | End: 2023-05-23 | Stop reason: ALTCHOICE

## 2023-05-23 RX ORDER — INSULIN ASPART 100 [IU]/ML
INJECTION, SOLUTION INTRAVENOUS; SUBCUTANEOUS
COMMUNITY
Start: 2023-05-16 | End: 2023-05-23 | Stop reason: ALTCHOICE

## 2023-05-23 RX ORDER — GLUCOSAM/CHON-MSM1/C/MANG/BOSW 500-416.6
TABLET ORAL
COMMUNITY
Start: 2023-05-16

## 2023-05-23 RX ORDER — FLUTICASONE PROPIONATE 50 MCG
1 SPRAY, SUSPENSION (ML) NASAL DAILY
COMMUNITY

## 2023-05-23 SDOH — ECONOMIC STABILITY: INCOME INSECURITY: HOW HARD IS IT FOR YOU TO PAY FOR THE VERY BASICS LIKE FOOD, HOUSING, MEDICAL CARE, AND HEATING?: NOT HARD AT ALL

## 2023-05-23 SDOH — ECONOMIC STABILITY: FOOD INSECURITY: WITHIN THE PAST 12 MONTHS, YOU WORRIED THAT YOUR FOOD WOULD RUN OUT BEFORE YOU GOT MONEY TO BUY MORE.: NEVER TRUE

## 2023-05-23 SDOH — ECONOMIC STABILITY: FOOD INSECURITY: WITHIN THE PAST 12 MONTHS, THE FOOD YOU BOUGHT JUST DIDN'T LAST AND YOU DIDN'T HAVE MONEY TO GET MORE.: PATIENT DECLINED

## 2023-05-23 SDOH — ECONOMIC STABILITY: HOUSING INSECURITY
IN THE LAST 12 MONTHS, WAS THERE A TIME WHEN YOU DID NOT HAVE A STEADY PLACE TO SLEEP OR SLEPT IN A SHELTER (INCLUDING NOW)?: NO

## 2023-05-23 ASSESSMENT — PATIENT HEALTH QUESTIONNAIRE - PHQ9
1. LITTLE INTEREST OR PLEASURE IN DOING THINGS: 0
SUM OF ALL RESPONSES TO PHQ9 QUESTIONS 1 & 2: 0
2. FEELING DOWN, DEPRESSED OR HOPELESS: 0
SUM OF ALL RESPONSES TO PHQ QUESTIONS 1-9: 0

## 2023-05-23 ASSESSMENT — ENCOUNTER SYMPTOMS
SHORTNESS OF BREATH: 0
NAUSEA: 0
CHEST TIGHTNESS: 0
COUGH: 0
ABDOMINAL PAIN: 0
VOMITING: 0

## 2023-05-23 NOTE — PATIENT INSTRUCTIONS
Can increase Omeprazole to 20 mg bid prn    Monitor home blood pressure and blood sugars- he is under the care of [facility] currently and will discuss with them the mid morning lows, will order Free Style Ming 2 for continuous monitoring and safety

## 2023-06-06 NOTE — TELEPHONE ENCOUNTER
Patients wife called for a status update on patient's prescription. Her callback number is [phone] or [phone]. Please advise.

## 2023-06-09 NOTE — TELEPHONE ENCOUNTER
Patient came into office to check status of prescription request for medication that was issued while in the hospital and patient forgot the request at 5/23 appt and came back into office to request on 6/2, states currently has only has enough needles for one day

## 2023-06-10 RX ORDER — PEN NEEDLE, DIABETIC 32GX 5/32"
NEEDLE, DISPOSABLE MISCELLANEOUS
Qty: 100 EACH | Refills: 2 | Status: SHIPPED | OUTPATIENT
Start: 2023-06-10

## 2023-06-10 RX ORDER — GLUCOSAM/CHON-MSM1/C/MANG/BOSW 500-416.6
TABLET ORAL
Qty: 100 EACH | Refills: 2 | Status: SHIPPED | OUTPATIENT
Start: 2023-06-10

## 2023-08-15 ENCOUNTER — OFFICE VISIT (OUTPATIENT)
Age: 68
End: 2023-08-15
Payer: MEDICARE

## 2023-08-15 VITALS
OXYGEN SATURATION: 97 % | WEIGHT: 160.2 LBS | BODY MASS INDEX: 25.75 KG/M2 | SYSTOLIC BLOOD PRESSURE: 110 MMHG | TEMPERATURE: 97.5 F | DIASTOLIC BLOOD PRESSURE: 66 MMHG | HEIGHT: 66 IN | HEART RATE: 86 BPM | RESPIRATION RATE: 16 BRPM

## 2023-08-15 DIAGNOSIS — E11.9 TYPE 2 DIABETES MELLITUS TREATED WITH INSULIN (HCC): Primary | ICD-10-CM

## 2023-08-15 DIAGNOSIS — R53.1 GENERALIZED WEAKNESS: ICD-10-CM

## 2023-08-15 DIAGNOSIS — K85.91 NECROTIZING PANCREATITIS: ICD-10-CM

## 2023-08-15 DIAGNOSIS — Z79.4 TYPE 2 DIABETES MELLITUS TREATED WITH INSULIN (HCC): Primary | ICD-10-CM

## 2023-08-15 PROCEDURE — 3044F HG A1C LEVEL LT 7.0%: CPT | Performed by: FAMILY MEDICINE

## 2023-08-15 PROCEDURE — 3017F COLORECTAL CA SCREEN DOC REV: CPT | Performed by: FAMILY MEDICINE

## 2023-08-15 PROCEDURE — 1036F TOBACCO NON-USER: CPT | Performed by: FAMILY MEDICINE

## 2023-08-15 PROCEDURE — 2022F DILAT RTA XM EVC RTNOPTHY: CPT | Performed by: FAMILY MEDICINE

## 2023-08-15 PROCEDURE — 99214 OFFICE O/P EST MOD 30 MIN: CPT | Performed by: FAMILY MEDICINE

## 2023-08-15 PROCEDURE — 3078F DIAST BP <80 MM HG: CPT | Performed by: FAMILY MEDICINE

## 2023-08-15 PROCEDURE — G8427 DOCREV CUR MEDS BY ELIG CLIN: HCPCS | Performed by: FAMILY MEDICINE

## 2023-08-15 PROCEDURE — G8419 CALC BMI OUT NRM PARAM NOF/U: HCPCS | Performed by: FAMILY MEDICINE

## 2023-08-15 PROCEDURE — 1123F ACP DISCUSS/DSCN MKR DOCD: CPT | Performed by: FAMILY MEDICINE

## 2023-08-15 PROCEDURE — 3074F SYST BP LT 130 MM HG: CPT | Performed by: FAMILY MEDICINE

## 2023-08-15 RX ORDER — BLOOD SUGAR DIAGNOSTIC
1 STRIP MISCELLANEOUS DAILY
Qty: 100 EACH | Refills: 3 | Status: SHIPPED | OUTPATIENT
Start: 2023-08-15

## 2023-08-15 RX ORDER — SODIUM CHLORIDE 0.9 % (FLUSH) 0.9 %
SYRINGE (ML) INJECTION
COMMUNITY
Start: 2023-07-25

## 2023-08-15 RX ORDER — GLUCOSAM/CHON-MSM1/C/MANG/BOSW 500-416.6
1 TABLET ORAL DAILY
Qty: 100 EACH | Refills: 3 | Status: SHIPPED | OUTPATIENT
Start: 2023-08-15

## 2023-08-15 ASSESSMENT — PATIENT HEALTH QUESTIONNAIRE - PHQ9
SUM OF ALL RESPONSES TO PHQ QUESTIONS 1-9: 0
SUM OF ALL RESPONSES TO PHQ9 QUESTIONS 1 & 2: 0
SUM OF ALL RESPONSES TO PHQ QUESTIONS 1-9: 0
1. LITTLE INTEREST OR PLEASURE IN DOING THINGS: 0
2. FEELING DOWN, DEPRESSED OR HOPELESS: 0

## 2023-08-15 ASSESSMENT — ANXIETY QUESTIONNAIRES
3. WORRYING TOO MUCH ABOUT DIFFERENT THINGS: 0
6. BECOMING EASILY ANNOYED OR IRRITABLE: 0
1. FEELING NERVOUS, ANXIOUS, OR ON EDGE: 0
IF YOU CHECKED OFF ANY PROBLEMS ON THIS QUESTIONNAIRE, HOW DIFFICULT HAVE THESE PROBLEMS MADE IT FOR YOU TO DO YOUR WORK, TAKE CARE OF THINGS AT HOME, OR GET ALONG WITH OTHER PEOPLE: NOT DIFFICULT AT ALL
5. BEING SO RESTLESS THAT IT IS HARD TO SIT STILL: 0
7. FEELING AFRAID AS IF SOMETHING AWFUL MIGHT HAPPEN: 0
GAD7 TOTAL SCORE: 0
2. NOT BEING ABLE TO STOP OR CONTROL WORRYING: 0
4. TROUBLE RELAXING: 0

## 2023-08-17 ENCOUNTER — HOSPITAL ENCOUNTER (OUTPATIENT)
Facility: HOSPITAL | Age: 68
Setting detail: RECURRING SERIES
Discharge: HOME | End: 2023-08-20
Payer: MEDICARE

## 2023-08-17 PROCEDURE — 97161 PT EVAL LOW COMPLEX 20 MIN: CPT

## 2023-08-17 NOTE — PROGRESS NOTES
[email or web address]ICAL [facility]ERAP[facility]  [address] [facility], [address] [place] [address]:[phone]  Plan of Care / Statement of Necessity for Physical Therapy Services     Patient Name: Dinesh Ferreira : 1955   Medical   Diagnosis: Generalized weakness [R53.1] Treatment Diagnosis: M62.81  GENERAL MUSCLE WEAKNESS    Onset Date: 2023, MD order 8/15/23     Referral Source: Yao Cedeño MD Start of Care [facility]): 2023   Prior Hospitalization: See medical history Provider #: [id number]   Prior Level of Function:  I all areas of ADLs and activities, drove, worked full time, tolerated house and community activities   Comorbidities:  necrosis of pancreas, DM, HTN     Assessment / key information:   71 YO male diagnosed as above and with S/S consistent with above diagnosis presents to skilled outpatient PT [facility] upper body weakness related to inactivity after prolonged hospitalization in the Spring. He has generalized weakness B UE> LE, decrease endurance and activity tolerance. Patient demonstrates the potential to make gains with improved ROM, strength, endurance/activity tolerance, functional FOTO survey score   and all within a reasonable time frame so as to increase their functional independence with ADLs and activities for carryover to  improved quality of life, tolerance to household and community activities and work demands. Patient requires skilled Physical Therapy so as to monitor their response to and modify their treatment plan accordingly. Patient appears to be an appropriate candidate for skilled outpatient Physical Therapy.        Evaluation Complexity:  History:  HIGH Complexity :3+ comorbidities / personal factors will impact the outcome/ POC ; Examination:  HIGH Complexity : 4+ Standardized tests and measures addressing body structure, function, activity limitation and / or participation in recreation  ;Presentation:  LOW Complexity : Stable,

## 2023-08-17 NOTE — PROGRESS NOTES
PT DAILY TREATMENT NOTE/NEURO EVAL     Patient Name: Gerry Rapp    Date: 2023    : 1955  Insurance: Payor: Beatriz Byrd / Plan: [employer] GOLD PLUS HMO / Product Type: *No Product type* /      Patient  verified yes     Visit #   Current / Total 1 16   Time   In / Out 252 335   Pain   In / Out 0 0   Subjective Functional Status/Changes:       Treatment Area: Generalized weakness [R53.1]    SUBJECTIVE  Pain Level (0-10 scale): 0  []constant []intermittent []improving []worsening []no change since onset    Any medication changes, allergies to medications, adverse drug reactions, diagnosis change, or new procedure performed?: [x] No    [] Yes (see summary sheet for update)  Subjective functional status/changes:     PLOF: I all areas of ADLs and activities, drove, worked full time, tolerated house and community activities  Limitations to PLOF: upper body weakness  Mechanism of Injury: 2023, necrosis of pancreas with hospitalization x11 weeks with partial removal of pancreas and now with an exterior biliary drain and exterior pancreatic drain  Current symptoms/Complaints: 69 YO male diagnosed as above and with S/S consistent with above diagnosis presents to skilled outpatient PT [facility] upper body weakness related to inactivity after prolonged hospitalization in the Spring  Previous Treatment/Compliance: MD, self treatment  PMHx/Surgical Hx: necrosis of pancreas, DM, HTN  Work Hx: OOW and anticipates RTW 23  4 hours a day and 50# lifting limit   Living Situation: lives in a 2 story, not alone, 2 LATRICE with rail  Pt Goals: build up lost muscle mass  Barriers: []pain []financial []time []transportation []other  Motivation: good  Substance use: []Alcohol []Tobacco []other:   FABQ Score: []low []elevate  Cognition: A & O x 3    Other:    OBJECTIVE/EXAMINATION  Domestic Life: household and community activities, driving, planning for RTW  Activity/Recreational Limitations: generally deconditioned and

## 2023-08-22 ENCOUNTER — HOSPITAL ENCOUNTER (OUTPATIENT)
Facility: HOSPITAL | Age: 68
Setting detail: RECURRING SERIES
Discharge: HOME | End: 2023-08-25
Payer: MEDICARE

## 2023-08-22 PROCEDURE — 97112 NEUROMUSCULAR REEDUCATION: CPT

## 2023-08-22 PROCEDURE — 97110 THERAPEUTIC EXERCISES: CPT

## 2023-08-22 PROCEDURE — 97530 THERAPEUTIC ACTIVITIES: CPT

## 2023-08-22 NOTE — PROGRESS NOTES
PHYSICAL / OCCUPATIONAL THERAPY - DAILY TREATMENT NOTE (updated )    Patient Name: Dayne Davis    Date: 2023    : 1955  Insurance: Payor: John Zamora / Plan: Robina LOPES HMO / Product Type: *No Product type* /      Patient  verified Yes     Visit #   Current / Total 2 16   Time   In / Out 127 207   Pain   In / Out 0 0   Subjective Functional Status/Changes: Patient reports daily compliance with HEP. 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 TREATMENT AREA =  Generalized weakness [R53.1]    OBJECTIVE         Therapeutic Procedures: Tx Min Billable or 1:1 Min (if diff from Tx Min) Procedure, Rationale, Specifics   15  56104 Therapeutic Exercise (timed):  increase ROM, strength, coordination, balance, and proprioception to improve patient's ability to progress to PLOF and address remaining functional goals. (see flow sheet as applicable)     Details if applicable:       10  10290 Neuromuscular Re-Education (timed):  improve balance, coordination, kinesthetic sense, posture, core stability and proprioception to improve patient's ability to develop conscious control of individual muscles and awareness of position of extremities in order to progress to PLOF and address remaining functional goals. (see flow sheet as applicable)     Details if applicable:  scapular, hip, and glute re-education    15  03376 Therapeutic Activity (timed):  use of dynamic activities replicating functional movements to increase ROM, strength, coordination, balance, and proprioception in order to improve patient's ability to progress to PLOF and address remaining functional goals.   (see flow sheet as applicable)     Details if applicable:  functional squatting,functional marching, functional standing , farmers carries          Details if applicable:            Details if applicable:     36  [facility] Totals Reminder: bill

## 2023-08-25 ENCOUNTER — HOSPITAL ENCOUNTER (OUTPATIENT)
Facility: HOSPITAL | Age: 68
Setting detail: RECURRING SERIES
Discharge: HOME | End: 2023-08-28
Payer: MEDICARE

## 2023-08-25 PROCEDURE — 97110 THERAPEUTIC EXERCISES: CPT

## 2023-08-25 PROCEDURE — 97112 NEUROMUSCULAR REEDUCATION: CPT

## 2023-08-25 PROCEDURE — 97530 THERAPEUTIC ACTIVITIES: CPT

## 2023-08-25 NOTE — PROGRESS NOTES
PHYSICAL / OCCUPATIONAL THERAPY - DAILY TREATMENT NOTE (updated )    Patient Name: Ajit Medellin    Date: 2023    : 1955  Insurance: Payor: Maribell Peterson / Plan: [address] [place] [address] HMO / Product Type: *No Product type* /      Patient  verified Yes     Visit #   Current / Total 3 16   Time   In / Out 128 409   Pain   In / Out 0 0   Subjective Functional Status/Changes: Patient states that he is tired today. He reports some soreness/stiffness after his last session but that it didn't last long. TREATMENT AREA =  Generalized weakness [R53.1]    OBJECTIVE         Therapeutic Procedures: Tx Min Billable or 1:1 Min (if diff from Tx Min) Procedure, Rationale, Specifics   15  04762 Therapeutic Exercise (timed):  increase ROM, strength, coordination, balance, and proprioception to improve patient's ability to progress to PLOF and address remaining functional goals. (see flow sheet as applicable)     Details if applicable:       11  27705 Neuromuscular Re-Education (timed):  improve balance, coordination, kinesthetic sense, posture, core stability and proprioception to improve patient's ability to develop conscious control of individual muscles and awareness of position of extremities in order to progress to PLOF and address remaining functional goals. (see flow sheet as applicable)     Details if applicable:  scapular, hip, and glute re-education    15  01466 Therapeutic Activity (timed):  use of dynamic activities replicating functional movements to increase ROM, strength, coordination, balance, and proprioception in order to improve patient's ability to progress to PLOF and address remaining functional goals.   (see flow sheet as applicable)     Details if applicable:  functional squatting,functional marching, functional standing , farmers carries          Details if applicable:            Details if applicable:     39  [facility] Totals Reminder: bill using total billable min of TIMED therapeutic

## 2023-08-25 NOTE — TELEPHONE ENCOUNTER
Last appointment: 08/15/2023    Next appointment: 11/21/2023    Patient requesting refill for       [address] [facility], [address] [place] [address],[address] [id number]   Phone:  [phone]  Fax:  [phone]

## 2023-08-29 LAB
ALBUMIN SERPL-MCNC: 3.6 G/DL (ref 3.9–4.9)
ALBUMIN/GLOB SERPL: 1 {RATIO} (ref 1.2–2.2)
ALP SERPL-CCNC: 111 IU/L (ref 44–121)
ALT SERPL-CCNC: 14 IU/L (ref 0–44)
AST SERPL-CCNC: 17 IU/L (ref 0–40)
BILIRUB SERPL-MCNC: 0.7 MG/DL (ref 0–1.2)
BUN SERPL-MCNC: 14 MG/DL (ref 8–27)
BUN/CREAT SERPL: 13 (ref 10–24)
CALCIUM SERPL-MCNC: 8.9 MG/DL (ref 8.6–10.2)
CHLORIDE SERPL-SCNC: 102 MMOL/L (ref 96–106)
CHOLEST SERPL-MCNC: 93 MG/DL (ref 100–199)
CO2 SERPL-SCNC: 22 MMOL/L (ref 20–29)
CREAT SERPL-MCNC: 1.08 MG/DL (ref 0.76–1.27)
EGFRCR SERPLBLD CKD-EPI 2021: 74 ML/MIN/1.73
GLOBULIN SER CALC-MCNC: 3.7 G/DL (ref 1.5–4.5)
GLUCOSE SERPL-MCNC: 136 MG/DL (ref 70–99)
HBA1C MFR BLD: 6.8 % (ref 4.8–5.6)
HDLC SERPL-MCNC: 43 MG/DL
LDLC SERPL CALC-MCNC: 35 MG/DL (ref 0–99)
POTASSIUM SERPL-SCNC: 4.4 MMOL/L (ref 3.5–5.2)
PROT SERPL-MCNC: 7.3 G/DL (ref 6–8.5)
SODIUM SERPL-SCNC: 139 MMOL/L (ref 134–144)
SPECIMEN STATUS REPORT: NORMAL
TRIGL SERPL-MCNC: 68 MG/DL (ref 0–149)
TSH SERPL DL<=0.005 MIU/L-ACNC: 1.46 UIU/ML (ref 0.45–4.5)
VLDLC SERPL CALC-MCNC: 15 MG/DL (ref 5–40)

## 2023-08-30 ENCOUNTER — HOSPITAL ENCOUNTER (OUTPATIENT)
Facility: HOSPITAL | Age: 68
Setting detail: RECURRING SERIES
Discharge: HOME | End: 2023-09-02
Payer: MEDICARE

## 2023-08-30 PROCEDURE — 97530 THERAPEUTIC ACTIVITIES: CPT

## 2023-08-30 PROCEDURE — 97110 THERAPEUTIC EXERCISES: CPT

## 2023-08-30 PROCEDURE — 97112 NEUROMUSCULAR REEDUCATION: CPT

## 2023-08-30 NOTE — PROGRESS NOTES
PHYSICAL / OCCUPATIONAL THERAPY - DAILY TREATMENT NOTE (updated )    Patient Name: Farida Conrad    Date: 2023    : 1955  Insurance: Payor: Randell Chase / Plan: Leela Chandra PLUS HMO / Product Type: *No Product type* /      Patient  verified Yes     Visit #   Current / Total 4 16   Time   In / Out 410 pm  448 pm    Pain   In / Out 0/10  0/10    Subjective Functional Status/Changes: Patient denies any pain today or after last session. TREATMENT AREA =  Generalized weakness [R53.1]    OBJECTIVE         Therapeutic Procedures: Tx Min Billable or 1:1 Min (if diff from Tx Min) Procedure, Rationale, Specifics    15 61917 Therapeutic Exercise (timed):  increase ROM, strength, coordination, balance, and proprioception to improve patient's ability to progress to PLOF and address remaining functional goals. (see flow sheet as applicable)     Details if applicable:        8 97163 Neuromuscular Re-Education (timed):  improve balance, coordination, kinesthetic sense, posture, core stability and proprioception to improve patient's ability to develop conscious control of individual muscles and awareness of position of extremities in order to progress to PLOF and address remaining functional goals. (see flow sheet as applicable)     Details if applicable:      15 83136 Therapeutic Activity (timed):  use of dynamic activities replicating functional movements to increase ROM, strength, coordination, balance, and proprioception in order to improve patient's ability to progress to PLOF and address remaining functional goals.   (see flow sheet as applicable)     Details if applicable:            Details if applicable:            Details if applicable:      45 [facility] Totals Reminder: bill using total billable min of TIMED therapeutic procedures (example: do not include dry needle or estim unattended, both untimed codes, in totals to left)  8-22 min = 1 unit; 23-37 min = 2 units; 38-52 min = 3 units; 53-67 min

## 2023-09-01 ENCOUNTER — HOSPITAL ENCOUNTER (OUTPATIENT)
Facility: HOSPITAL | Age: 68
Setting detail: RECURRING SERIES
Discharge: HOME | End: 2023-09-04
Payer: MEDICARE

## 2023-09-01 PROCEDURE — 97110 THERAPEUTIC EXERCISES: CPT

## 2023-09-01 PROCEDURE — 97112 NEUROMUSCULAR REEDUCATION: CPT

## 2023-09-01 PROCEDURE — 97530 THERAPEUTIC ACTIVITIES: CPT

## 2023-09-01 NOTE — PROGRESS NOTES
PHYSICAL / OCCUPATIONAL THERAPY - DAILY TREATMENT NOTE (updated )    Patient Name: Sukhdev Roberts    Date: 2023    : 1955  Insurance: Payor: Breanna Shrestha / Plan: Bharath Mirela PLUS HMO / Product Type: *No Product type* /      Patient  verified Yes     Visit #   Current / Total 5 16   Time   In / Out 210 pm  248 pm    Pain   In / Out 0/10 0/10    Subjective Functional Status/Changes: Patient reports feeling good today and denies any increase in pain after last visit. TREATMENT AREA =  Generalized weakness [R53.1]    OBJECTIVE      Therapeutic Procedures: Tx Min Billable or 1:1 Min (if diff from Tx Min) Procedure, Rationale, Specifics   13  93150 Therapeutic Exercise (timed):  increase ROM, strength, coordination, balance, and proprioception to improve patient's ability to progress to PLOF and address remaining functional goals. (see flow sheet as applicable)     Details if applicable:       10  32123 Neuromuscular Re-Education (timed):  improve balance, coordination, kinesthetic sense, posture, core stability and proprioception to improve patient's ability to develop conscious control of individual muscles and awareness of position of extremities in order to progress to PLOF and address remaining functional goals. (see flow sheet as applicable)     Details if applicable:     15  14606 Therapeutic Activity (timed):  use of dynamic activities replicating functional movements to increase ROM, strength, coordination, balance, and proprioception in order to improve patient's ability to progress to PLOF and address remaining functional goals.   (see flow sheet as applicable)     Details if applicable:            Details if applicable:            Details if applicable:     45  [facility] Totals Reminder: bill using total billable min of TIMED therapeutic procedures (example: do not include dry needle or estim unattended, both untimed codes, in totals to left)  8-22 min = 1 unit; 23-37 min = 2 units;

## 2023-09-06 ENCOUNTER — HOSPITAL ENCOUNTER (OUTPATIENT)
Facility: HOSPITAL | Age: 68
Setting detail: RECURRING SERIES
Discharge: HOME | End: 2023-09-09
Payer: MEDICARE

## 2023-09-06 PROCEDURE — 97530 THERAPEUTIC ACTIVITIES: CPT

## 2023-09-06 PROCEDURE — 97112 NEUROMUSCULAR REEDUCATION: CPT

## 2023-09-06 PROCEDURE — 97110 THERAPEUTIC EXERCISES: CPT

## 2023-09-06 NOTE — PROGRESS NOTES
PHYSICAL / OCCUPATIONAL THERAPY - DAILY TREATMENT NOTE (updated )    Patient Name: Iván Dill    Date: 2023    : 1955  Insurance: Payor: Douglas Lorenz / Plan: Cleophas Band PLUS HMO / Product Type: *No Product type* /      Patient  verified Yes     Visit #   Current / Total 6 16   Time   In / Out 250 pm  331 pm    Pain   In / Out 0/10  0/10    Subjective Functional Status/Changes: \"I'm getting stronger. \" Patient denies any pain today. TREATMENT AREA =  Generalized weakness [R53.1]    OBJECTIVE      Therapeutic Procedures: Tx Min Billable or 1:1 Min (if diff from Tx Min) Procedure, Rationale, Specifics   15  51520 Therapeutic Exercise (timed):  increase ROM, strength, coordination, balance, and proprioception to improve patient's ability to progress to PLOF and address remaining functional goals. (see flow sheet as applicable)     Details if applicable:       11  00579 Neuromuscular Re-Education (timed):  improve balance, coordination, kinesthetic sense, posture, core stability and proprioception to improve patient's ability to develop conscious control of individual muscles and awareness of position of extremities in order to progress to PLOF and address remaining functional goals. (see flow sheet as applicable)     Details if applicable:     15  09647 Therapeutic Activity (timed):  use of dynamic activities replicating functional movements to increase ROM, strength, coordination, balance, and proprioception in order to improve patient's ability to progress to PLOF and address remaining functional goals.   (see flow sheet as applicable)     Details if applicable:            Details if applicable:            Details if applicable:     39  [facility] Totals Reminder: bill using total billable min of TIMED therapeutic procedures (example: do not include dry needle or estim unattended, both untimed codes, in totals to left)  8-22 min = 1 unit; 23-37 min = 2 units; 38-52 min = 3 units; 53-67 min =

## 2023-09-08 ENCOUNTER — HOSPITAL ENCOUNTER (OUTPATIENT)
Facility: HOSPITAL | Age: 68
Setting detail: RECURRING SERIES
Discharge: HOME | End: 2023-09-11
Payer: MEDICARE

## 2023-09-08 PROCEDURE — 97110 THERAPEUTIC EXERCISES: CPT

## 2023-09-08 PROCEDURE — 97530 THERAPEUTIC ACTIVITIES: CPT

## 2023-09-08 PROCEDURE — 97112 NEUROMUSCULAR REEDUCATION: CPT

## 2023-09-08 NOTE — PROGRESS NOTES
[facility] PHYSICAL [facility]ERAP[facility]  1710 [facility], [address] [place] [address]  HW:[phone] AD:[phone]  CONTINUED PLAN OF CARE/RECERTIFICATION FOR PHYSICAL THERAPY          Patient Name: Jarett Hernandez : 1955   Medical   Diagnosis: Generalized weakness [R53.1] Treatment Diagnosis: M62.81  GENERAL MUSCLE WEAKNESS    Onset Date: 2023, MD order 8/15/23       Referral Source: Aurelio Tiwari MD Start of Care [facility]): 2023   Prior Hospitalization: See medical history Provider #: [id number]   Prior Level of Function:  I all areas of ADLs and activities, drove, worked full time, tolerated house and community activities   Comorbidities:  necrosis of pancreas, DM, HTN     Visits from [facility]:  7 Missed Visits: 0     Progress to Goals:    Short Term Goals: To be accomplished in 4 weeks  1 patient will have established and be I with HEP to aid with independence and self management at discharge  EVAL HEP issued at evaluation  Current: patient reports compliance every other day. MET      2 patient will have FOTO improved to 64 to show progression toward projected improvement with ADLS and activities  EVAL 59   Current: 74 points. MET      Long Term Goals: To be accomplished in 8 weeks  1 patient will have established and be I with HEP to aid with independence and self management at discharge  EVAL HEP issued at evaluation  Current: patient reports compliance every other day. MET      2 patient will have FOTO improved to 70 to show significant and projected gains with ADLS and activities at home and in the community  EVAL 59  Current: 74 points. MET      3 patient will tolerate front carry 15# 25'x2 with no difficulty for carryover to work demands  EVAL: OOW until 23  Current: Patient performed 15# front carry 30 ft x 3 with no difficulty or pain.  MET      4 patient will tolerate stoop and lift 15# floor to waist 5x with no difficulty for carryover to work demands  EVAL:
Goals: To be accomplished in 8 weeks  1 patient will have established and be I with HEP to aid with independence and self management at discharge  EVAL HEP issued at evaluation  Current: patient reports compliance every other day. MET      2 patient will have FOTO improved to 70 to show significant and projected gains with ADLS and activities at home and in the community  EVAL 59  Current: 74 points. MET      3 patient will tolerate front carry 15# 25'x2 with no difficulty for carryover to work demands  EVAL: OOW until 23  Current: Patient performed 15# front carry 30 ft x 3 with no difficulty or pain. MET      4 patient will tolerate stoop and lift 15# floor to waist 5x with no difficulty for carryover to work demands  EVAL: OOW until 23  Current: Patient able to perform 15# stoop and lift x5 with no difficulty. MET      5 patient will report overall 50% improvement with upper body strength for carryover to increase tolerance to ADLS and activities at home and at work  EVAL: OOW until 23 and [facility] weak upper body due to inactivity from illness   Current: Patient 50% improvement.  MET      Next PN/ RC due 2023  Auth due 16 visits  10/31/2023    PLAN  Yes  Continue plan of care  []  Upgrade activities as tolerated  []  Discharge due to :  []  Other:     Governor Wally PTA    2023    3:34 PM    Future Appointments   Date Time Provider [id number]  46[facility]   2023  2:50 PM Jeffrie Kehr, PTA [facility]   2023  1:30 PM Greyson Randolph PT [facility]   2023  2:50 PM Governor Wally PTA [facility]   2023  1:30 PM Greyson Randolph PT [facility]   2023  3:40 PM Levi Trinidad MD [facility] AMB

## 2023-09-19 ENCOUNTER — HOSPITAL ENCOUNTER (OUTPATIENT)
Facility: HOSPITAL | Age: 68
Setting detail: RECURRING SERIES
End: 2023-09-19
Payer: MEDICARE

## 2023-09-19 ENCOUNTER — TELEPHONE (OUTPATIENT)
Facility: HOSPITAL | Age: 68
End: 2023-09-19

## 2023-09-19 NOTE — TELEPHONE ENCOUNTER
Patient cancelled appt. on 9/19/23 at 11:03am due to the patient going out of town for a medical procedure and he is not able to make his appt. today.

## 2023-09-22 ENCOUNTER — APPOINTMENT (OUTPATIENT)
Facility: HOSPITAL | Age: 68
End: 2023-09-22
Payer: MEDICARE

## 2023-09-27 ENCOUNTER — HOSPITAL ENCOUNTER (OUTPATIENT)
Facility: HOSPITAL | Age: 68
Setting detail: RECURRING SERIES
Discharge: HOME | End: 2023-09-30
Payer: MEDICARE

## 2023-09-27 PROCEDURE — 97110 THERAPEUTIC EXERCISES: CPT

## 2023-09-27 PROCEDURE — 97112 NEUROMUSCULAR REEDUCATION: CPT

## 2023-09-27 PROCEDURE — 97530 THERAPEUTIC ACTIVITIES: CPT

## 2023-09-27 NOTE — PROGRESS NOTES
PHYSICAL / OCCUPATIONAL THERAPY - DAILY TREATMENT NOTE (updated )    Patient Name: Poly Fuelling    Date: 2023    : 1955  Insurance: Payor: Claudia Jorge / Plan: Parish Pardo PLUS HMO / Product Type: *No Product type* /      Patient  verified Yes     Visit #   Current / Total 1 8   Time   In / Out 251 pm  329 pm    Pain   In / Out 0/10  0/10    Subjective Functional Status/Changes: \"It been busy since I came back from vacation and I was sick on day. \" Patient denies any pain today. TREATMENT AREA =  Generalized weakness [R53.1]    OBJECTIVE         Therapeutic Procedures: Tx Min Billable or 1:1 Min (if diff from Tx Min) Procedure, Rationale, Specifics   15  29946 Therapeutic Exercise (timed):  increase ROM, strength, coordination, balance, and proprioception to improve patient's ability to progress to PLOF and address remaining functional goals. (see flow sheet as applicable)     Details if applicable:       8  47791 Neuromuscular Re-Education (timed):  improve balance, coordination, kinesthetic sense, posture, core stability and proprioception to improve patient's ability to develop conscious control of individual muscles and awareness of position of extremities in order to progress to PLOF and address remaining functional goals. (see flow sheet as applicable)     Details if applicable:     15  39580 Therapeutic Activity (timed):  use of dynamic activities replicating functional movements to increase ROM, strength, coordination, balance, and proprioception in order to improve patient's ability to progress to PLOF and address remaining functional goals.   (see flow sheet as applicable)     Details if applicable:            Details if applicable:            Details if applicable:     45  [facility] Totals Reminder: bill using total billable min of TIMED therapeutic procedures (example: do not include dry needle or estim unattended, both untimed codes, in totals to left)  8-22 min = 1 unit; 23-37

## 2023-09-28 NOTE — TELEPHONE ENCOUNTER
Last Visit: 08- OV   Next Appointment: 11-  Previous Refill Encounter: 06- #100each with 2 refills      Requested Prescriptions     Pending Prescriptions Disp Refills    BD PEN NEEDLE ARNOLD U/F 32G X 4 MM MISC 100 each 2     Sig: Use as directed

## 2023-09-29 ENCOUNTER — HOSPITAL ENCOUNTER (OUTPATIENT)
Facility: HOSPITAL | Age: 68
Setting detail: RECURRING SERIES
End: 2023-09-29
Payer: MEDICARE

## 2023-09-29 PROCEDURE — 97530 THERAPEUTIC ACTIVITIES: CPT

## 2023-09-29 PROCEDURE — 97110 THERAPEUTIC EXERCISES: CPT

## 2023-09-29 PROCEDURE — 97112 NEUROMUSCULAR REEDUCATION: CPT

## 2023-09-29 NOTE — PROGRESS NOTES
units; 68-82 min = 5 units   Total Total     [x]  Patient Education billed concurrently with other procedures   [x] Review HEP    [] Progressed/Changed HEP, detail:    [] Other detail:       Objective Information/Functional Measures/Assessment    VC exercises and technique and issued blue band for HEP  Progressed reps per card with no difficulty noted  Patient reports decrease endurance  with RTW work demands and remains on lifting restriction  per MD    Patient will continue to benefit from skilled PT / OT services to modify and progress therapeutic interventions, analyze and address ROM deficits, analyze and address strength deficits, analyze and address soft tissue restrictions, analyze and cue for proper movement patterns, analyze and modify for postural abnormalities, and instruct in home and community integration to address functional deficits and attain remaining goals. Progress toward goals / Updated goals:  []  See Progress Note/Recertification     1. Patient will tolerate walking a mile with no increase in pain or reports of fatigue in order to tolerating ambulating community distances. PN: Patient has not returned to walking in the community. CURRENT NA 9/29/23     2. Patient will tolerate 20# overhead box lift in order to assist with performing job related activities with ease. PN: Not initiated. Current: Patient performed 10# box OH x 5 with no increase in pain. 9/27/2023  CURRENT NA 9/29/23      3. Patient will tolerate 100# push/pull sled x 2 with no reports of difficulty to assist with performing job related and yard work duties with ease. PN: Not initiated. Current: Patient was able to tolerate 45# push/pull sled with no reports of increased pain.  9/29/2023     Next PN/ RC due 10/18/2023  Auth due 16 visits exp 10/31/2023       PLAN  - Continue Plan of Care  - Upgrade activities as tolerated    Scotty Kemp PT    9/29/2023    1:35 PM    Future Appointments   Date Time Provider

## 2023-09-30 RX ORDER — PEN NEEDLE, DIABETIC 32GX 5/32"
NEEDLE, DISPOSABLE MISCELLANEOUS
Qty: 100 EACH | Refills: 2 | Status: SHIPPED | OUTPATIENT
Start: 2023-09-30

## 2023-10-03 ENCOUNTER — HOSPITAL ENCOUNTER (OUTPATIENT)
Facility: HOSPITAL | Age: 68
Setting detail: RECURRING SERIES
Discharge: HOME | End: 2023-10-06
Payer: MEDICARE

## 2023-10-03 PROCEDURE — 97530 THERAPEUTIC ACTIVITIES: CPT

## 2023-10-03 PROCEDURE — 97110 THERAPEUTIC EXERCISES: CPT

## 2023-10-03 NOTE — PROGRESS NOTES
PHYSICAL / OCCUPATIONAL THERAPY - DAILY TREATMENT NOTE (updated )    Patient Name: Gerry Rapp    Date: 10/3/2023    : 1955  Insurance: Payor: Beatriz Byrd / Plan: [employer]OP[employer] HMO / Product Type: *No Product type* /      Patient  verified Yes     Visit #   Current / Total 3 8   Time   In / Out 450 535   Pain   In / Out 0 0   Subjective Functional Status/Changes: Patient complains of no pain today      TREATMENT AREA =  Generalized weakness [R53.1]    OBJECTIVE        Therapeutic Procedures: Tx Min Billable or 1:1 Min (if diff from Tx Min) Procedure, Rationale, Specifics   25  89574 Therapeutic Exercise (timed):  increase ROM, strength, coordination, balance, and proprioception to improve patient's ability to progress to PLOF and address remaining functional goals. (see flow sheet as applicable)     Details if applicable:       20  59312 Therapeutic Activity (timed):  use of dynamic activities replicating functional movements to increase ROM, strength, coordination, balance, and proprioception in order to improve patient's ability to progress to PLOF and address remaining functional goals.   (see flow sheet as applicable)     Details if applicable:            Details if applicable:            Details if applicable:            Details if applicable:     39  [facility] Totals Reminder: bill using total billable min of TIMED therapeutic procedures (example: do not include dry needle or estim unattended, both untimed codes, in totals to left)  8-22 min = 1 unit; 23-37 min = 2 units; 38-52 min = 3 units; 53-67 min = 4 units; 68-82 min = 5 units   Total Total     [x]  Patient Education billed concurrently with other procedures   [x] Review HEP    [] Progressed/Changed HEP, detail:    [] Other detail:       Objective Information/Functional Measures/Assessment  Pt progressed to 2# for march  and also progressed to 15 reps for some exercises  per Therapy Flow sheet and pt also progressed to Front carry

## 2023-10-09 ENCOUNTER — HOSPITAL ENCOUNTER (OUTPATIENT)
Facility: HOSPITAL | Age: 68
Setting detail: RECURRING SERIES
Discharge: HOME | End: 2023-10-12
Payer: MEDICARE

## 2023-10-09 PROCEDURE — 97530 THERAPEUTIC ACTIVITIES: CPT

## 2023-10-09 PROCEDURE — 97110 THERAPEUTIC EXERCISES: CPT

## 2023-10-09 RX ORDER — PEN NEEDLE, DIABETIC 32GX 5/32"
NEEDLE, DISPOSABLE MISCELLANEOUS
Refills: 2 | OUTPATIENT
Start: 2023-10-09

## 2023-10-09 NOTE — TELEPHONE ENCOUNTER
Last Visit: 08- OV   Next Appointment: 1-  Previous Refill Encounter: novolog on 06- #7. 2mL with 1 refills  Requested Prescriptions     Pending Prescriptions Disp Refills    NOVOLOG FLEXPEN 100 UNIT/ML injection pen [Pharmacy Med Name: NOVOLOG 100 UNIT/ML FLEXPEN] 7.2 mL 1     Sig: INJECT 8 UNITS UNDER THE SKIN 3 TIMES DAILY BEFORE BREAKFAST LUNCH AND DINNER. GIVE ADDITIONAL UNITS FOR -249:2 UNITS. .. 250-299:4 UNITS. .. 300-349:6 UNITS. .. 350-399:8 UNITS. ..>=400:10 UNITS     Refused Prescriptions Disp Refills    BD PEN NEEDLE ARNOLD U/F 32G X 4 MM MISC [Pharmacy Med Name: BD UF ARNOLD PEN NEEDLE [id number]]  2     Sig: Use as directed

## 2023-10-09 NOTE — PROGRESS NOTES
PHYSICAL / OCCUPATIONAL THERAPY - DAILY TREATMENT NOTE (updated )    Patient Name: Hortencia Brothers    Date: 10/9/2023    : 1955  Insurance: Payor: Kevin Azizafrancisco j / Plan: [address] [place] [address] HMO / Product Type: *No Product type* /      Patient  verified Yes     Visit #   Current / Total 4 8   Time   In / Out 2:52 3:34   Pain   In / Out 0/10 0/10   Subjective Functional Status/Changes: Pt reports significant improvement since beginning PT (60%). Improvement noted in strength, flexibility, and slowly improving endurance. Continued limitation with walking endurance and prolonged work activity, as well as heavy lifting. TREATMENT AREA =  Generalized weakness [R53.1]    OBJECTIVE        Therapeutic Procedures: Tx Min Billable or 1:1 Min (if diff from Tx Min) Procedure, Rationale, Specifics   18  97887 Therapeutic Exercise (timed):  increase ROM, strength, coordination, balance, and proprioception to improve patient's ability to progress to PLOF and address remaining functional goals. (see flow sheet as applicable)     Details if applicable:        04494 Therapeutic Activity (timed):  use of dynamic activities replicating functional movements to increase ROM, strength, coordination, balance, and proprioception in order to improve patient's ability to progress to PLOF and address remaining functional goals.   (see flow sheet as applicable)     Details if applicable:            Details if applicable:            Details if applicable:            Details if applicable:     43 [address] [facility] Totals Reminder: bill using total billable min of TIMED therapeutic procedures (example: do not include dry needle or estim unattended, both untimed codes, in totals to left)  8-22 min = 1 unit; 23-37 min = 2 units; 38-52 min = 3 units; 53-67 min = 4 units; 68-82 min = 5 units   Total Total     [x]  Patient Education billed concurrently with other procedures   [x] Review HEP    [] Progressed/Changed HEP, detail:    []

## 2023-10-09 NOTE — PROGRESS NOTES
[facility]  [facility], [address] [place] [address]  KB:[phone] FJ:[phone]  PHYSICAL THERAPY PROGRESS NOTE  Patient Name: Maria Luz Andersen : 1955   Treatment/Medical Diagnosis: Generalized weakness [R53.1]   Referral Source: Ranjeet Rodgers MD     Date of Initial Visit: 2023 Attended Visits: 11 Missed Visits: 3     SUMMARY OF TREATMENT  Pt has attended 11 sessions of PT for the tx of general UE/LE weakness. PT tx has consisted of therex/theract to improve UE/LE strength/stability, flexibility, and functional activity tolerance. Pt showing excellent progress, with objective improvements noted to UE and LE strength, walking tolerance, and tolerance to work duties. Remains mildly limited in walking endurance and moderately limited in work tolerance. CURRENT STATUS  % improvement: 60%    Current improvements: strength, flexibility, improving endurance, ability to lift 5 gallon paint bucket into shopping cart, improving ease with yard work  Remaining functional limitations: decreased endurance with work duties (currently working 5 4-hour shifts; normal shift is 8 hours); walking the dog (has not started yet)    Objective measures:  Strength (MMT):  Shoulder L (1-5) R (1-5)   Shoulder Flexion 4 4   Shoulder ABD 4+ 4+   Elbow F/E 4+/4 4+/4                                             Hip L (1-5) R (1-5)   Hip Flexion 4 4   Hip ABD 4 4   Hip ADD 4 4         5x STS: 10\"  6 MWT: 0.18 miles (performed on TM)    FOTO 88/100    Progress to Goals:  Patient will tolerate walking a mile with no increase in pain or reports of fatigue in order to tolerating ambulating community distances. Status at last Eval: has not returned to walking in the community. Current Status: has not returned to walking in community; 6MWT: 0.18 miles  Goal Met?  progress     2.   Patient will tolerate 20# overhead box lift in order to assist with performing job related

## 2023-10-15 RX ORDER — INSULIN ASPART 100 [IU]/ML
INJECTION, SOLUTION INTRAVENOUS; SUBCUTANEOUS
Qty: 7.2 ML | Refills: 1 | Status: SHIPPED | OUTPATIENT
Start: 2023-10-15

## 2023-10-16 ENCOUNTER — HOSPITAL ENCOUNTER (OUTPATIENT)
Facility: HOSPITAL | Age: 68
Setting detail: RECURRING SERIES
Discharge: HOME | End: 2023-10-19
Payer: MEDICARE

## 2023-10-16 PROCEDURE — 97110 THERAPEUTIC EXERCISES: CPT

## 2023-10-16 PROCEDURE — 97112 NEUROMUSCULAR REEDUCATION: CPT

## 2023-10-16 PROCEDURE — 97530 THERAPEUTIC ACTIVITIES: CPT

## 2023-10-16 NOTE — PROGRESS NOTES
PHYSICAL / OCCUPATIONAL THERAPY - DAILY TREATMENT NOTE (updated )    Patient Name: Hien Jarvis    Date: 10/16/2023    : 1955  Insurance: Payor: Nicolle Dobbs / Plan: [address] [place] [address] HMO / Product Type: *No Product type* /      Patient  verified Yes     Visit #   Current / Total 5 8   Time   In / Out 2:50 3:28   Pain   In / Out 0 0   Subjective Functional Status/Changes: Pt reports in general he feels like he is more flexible and not getting tired as much      TREATMENT AREA =  Generalized weakness [R53.1]    OBJECTIVE         Therapeutic Procedures: Tx Min Billable or 1:1 Min (if diff from Tx Min) Procedure, Rationale, Specifics   13  72992 Therapeutic Exercise (timed):  increase ROM, strength, coordination, balance, and proprioception to improve patient's ability to progress to PLOF and address remaining functional goals. (see flow sheet as applicable)     Details if applicable:       15  64905 Therapeutic Activity (timed):  use of dynamic activities replicating functional movements to increase ROM, strength, coordination, balance, and proprioception in order to improve patient's ability to progress to PLOF and address remaining functional goals. (see flow sheet as applicable)     Details if applicable:     10  09037 Neuromuscular Re-Education (timed):  improve balance, coordination, kinesthetic sense, posture, core stability and proprioception to improve patient's ability to develop conscious control of individual muscles and awareness of position of extremities in order to progress to PLOF and address remaining functional goals.  (see flow sheet as applicable)     Details if applicable:            Details if applicable:            Details if applicable:     45  [facility] Totals Reminder: bill using total billable min of TIMED therapeutic procedures (example: do not include dry needle or estim unattended, both untimed codes, in totals to left)  8-22 min = 1 unit; 23-37 min = 2 units; 38-52 min =

## 2023-10-18 ENCOUNTER — HOSPITAL ENCOUNTER (OUTPATIENT)
Facility: HOSPITAL | Age: 68
Setting detail: RECURRING SERIES
End: 2023-10-18
Payer: MEDICARE

## 2023-10-20 ENCOUNTER — HOSPITAL ENCOUNTER (OUTPATIENT)
Facility: HOSPITAL | Age: 68
Setting detail: RECURRING SERIES
Discharge: HOME | End: 2023-10-23
Payer: MEDICARE

## 2023-10-20 PROCEDURE — 97112 NEUROMUSCULAR REEDUCATION: CPT

## 2023-10-20 PROCEDURE — 97110 THERAPEUTIC EXERCISES: CPT

## 2023-10-20 PROCEDURE — 97530 THERAPEUTIC ACTIVITIES: CPT

## 2023-10-20 NOTE — PROGRESS NOTES
[x]  Patient Education billed concurrently with other procedures   [x] Review HEP    [] Progressed/Changed HEP, detail:    [] Other detail:       Objective Information/Functional Measures/Assessment  Pt was challenged with 5# during standing ABD. Pt required cues on performing squats with correct form. Overall pt responded well to remaining there ex with no difficulty or pain. Patient will continue to benefit from skilled PT / OT services to modify and progress therapeutic interventions, analyze and address functional mobility deficits, analyze and address ROM deficits, analyze and address strength deficits, analyze and address soft tissue restrictions, analyze and cue for proper movement patterns, analyze and modify for postural abnormalities, and instruct in home and community integration to address functional deficits and attain remaining goals. Progress toward goals / Updated goals:  []  See Progress Note/Recertification  Patient will tolerate walking a mile with no increase in pain or reports of fatigue in order to tolerating ambulating community distances. Status at last Eval: has not returned to walking in the community. Current Status: has not returned to walking in community; 6MWT: 0.18 miles  Goal Met?  progress     2. Patient will tolerate 20# overhead box lift in order to assist with performing job related activities with ease. Status at last Eval: Not initiated. Current Status: 20# OH box lift x 5  Goal Met?  yes     3. Patient will tolerate 100# push/pull sled x 2 with no reports of difficulty to assist with performing job related and yard work duties with ease  Status at last Eval: Not initiated.    Current Status: 75# x 1  Goal Met?   80#   2 x   10/20/23 progressing     Next PN/ RC due 11/9/2023  Auth due 16 visits exp 10/31/2023      PLAN  - Continue Plan of Care    Maxine Austin, ROSA    10/20/2023    8:15 AM    Future Appointments   Date Time Provider [address]

## 2023-10-25 ENCOUNTER — HOSPITAL ENCOUNTER (OUTPATIENT)
Facility: HOSPITAL | Age: 68
Setting detail: RECURRING SERIES
Discharge: HOME | End: 2023-10-28
Payer: MEDICARE

## 2023-10-25 PROCEDURE — 97110 THERAPEUTIC EXERCISES: CPT

## 2023-10-25 PROCEDURE — 97530 THERAPEUTIC ACTIVITIES: CPT

## 2023-10-25 PROCEDURE — 97112 NEUROMUSCULAR REEDUCATION: CPT

## 2023-10-25 NOTE — PROGRESS NOTES
PHYSICAL / OCCUPATIONAL THERAPY - DAILY TREATMENT NOTE (updated )    Patient Name: Ashley Elias    Date: 10/25/2023    : 1955  Insurance: Payor: Marcella Morin / Plan: [address] [place] [address] HMO / Product Type: *No Product type* /      Patient  verified Yes     Visit #   Current / Total 7 8   Time   In / Out 3:30 4:18   Pain   In / Out 0 0   Subjective Functional Status/Changes: \"Im feeling ok. \"     TREATMENT AREA =  Generalized weakness [R53.1]    OBJECTIVE         Therapeutic Procedures: Tx Min Billable or 1:1 Min (if diff from Tx Min) Procedure, Rationale, Specifics   24  85170 Therapeutic Exercise (timed):  increase ROM, strength, coordination, balance, and proprioception to improve patient's ability to progress to PLOF and address remaining functional goals. (see flow sheet as applicable)     Details if applicable:  increased rep per flow sheet     16  08999 Therapeutic Activity (timed):  use of dynamic activities replicating functional movements to increase ROM, strength, coordination, balance, and proprioception in order to improve patient's ability to progress to PLOF and address remaining functional goals. (see flow sheet as applicable)     Details if applicable:  increased weights for stoop an lift   8  95084 Neuromuscular Re-Education (timed):  improve balance, coordination, kinesthetic sense, posture, core stability and proprioception to improve patient's ability to develop conscious control of individual muscles and awareness of position of extremities in order to progress to PLOF and address remaining functional goals.  (see flow sheet as applicable)     Details if applicable:            Details if applicable:            Details if applicable:     50  [address] [facility] [address] Totals Reminder: bill using total billable min of TIMED therapeutic procedures (example: do not include dry needle or estim unattended, both untimed codes, in totals to left)  8-22 min = 1 unit; 23-37 min = 2 units; 38-52 min = 3

## 2023-10-30 ENCOUNTER — HOSPITAL ENCOUNTER (OUTPATIENT)
Facility: HOSPITAL | Age: 68
Setting detail: RECURRING SERIES
Discharge: HOME | End: 2023-11-02
Payer: MEDICARE

## 2023-10-30 PROCEDURE — 97110 THERAPEUTIC EXERCISES: CPT

## 2023-10-30 PROCEDURE — 97530 THERAPEUTIC ACTIVITIES: CPT

## 2023-10-30 NOTE — PROGRESS NOTES
PHYSICAL / OCCUPATIONAL THERAPY - DAILY TREATMENT NOTE (updated )    Patient Name: Ajit Medellin    Date: 10/30/2023    : 1955  Insurance: Payor: Maribell Peterson / Plan: [address] [place] [address] HMO / Product Type: *No Product type* /      Patient  verified Yes     Visit #   Current / Total 8 8   Time   In / Out 10:12 10:52   Pain   In / Out 0 0   Subjective Functional Status/Changes: Pt reports that he feels okay with today being his last day, notes that he has made good improvement. TREATMENT AREA =  Generalized weakness [R53.1]    OBJECTIVE         Therapeutic Procedures: Tx Min Billable or 1:1 Min (if diff from Tx Min) Procedure, Rationale, Specifics   25  69786 Therapeutic Exercise (timed):  increase ROM, strength, coordination, balance, and proprioception to improve patient's ability to progress to PLOF and address remaining functional goals. (see flow sheet as applicable)     Details if applicable:       15  01473 Therapeutic Activity (timed):  use of dynamic activities replicating functional movements to increase ROM, strength, coordination, balance, and proprioception in order to improve patient's ability to progress to PLOF and address remaining functional goals.   (see flow sheet as applicable)     Details if applicable:            Details if applicable:            Details if applicable:            Details if applicable:     36  [facility] Totals Reminder: bill using total billable min of TIMED therapeutic procedures (example: do not include dry needle or estim unattended, both untimed codes, in totals to left)  8-22 min = 1 unit; 23-37 min = 2 units; 38-52 min = 3 units; 53-67 min = 4 units; 68-82 min = 5 units   Total Total     [x]  Patient Education billed concurrently with other procedures   [x] Review HEP    [] Progressed/Changed HEP, detail:    [] Other detail:       Objective Information/Functional Measures/Assessment    Discharge completed this date due to great progress with goals and pt

## 2023-10-30 NOTE — THERAPY EVALUATION
[facility]  [address]  TO:[phone] EU:[phone]  DISCHARGE SUMMARY  Patient Name: Julián Nation : 1955   Treatment/Medical Diagnosis: Generalized weakness [R53.1]   Referral Source: Melany Clark MD     Date of Initial Visit: 23 Attended Visits: 15 Missed Visits: 4     SUMMARY OF TREATMENT  Pt has attended 15 sessions of PT for the tx of general UE/LE weakness. PT tx has consisted of therex/theract to improve UE/LE strength/stability, flexibility, and functional activity tolerance. CURRENT STATUS  Pt reports 80% improvement since starting PT. Lacking 20% in overall strength. Notes improved walking tolerance. Has been able to increase activity outside with chores and yard work. Does not report difficulty with yard work this date. Pt provided updated HEP and provided equipment for proper progression of tasks at home for continued gains. Patient will tolerate walking a mile with no increase in pain or reports of fatigue in order to tolerating ambulating community distances. Status at last Eval: has not returned to walking in the community. Current Status: has not returned to walking in community; 6MWT: 0.18 miles  Goal Met?  10/30- walked 1 mile this date MET     2. Patient will tolerate 20# overhead box lift in order to assist with performing job related activities with ease. Status at last Eval: Not initiated. Current Status: 20# OH box lift x 5  Goal Met? MET     3. Patient will tolerate 100# push/pull sled x 2 with no reports of difficulty to assist with performing job related and yard work duties with ease  Status at last Eval: Not initiated. Current Status: 75# x 1  Goal Met?   80#   2 x   10/25/23 100# 10/30/23 MET    RECOMMENDATIONS  Discontinue therapy. Progressing towards or have reached established goals.     If you have any questions/comments please contact us directly at

## 2023-11-13 NOTE — TELEPHONE ENCOUNTER
Last Visit: 08- OV   Next Appointment: 11-  Previous Refill Encounter: 09- #100 each with 2 refills  New pharmacy.        Requested Prescriptions     Pending Prescriptions Disp Refills    BD PEN NEEDLE ARNOLD U/F 32G X 4 MM MISC [Pharmacy Med Name: BD UF ARNOLD PEN NEEDLE [id number]]  2     Sig: USE AS DIRECTED

## 2023-11-18 RX ORDER — PEN NEEDLE, DIABETIC 32GX 5/32"
NEEDLE, DISPOSABLE MISCELLANEOUS
Qty: 100 EACH | Refills: 2 | Status: SHIPPED | OUTPATIENT
Start: 2023-11-18

## 2024-01-02 NOTE — TELEPHONE ENCOUNTER
Last Visit: 08- OV   Next Appointment: none  Previous Refill Encounter: 12- #90 tabs with 3 refills        Requested Prescriptions     Pending Prescriptions Disp Refills    lisinopril (PRINIVIL;ZESTRIL) 10 MG tablet [Pharmacy Med Name: LISINOPRIL 10 MG Tablet] 90 tablet 3     Sig: TAKE 1 TABLET EVERY DAY

## 2024-01-04 RX ORDER — LISINOPRIL 10 MG/1
TABLET ORAL
Qty: 90 TABLET | Refills: 3 | Status: SHIPPED | OUTPATIENT
Start: 2024-01-04

## 2024-09-16 ENCOUNTER — TRANSCRIBE ORDERS (OUTPATIENT)
Facility: HOSPITAL | Age: 69
End: 2024-09-16

## 2024-09-16 DIAGNOSIS — N18.31 CHRONIC KIDNEY DISEASE, STAGE 3A (HCC): Primary | ICD-10-CM

## 2024-11-08 ENCOUNTER — HOSPITAL ENCOUNTER (OUTPATIENT)
Facility: HOSPITAL | Age: 69
Discharge: HOME | End: 2024-11-11
Attending: INTERNAL MEDICINE
Payer: MEDICARE

## 2024-11-08 DIAGNOSIS — N18.31 CHRONIC KIDNEY DISEASE, STAGE 3A (HCC): ICD-10-CM

## 2024-11-08 PROCEDURE — 76770 US EXAM ABDO BACK WALL COMP: CPT

## 2024-11-12 ENCOUNTER — HOSPITAL ENCOUNTER (OUTPATIENT)
Facility: HOSPITAL | Age: 69
Discharge: HOME | End: 2024-11-15
Payer: MEDICARE

## 2024-11-12 LAB
25(OH)D3 SERPL-MCNC: 39.1 NG/ML (ref 30–100)
ALBUMIN SERPL-MCNC: 3.9 G/DL (ref 3.4–5)
ANION GAP SERPL CALC-SCNC: 4 MMOL/L (ref 3–18)
APPEARANCE UR: CLEAR
BACTERIA URNS QL MICRO: NEGATIVE /HPF
BILIRUB UR QL: NEGATIVE
BUN SERPL-MCNC: 38 MG/DL (ref 7–18)
BUN/CREAT SERPL: 23 (ref 12–20)
CALCIUM SERPL-MCNC: 9.1 MG/DL (ref 8.5–10.1)
CHLORIDE SERPL-SCNC: 106 MMOL/L (ref 100–111)
CO2 SERPL-SCNC: 25 MMOL/L (ref 21–32)
COLOR UR: YELLOW
CREAT SERPL-MCNC: 1.62 MG/DL (ref 0.6–1.3)
CREAT UR-MCNC: 73 MG/DL (ref 30–125)
EPITH CASTS URNS QL MICRO: NEGATIVE /LPF (ref 0–5)
ERYTHROCYTE [DISTWIDTH] IN BLOOD BY AUTOMATED COUNT: 14 % (ref 11.6–14.5)
GLUCOSE SERPL-MCNC: 295 MG/DL (ref 74–99)
GLUCOSE UR STRIP.AUTO-MCNC: 500 MG/DL
HCT VFR BLD AUTO: 33 % (ref 36–48)
HGB BLD-MCNC: 10.6 G/DL (ref 13–16)
HGB UR QL STRIP: NEGATIVE
KETONES UR QL STRIP.AUTO: NEGATIVE MG/DL
LEUKOCYTE ESTERASE UR QL STRIP.AUTO: NEGATIVE
MCH RBC QN AUTO: 28.4 PG (ref 24–34)
MCHC RBC AUTO-ENTMCNC: 32.1 G/DL (ref 31–37)
MCV RBC AUTO: 88.5 FL (ref 78–100)
MICROALBUMIN UR-MCNC: <0.5 MG/DL (ref 0–3)
MICROALBUMIN/CREAT UR-RTO: NORMAL MG/G (ref 0–30)
NITRITE UR QL STRIP.AUTO: NEGATIVE
NRBC # BLD: 0 K/UL (ref 0–0.01)
NRBC BLD-RTO: 0 PER 100 WBC
PH UR STRIP: 5.5 (ref 5–8)
PHOSPHATE SERPL-MCNC: 2.9 MG/DL (ref 2.5–4.9)
PLATELET # BLD AUTO: 110 K/UL (ref 135–420)
PMV BLD AUTO: 9.8 FL (ref 9.2–11.8)
POTASSIUM SERPL-SCNC: 5.7 MMOL/L (ref 3.5–5.5)
PROT UR STRIP-MCNC: NEGATIVE MG/DL
RBC # BLD AUTO: 3.73 M/UL (ref 4.35–5.65)
RBC #/AREA URNS HPF: ABNORMAL /HPF (ref 0–5)
SODIUM SERPL-SCNC: 135 MMOL/L (ref 136–145)
SP GR UR REFRACTOMETRY: 1.02 (ref 1–1.03)
UROBILINOGEN UR QL STRIP.AUTO: 0.2 EU/DL (ref 0.2–1)
WBC # BLD AUTO: 4.3 K/UL (ref 4.6–13.2)
WBC URNS QL MICRO: ABNORMAL /HPF (ref 0–5)

## 2024-11-12 PROCEDURE — 82570 ASSAY OF URINE CREATININE: CPT

## 2024-11-12 PROCEDURE — 82043 UR ALBUMIN QUANTITATIVE: CPT

## 2024-11-12 PROCEDURE — 85027 COMPLETE CBC AUTOMATED: CPT

## 2024-11-12 PROCEDURE — 80069 RENAL FUNCTION PANEL: CPT

## 2024-11-12 PROCEDURE — 83970 ASSAY OF PARATHORMONE: CPT

## 2024-11-12 PROCEDURE — 82306 VITAMIN D 25 HYDROXY: CPT

## 2024-11-12 PROCEDURE — 81001 URINALYSIS AUTO W/SCOPE: CPT

## 2024-11-12 PROCEDURE — 36415 COLL VENOUS BLD VENIPUNCTURE: CPT

## 2024-11-13 LAB
CALCIUM SERPL-MCNC: 9.5 MG/DL (ref 8.5–10.1)
PTH-INTACT SERPL-MCNC: 68.1 PG/ML (ref 18.4–88)

## 2025-01-16 ENCOUNTER — HOSPITAL ENCOUNTER (OUTPATIENT)
Facility: HOSPITAL | Age: 70
Discharge: HOME | End: 2025-01-19
Payer: MEDICARE

## 2025-01-16 ENCOUNTER — TRANSCRIBE ORDERS (OUTPATIENT)
Facility: HOSPITAL | Age: 70
End: 2025-01-16

## 2025-01-16 DIAGNOSIS — D64.9 CHRONIC ANEMIA: Primary | ICD-10-CM

## 2025-01-16 DIAGNOSIS — D64.9 CHRONIC ANEMIA: ICD-10-CM

## 2025-01-16 LAB
ERYTHROCYTE [DISTWIDTH] IN BLOOD BY AUTOMATED COUNT: 14.4 % (ref 11.6–14.5)
HCT VFR BLD AUTO: 36.2 % (ref 36–48)
HGB BLD-MCNC: 11.5 G/DL (ref 13–16)
MCH RBC QN AUTO: 27.8 PG (ref 24–34)
MCHC RBC AUTO-ENTMCNC: 31.8 G/DL (ref 31–37)
MCV RBC AUTO: 87.7 FL (ref 78–100)
NRBC # BLD: 0 K/UL (ref 0–0.01)
NRBC BLD-RTO: 0 PER 100 WBC
PLATELET # BLD AUTO: 95 K/UL (ref 135–420)
PMV BLD AUTO: 9.9 FL (ref 9.2–11.8)
RBC # BLD AUTO: 4.13 M/UL (ref 4.35–5.65)
WBC # BLD AUTO: 2.8 K/UL (ref 4.6–13.2)

## 2025-01-16 PROCEDURE — 36415 COLL VENOUS BLD VENIPUNCTURE: CPT

## 2025-01-16 PROCEDURE — 85027 COMPLETE CBC AUTOMATED: CPT

## 2025-02-11 ENCOUNTER — APPOINTMENT (OUTPATIENT)
Facility: HOSPITAL | Age: 70
End: 2025-02-11
Payer: MEDICARE

## 2025-02-11 ENCOUNTER — HOSPITAL ENCOUNTER (EMERGENCY)
Facility: HOSPITAL | Age: 70
Discharge: ANOTHER HOSPITAL | End: 2025-02-11
Attending: EMERGENCY MEDICINE
Payer: MEDICARE

## 2025-02-11 VITALS
WEIGHT: 180 LBS | DIASTOLIC BLOOD PRESSURE: 61 MMHG | HEART RATE: 99 BPM | RESPIRATION RATE: 21 BRPM | SYSTOLIC BLOOD PRESSURE: 106 MMHG | BODY MASS INDEX: 28.93 KG/M2 | OXYGEN SATURATION: 99 % | TEMPERATURE: 97.7 F | HEIGHT: 66 IN

## 2025-02-11 LAB
ALBUMIN SERPL-MCNC: 2.7 G/DL (ref 3.4–5)
ALBUMIN/GLOB SERPL: 0.6 (ref 0.8–1.7)
ALP SERPL-CCNC: 150 U/L (ref 45–117)
ALT SERPL-CCNC: 31 U/L (ref 16–61)
ANION GAP SERPL CALC-SCNC: 9 MMOL/L (ref 3–18)
APPEARANCE UR: CLEAR
AST SERPL-CCNC: 30 U/L (ref 10–38)
BACTERIA URNS QL MICRO: NEGATIVE /HPF
BASOPHILS # BLD: 0.02 K/UL (ref 0–0.1)
BASOPHILS NFR BLD: 0.2 % (ref 0–2)
BILIRUB SERPL-MCNC: 0.9 MG/DL (ref 0.2–1)
BILIRUB UR QL: NEGATIVE
BUN SERPL-MCNC: 26 MG/DL (ref 7–18)
BUN/CREAT SERPL: 18 (ref 12–20)
CALCIUM SERPL-MCNC: 8.8 MG/DL (ref 8.5–10.1)
CHLORIDE SERPL-SCNC: 96 MMOL/L (ref 100–111)
CO2 SERPL-SCNC: 29 MMOL/L (ref 21–32)
COLOR UR: ABNORMAL
CREAT SERPL-MCNC: 1.46 MG/DL (ref 0.6–1.3)
DIFFERENTIAL METHOD BLD: ABNORMAL
EOSINOPHIL # BLD: 0.01 K/UL (ref 0–0.4)
EOSINOPHIL NFR BLD: 0.1 % (ref 0–5)
EPITH CASTS URNS QL MICRO: NEGATIVE /LPF (ref 0–5)
ERYTHROCYTE [DISTWIDTH] IN BLOOD BY AUTOMATED COUNT: 14.1 % (ref 11.6–14.5)
GLOBULIN SER CALC-MCNC: 4.5 G/DL (ref 2–4)
GLUCOSE SERPL-MCNC: 216 MG/DL (ref 74–99)
GLUCOSE UR STRIP.AUTO-MCNC: NEGATIVE MG/DL
HCT VFR BLD AUTO: 30.8 % (ref 36–48)
HGB BLD-MCNC: 9.7 G/DL (ref 13–16)
HGB UR QL STRIP: NEGATIVE
IMM GRANULOCYTES # BLD AUTO: 0.09 K/UL (ref 0–0.04)
IMM GRANULOCYTES NFR BLD AUTO: 1.1 % (ref 0–0.5)
KETONES UR QL STRIP.AUTO: 15 MG/DL
LEUKOCYTE ESTERASE UR QL STRIP.AUTO: NEGATIVE
LIPASE SERPL-CCNC: <6 U/L (ref 13–75)
LYMPHOCYTES # BLD: 0.72 K/UL (ref 0.9–3.6)
LYMPHOCYTES NFR BLD: 8.6 % (ref 21–52)
MCH RBC QN AUTO: 25.8 PG (ref 24–34)
MCHC RBC AUTO-ENTMCNC: 31.5 G/DL (ref 31–37)
MCV RBC AUTO: 81.9 FL (ref 78–100)
MONOCYTES # BLD: 0.65 K/UL (ref 0.05–1.2)
MONOCYTES NFR BLD: 7.7 % (ref 3–10)
NEUTS SEG # BLD: 6.93 K/UL (ref 1.8–8)
NEUTS SEG NFR BLD: 82.3 % (ref 40–73)
NITRITE UR QL STRIP.AUTO: NEGATIVE
NRBC # BLD: 0 K/UL (ref 0–0.01)
NRBC BLD-RTO: 0 PER 100 WBC
PH UR STRIP: 5 (ref 5–8)
PLATELET # BLD AUTO: 197 K/UL (ref 135–420)
PMV BLD AUTO: 10.3 FL (ref 9.2–11.8)
POTASSIUM SERPL-SCNC: 4.7 MMOL/L (ref 3.5–5.5)
PROT SERPL-MCNC: 7.2 G/DL (ref 6.4–8.2)
PROT UR STRIP-MCNC: 100 MG/DL
RBC # BLD AUTO: 3.76 M/UL (ref 4.35–5.65)
RBC #/AREA URNS HPF: NEGATIVE /HPF (ref 0–5)
SODIUM SERPL-SCNC: 134 MMOL/L (ref 136–145)
SP GR UR REFRACTOMETRY: >1.03 (ref 1–1.03)
TROPONIN I SERPL HS-MCNC: <3 NG/L (ref 0–78)
UROBILINOGEN UR QL STRIP.AUTO: 1 EU/DL (ref 0.2–1)
WBC # BLD AUTO: 8.4 K/UL (ref 4.6–13.2)
WBC URNS QL MICRO: NEGATIVE /HPF (ref 0–4)

## 2025-02-11 PROCEDURE — 85025 COMPLETE CBC W/AUTO DIFF WBC: CPT

## 2025-02-11 PROCEDURE — 96365 THER/PROPH/DIAG IV INF INIT: CPT

## 2025-02-11 PROCEDURE — 6360000004 HC RX CONTRAST MEDICATION: Performed by: EMERGENCY MEDICINE

## 2025-02-11 PROCEDURE — 80053 COMPREHEN METABOLIC PANEL: CPT

## 2025-02-11 PROCEDURE — 6370000000 HC RX 637 (ALT 250 FOR IP): Performed by: EMERGENCY MEDICINE

## 2025-02-11 PROCEDURE — 71045 X-RAY EXAM CHEST 1 VIEW: CPT

## 2025-02-11 PROCEDURE — 74177 CT ABD & PELVIS W/CONTRAST: CPT

## 2025-02-11 PROCEDURE — 96375 TX/PRO/DX INJ NEW DRUG ADDON: CPT

## 2025-02-11 PROCEDURE — 93005 ELECTROCARDIOGRAM TRACING: CPT | Performed by: EMERGENCY MEDICINE

## 2025-02-11 PROCEDURE — 2500000003 HC RX 250 WO HCPCS: Performed by: EMERGENCY MEDICINE

## 2025-02-11 PROCEDURE — 84484 ASSAY OF TROPONIN QUANT: CPT

## 2025-02-11 PROCEDURE — 83690 ASSAY OF LIPASE: CPT

## 2025-02-11 PROCEDURE — 81001 URINALYSIS AUTO W/SCOPE: CPT

## 2025-02-11 PROCEDURE — 87086 URINE CULTURE/COLONY COUNT: CPT

## 2025-02-11 PROCEDURE — 99285 EMERGENCY DEPT VISIT HI MDM: CPT

## 2025-02-11 PROCEDURE — 2580000003 HC RX 258: Performed by: EMERGENCY MEDICINE

## 2025-02-11 PROCEDURE — 6360000002 HC RX W HCPCS: Performed by: EMERGENCY MEDICINE

## 2025-02-11 RX ORDER — SODIUM CHLORIDE 9 MG/ML
INJECTION, SOLUTION INTRAVENOUS CONTINUOUS
Status: DISCONTINUED | OUTPATIENT
Start: 2025-02-11 | End: 2025-02-12 | Stop reason: HOSPADM

## 2025-02-11 RX ORDER — ONDANSETRON 2 MG/ML
4 INJECTION INTRAMUSCULAR; INTRAVENOUS
Status: COMPLETED | OUTPATIENT
Start: 2025-02-11 | End: 2025-02-11

## 2025-02-11 RX ORDER — IOPAMIDOL 612 MG/ML
100 INJECTION, SOLUTION INTRAVASCULAR
Status: COMPLETED | OUTPATIENT
Start: 2025-02-11 | End: 2025-02-11

## 2025-02-11 RX ADMIN — ALUMINUM HYDROXIDE AND MAGNESIUM HYDROXIDE 20 ML: 200; 200 SUSPENSION ORAL at 15:46

## 2025-02-11 RX ADMIN — FAMOTIDINE 20 MG: 10 INJECTION, SOLUTION INTRAVENOUS at 15:44

## 2025-02-11 RX ADMIN — PIPERACILLIN AND TAZOBACTAM 4500 MG: 4; .5 INJECTION, POWDER, FOR SOLUTION INTRAVENOUS at 18:50

## 2025-02-11 RX ADMIN — IOPAMIDOL 100 ML: 612 INJECTION, SOLUTION INTRAVENOUS at 16:51

## 2025-02-11 RX ADMIN — ONDANSETRON 4 MG: 2 INJECTION, SOLUTION INTRAMUSCULAR; INTRAVENOUS at 15:46

## 2025-02-11 ASSESSMENT — ENCOUNTER SYMPTOMS
COUGH: 0
VOMITING: 0
NAUSEA: 1
SHORTNESS OF BREATH: 0
ABDOMINAL PAIN: 1

## 2025-02-11 ASSESSMENT — PAIN DESCRIPTION - DESCRIPTORS: DESCRIPTORS: ACHING

## 2025-02-11 ASSESSMENT — PAIN DESCRIPTION - ORIENTATION: ORIENTATION: RIGHT;LEFT;UPPER

## 2025-02-11 ASSESSMENT — PAIN SCALES - GENERAL: PAINLEVEL_OUTOF10: 8

## 2025-02-11 ASSESSMENT — PAIN DESCRIPTION - PAIN TYPE: TYPE: ACUTE PAIN

## 2025-02-11 ASSESSMENT — PAIN DESCRIPTION - LOCATION: LOCATION: ABDOMEN

## 2025-02-11 ASSESSMENT — PAIN - FUNCTIONAL ASSESSMENT: PAIN_FUNCTIONAL_ASSESSMENT: 0-10

## 2025-02-11 NOTE — ED PROVIDER NOTES
EMERGENCY DEPARTMENT HISTORY AND PHYSICAL EXAM    2:40 PM      Date: 2/11/2025  Patient Name: Antwan Venegas    History of Presenting Illness     Chief Complaint   Patient presents with    Abdominal Pain    Nausea       History From: Patient    Antwan Venegas is a 69 y.o. male   70 y/o m w/ pmhx of necrotizing pancreatitis, and cholecystectomy 2/2 galllstone pancreatitis. In 2023 s/p multiple IR drains and ERCP's with stent placement and he was admitted 01/20/25, and underwent laparoscopic cholecystectomy on 01/20/25.  Patient states for the last few days he has been having constant epigastric abdominal discomfort associated with him \"spitting up dark brown bile\".  During the interview patient belches multiple times and rubs his abdomen and spits into a bag.  Patient states that he has been able to have bowel movements.    Denies diarrhea or constipation  Denies urinary changes  Denies actually vomiting  Denies chest pain, shortness of breath           Nursing Notes were all reviewed and agreed with or any disagreements were addressed in the HPI.    PCP: Gifty Dorman, DO    No current facility-administered medications for this encounter.     Current Outpatient Medications   Medication Sig Dispense Refill    lisinopril (PRINIVIL;ZESTRIL) 10 MG tablet TAKE 1 TABLET EVERY DAY 90 tablet 3    BD PEN NEEDLE ARNOLD U/F 32G X 4 MM MISC USE AS DIRECTED 100 each 2    NOVOLOG FLEXPEN 100 UNIT/ML injection pen INJECT 8 UNITS UNDER THE SKIN 3 TIMES DAILY BEFORE BREAKFAST LUNCH AND DINNER. GIVE ADDITIONAL UNITS FOR -249:2 UNITS...250-299:4 UNITS...300-349:6 UNITS...350-399:8 UNITS...>=400:10 UNITS 7.2 mL 1    insulin glargine (LANTUS;BASAGLAR) 100 UNIT/ML injection pen Inject 20 Units into the skin nightly 6 mL 1    Continuous Blood Gluc  (FREESTYLE LUCIO 2 READER) TERRY Blood sugar check daily 1 each 3    sodium chloride flush 0.9 % injection Flush the biliary catheter with 10 ml of normal saline once daily.

## 2025-02-11 NOTE — PROGRESS NOTES
Pharmacy Note     Zosyn 3,375 mg once ordered for treatment of intra-abdominal infection. Per [facility] Renal / Extended Infusion B Lactam Policy, Zosyn will be changed to 4,500 mg once as a loading dose.     Estimated Creatinine Clearance: Estimated Creatinine Clearance: 47 mL/min (A) (based on SCr of 1.46 mg/dL (H)).  Dialysis Status, MISTY, CKD: -    BMI:  Body mass index is 29.05 kg/m².    Recent Labs     25  1537   WBC 8.4     Temp (24hrs), Av °F (36.7 °C), Min:98 °F (36.7 °C), Max:98 °F (36.7 °C)      Rationale for Adjustment:  Loading dose aims to enhance microbiology and clinical efficacy.    Pharmacy will continue to monitor and adjust dose as necessary.      Please call with any questions.    Thank you,  Rosario Faulkner RP

## 2025-02-11 NOTE — ED TRIAGE NOTES
Pt came ambulatory to triage c/o abdominal pain x 1wk.  Pt also reports nausea.    Pt had cholecystectomy on 01/20. Pt states, \"I've coughing up bile for a week now.\"    Hx of GERD.

## 2025-02-12 LAB
BACTERIA SPEC CULT: NORMAL
EKG ATRIAL RATE: 80 BPM
EKG DIAGNOSIS: NORMAL
EKG P AXIS: 22 DEGREES
EKG P-R INTERVAL: 150 MS
EKG Q-T INTERVAL: 384 MS
EKG QRS DURATION: 88 MS
EKG QTC CALCULATION (BAZETT): 442 MS
EKG R AXIS: -24 DEGREES
EKG T AXIS: 54 DEGREES
EKG VENTRICULAR RATE: 80 BPM
SERVICE CMNT-IMP: NORMAL

## 2025-02-12 PROCEDURE — 93010 ELECTROCARDIOGRAM REPORT: CPT | Performed by: INTERNAL MEDICINE

## 2025-02-12 NOTE — ED NOTES
Report called to [facility] [phone] patient assigned to room 138

## 2025-02-12 NOTE — ED NOTES
16 Fr NGT placed to right nare patient tolerated well has drained 600 cc light brownish liquid so far tube maintained to low intermittent suction.

## 2025-02-12 NOTE — ED NOTES
Patient is awake and alert in no acute distress NGT in place. Transport her to take patient to [facility].